# Patient Record
Sex: FEMALE | Race: BLACK OR AFRICAN AMERICAN | NOT HISPANIC OR LATINO | Employment: FULL TIME | URBAN - METROPOLITAN AREA
[De-identification: names, ages, dates, MRNs, and addresses within clinical notes are randomized per-mention and may not be internally consistent; named-entity substitution may affect disease eponyms.]

---
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
## 2020-09-10 ENCOUNTER — HOSPITAL ENCOUNTER (EMERGENCY)
Facility: HOSPITAL | Age: 20
Discharge: OTHER INSTITUTION | End: 2020-09-10
Attending: EMERGENCY MEDICINE | Admitting: EMERGENCY MEDICINE
Payer: COMMERCIAL

## 2020-09-10 ENCOUNTER — HOSPITAL ENCOUNTER (INPATIENT)
Facility: HOSPITAL | Age: 20
LOS: 8 days | Discharge: HOME | DRG: 885 | End: 2020-09-18
Attending: STUDENT IN AN ORGANIZED HEALTH CARE EDUCATION/TRAINING PROGRAM | Admitting: STUDENT IN AN ORGANIZED HEALTH CARE EDUCATION/TRAINING PROGRAM
Payer: COMMERCIAL

## 2020-09-10 VITALS
RESPIRATION RATE: 16 BRPM | HEART RATE: 89 BPM | HEIGHT: 60 IN | OXYGEN SATURATION: 98 % | WEIGHT: 275.57 LBS | SYSTOLIC BLOOD PRESSURE: 110 MMHG | DIASTOLIC BLOOD PRESSURE: 63 MMHG | BODY MASS INDEX: 54.1 KG/M2 | TEMPERATURE: 98.1 F

## 2020-09-10 DIAGNOSIS — D64.9 ANEMIA: ICD-10-CM

## 2020-09-10 DIAGNOSIS — R45.851 SUICIDAL THOUGHTS: Primary | ICD-10-CM

## 2020-09-10 DIAGNOSIS — F32.A DEPRESSION: ICD-10-CM

## 2020-09-10 DIAGNOSIS — K59.00 CONSTIPATION: Primary | ICD-10-CM

## 2020-09-10 DIAGNOSIS — R45.851 SUICIDAL THOUGHTS: ICD-10-CM

## 2020-09-10 LAB
AMPHETAMINES SERPL QL SCN: NEGATIVE
BARBITURATES UR QL: NEGATIVE
BENZODIAZ UR QL: NEGATIVE
COCAINE UR QL: NEGATIVE
ETHANOL EXG-MCNC: 0 MG/DL
EXT PREG TEST URINE: NEGATIVE
EXT. CONTROL ED NAV: NORMAL
METHADONE UR QL: NEGATIVE
OPIATES UR QL SCN: NEGATIVE
OXYCODONE+OXYMORPHONE UR QL SCN: NEGATIVE
PCP UR QL: NEGATIVE
SARS-COV-2 RNA RESP QL NAA+PROBE: NEGATIVE
THC UR QL: NEGATIVE

## 2020-09-10 PROCEDURE — 80307 DRUG TEST PRSMV CHEM ANLYZR: CPT | Performed by: EMERGENCY MEDICINE

## 2020-09-10 PROCEDURE — 99285 EMERGENCY DEPT VISIT HI MDM: CPT

## 2020-09-10 PROCEDURE — 87635 SARS-COV-2 COVID-19 AMP PRB: CPT | Performed by: EMERGENCY MEDICINE

## 2020-09-10 PROCEDURE — 82075 ASSAY OF BREATH ETHANOL: CPT | Performed by: EMERGENCY MEDICINE

## 2020-09-10 PROCEDURE — 81025 URINE PREGNANCY TEST: CPT | Performed by: EMERGENCY MEDICINE

## 2020-09-10 PROCEDURE — G0379 DIRECT REFER HOSPITAL OBSERV: HCPCS

## 2020-09-10 PROCEDURE — 99285 EMERGENCY DEPT VISIT HI MDM: CPT | Performed by: EMERGENCY MEDICINE

## 2020-09-10 RX ORDER — ACETAMINOPHEN 325 MG/1
650 TABLET ORAL EVERY 4 HOURS PRN
Status: CANCELLED | OUTPATIENT
Start: 2020-09-10

## 2020-09-10 RX ORDER — TRAZODONE HYDROCHLORIDE 50 MG/1
50 TABLET ORAL
Status: DISCONTINUED | OUTPATIENT
Start: 2020-09-10 | End: 2020-09-18 | Stop reason: HOSPADM

## 2020-09-10 RX ORDER — MAGNESIUM HYDROXIDE/ALUMINUM HYDROXICE/SIMETHICONE 120; 1200; 1200 MG/30ML; MG/30ML; MG/30ML
30 SUSPENSION ORAL EVERY 4 HOURS PRN
Status: DISCONTINUED | OUTPATIENT
Start: 2020-09-10 | End: 2020-09-18 | Stop reason: HOSPADM

## 2020-09-10 RX ORDER — LORAZEPAM 2 MG/ML
2 INJECTION INTRAMUSCULAR EVERY 6 HOURS PRN
Status: CANCELLED | OUTPATIENT
Start: 2020-09-10

## 2020-09-10 RX ORDER — LORAZEPAM 1 MG/1
1 TABLET ORAL EVERY 6 HOURS PRN
Status: CANCELLED | OUTPATIENT
Start: 2020-09-10

## 2020-09-10 RX ORDER — OLANZAPINE 10 MG/1
10 INJECTION, POWDER, LYOPHILIZED, FOR SOLUTION INTRAMUSCULAR EVERY 8 HOURS PRN
Status: CANCELLED | OUTPATIENT
Start: 2020-09-10

## 2020-09-10 RX ORDER — OLANZAPINE 2.5 MG/1
10 TABLET ORAL EVERY 8 HOURS PRN
Status: CANCELLED | OUTPATIENT
Start: 2020-09-10

## 2020-09-10 RX ORDER — RISPERIDONE 1 MG/1
1 TABLET, ORALLY DISINTEGRATING ORAL
Status: DISCONTINUED | OUTPATIENT
Start: 2020-09-10 | End: 2020-09-18 | Stop reason: HOSPADM

## 2020-09-10 RX ORDER — HALOPERIDOL 5 MG/ML
5 INJECTION INTRAMUSCULAR EVERY 6 HOURS PRN
Status: DISCONTINUED | OUTPATIENT
Start: 2020-09-10 | End: 2020-09-18 | Stop reason: HOSPADM

## 2020-09-10 RX ORDER — RISPERIDONE 1 MG/1
1 TABLET, ORALLY DISINTEGRATING ORAL
Status: CANCELLED | OUTPATIENT
Start: 2020-09-10

## 2020-09-10 RX ORDER — HYDROXYZINE HYDROCHLORIDE 25 MG/1
25 TABLET, FILM COATED ORAL EVERY 6 HOURS PRN
Status: CANCELLED | OUTPATIENT
Start: 2020-09-10

## 2020-09-10 RX ORDER — HALOPERIDOL 5 MG/ML
5 INJECTION INTRAMUSCULAR EVERY 6 HOURS PRN
Status: CANCELLED | OUTPATIENT
Start: 2020-09-10

## 2020-09-10 RX ORDER — ACETAMINOPHEN 325 MG/1
650 TABLET ORAL EVERY 4 HOURS PRN
Status: DISCONTINUED | OUTPATIENT
Start: 2020-09-10 | End: 2020-09-18 | Stop reason: HOSPADM

## 2020-09-10 RX ORDER — HYDROXYZINE HYDROCHLORIDE 25 MG/1
25 TABLET, FILM COATED ORAL EVERY 6 HOURS PRN
Status: DISCONTINUED | OUTPATIENT
Start: 2020-09-10 | End: 2020-09-18 | Stop reason: HOSPADM

## 2020-09-10 RX ORDER — BENZTROPINE MESYLATE 1 MG/1
1 TABLET ORAL EVERY 6 HOURS PRN
Status: DISCONTINUED | OUTPATIENT
Start: 2020-09-10 | End: 2020-09-18 | Stop reason: HOSPADM

## 2020-09-10 RX ORDER — MAGNESIUM HYDROXIDE/ALUMINUM HYDROXICE/SIMETHICONE 120; 1200; 1200 MG/30ML; MG/30ML; MG/30ML
30 SUSPENSION ORAL EVERY 4 HOURS PRN
Status: CANCELLED | OUTPATIENT
Start: 2020-09-10

## 2020-09-10 RX ORDER — TRAZODONE HYDROCHLORIDE 50 MG/1
50 TABLET ORAL
Status: CANCELLED | OUTPATIENT
Start: 2020-09-10

## 2020-09-10 RX ORDER — LORAZEPAM 2 MG/ML
2 INJECTION INTRAMUSCULAR EVERY 6 HOURS PRN
Status: DISCONTINUED | OUTPATIENT
Start: 2020-09-10 | End: 2020-09-18 | Stop reason: HOSPADM

## 2020-09-10 RX ORDER — OLANZAPINE 10 MG/1
10 TABLET ORAL EVERY 8 HOURS PRN
Status: DISCONTINUED | OUTPATIENT
Start: 2020-09-10 | End: 2020-09-18 | Stop reason: HOSPADM

## 2020-09-10 RX ORDER — IBUPROFEN 600 MG/1
600 TABLET ORAL EVERY 6 HOURS PRN
Status: DISCONTINUED | OUTPATIENT
Start: 2020-09-10 | End: 2020-09-18 | Stop reason: HOSPADM

## 2020-09-10 RX ORDER — HALOPERIDOL 5 MG
5 TABLET ORAL EVERY 6 HOURS PRN
Status: DISCONTINUED | OUTPATIENT
Start: 2020-09-10 | End: 2020-09-18 | Stop reason: HOSPADM

## 2020-09-10 RX ORDER — OLANZAPINE 10 MG/1
10 INJECTION, POWDER, LYOPHILIZED, FOR SOLUTION INTRAMUSCULAR EVERY 8 HOURS PRN
Status: DISCONTINUED | OUTPATIENT
Start: 2020-09-10 | End: 2020-09-18 | Stop reason: HOSPADM

## 2020-09-10 RX ORDER — HALOPERIDOL 5 MG
5 TABLET ORAL EVERY 6 HOURS PRN
Status: CANCELLED | OUTPATIENT
Start: 2020-09-10

## 2020-09-10 RX ORDER — IBUPROFEN 600 MG/1
600 TABLET ORAL EVERY 6 HOURS PRN
Status: CANCELLED | OUTPATIENT
Start: 2020-09-10

## 2020-09-10 RX ORDER — ACETAMINOPHEN 325 MG/1
325 TABLET ORAL EVERY 6 HOURS PRN
Status: DISCONTINUED | OUTPATIENT
Start: 2020-09-10 | End: 2020-09-18 | Stop reason: HOSPADM

## 2020-09-10 RX ORDER — BENZTROPINE MESYLATE 1 MG/ML
1 INJECTION INTRAMUSCULAR; INTRAVENOUS EVERY 6 HOURS PRN
Status: CANCELLED | OUTPATIENT
Start: 2020-09-10

## 2020-09-10 RX ORDER — LORAZEPAM 1 MG/1
1 TABLET ORAL EVERY 6 HOURS PRN
Status: DISCONTINUED | OUTPATIENT
Start: 2020-09-10 | End: 2020-09-18 | Stop reason: HOSPADM

## 2020-09-10 RX ORDER — ACETAMINOPHEN 325 MG/1
325 TABLET ORAL EVERY 6 HOURS PRN
Status: CANCELLED | OUTPATIENT
Start: 2020-09-10

## 2020-09-10 RX ORDER — BENZTROPINE MESYLATE 1 MG/1
1 TABLET ORAL EVERY 6 HOURS PRN
Status: CANCELLED | OUTPATIENT
Start: 2020-09-10

## 2020-09-10 RX ORDER — HYDROXYZINE 50 MG/1
50 TABLET, FILM COATED ORAL EVERY 6 HOURS PRN
Status: DISCONTINUED | OUTPATIENT
Start: 2020-09-10 | End: 2020-09-18 | Stop reason: HOSPADM

## 2020-09-10 RX ORDER — HYDROXYZINE HYDROCHLORIDE 25 MG/1
50 TABLET, FILM COATED ORAL EVERY 6 HOURS PRN
Status: CANCELLED | OUTPATIENT
Start: 2020-09-10

## 2020-09-10 RX ORDER — BENZTROPINE MESYLATE 1 MG/ML
1 INJECTION INTRAMUSCULAR; INTRAVENOUS EVERY 6 HOURS PRN
Status: DISCONTINUED | OUTPATIENT
Start: 2020-09-10 | End: 2020-09-18 | Stop reason: HOSPADM

## 2020-09-10 RX ADMIN — HYDROXYZINE HYDROCHLORIDE 50 MG: 50 TABLET, FILM COATED ORAL at 21:59

## 2020-09-10 NOTE — ED NOTES
Insurance Authorization for admission:   Phone call placed to [facility]  Phone number: [phone]  Spoke to [facility] who stated that this is a NEPA plan, and Monie would precert  Transferred to [phone]  Spoke with Gabriele Sparrow who stated that we are to call [phone] to complete precert  Spoke with Piper GERARDO who stated that we are to contact [facility] @ [phone]  Spoke with Chana who referred to [address] [place] [address] @ [phone]  Spoke with Kimberley Rich  who stated that we are to speak with [facility] @ [phone]  Spoke with Mile, who stated that the patients policy termed in 9/26/6145, when using ID# [id number], and there is not another policy in their unit  EVS (Eligibility Verification System) called - [phone]  Automated system indicates: Pt is eligible for MA: mental Health Services are managed by Managed Care  Insurance Authorization for admission:   Phone call placed to [facility]  Phone number: [phone]  Spoke to Hailey Valdez  14 days approved  Level of care: IP  (201)  Review on 9/23  Authorization # [id number]  Insurance Authorization for Transportation:    Not needed for CTS transfer       Ki Penaloza LMSW  09/10/20  1652

## 2020-09-10 NOTE — ED PROVIDER NOTES
Pt Name: Sonia Rivas  MRN: [id number]  [place] 2000  Age/Sex: 23 y o  female  Date of evaluation: 9/10/2020  PCP: Celina Jose MD    [address] [place] [address]    Chief Complaint   Patient presents with    Psychiatric Evaluation     Pt brought in by EMS from home with c/o SI with plan to overdose or shoot herself with father's guns  Says often has SI, this particular episode has been occuring for a week  Was admitted to [facility] last month for similar after shaving her head and cutting her arms  Police on scene this morning called by father juan she lives with  Calm, cooperative, tearful  Seeking inpatient admission         HPI    23 y o  female presenting with anxiety and suicidal ideations  Past medical history of depression and anxiety, has been compliant with her medications  This morning she had racing thoughts, thoughts of harming herself  Texted her therapist, was making suicidal statements  Had a plan to overdose on her medications, states her dad as firearms in the house, she also had a plan to use them on herself  Therapist called crisis, and patient was brought to the emergency department  Denies alcohol, illicit drug use  Denies homicidal ideations  Past Medical and Surgical History    Past Medical History:   Diagnosis Date    Anxiety     Depression     History of psychiatric hospitalization     Panic attack     Psychiatric disorder     Suicidal ideations     Suicide attempt ([facility] [address] ) 2016       History reviewed  No pertinent surgical history  History reviewed  No pertinent family history  Social History     Tobacco Use    Smoking status: Never Smoker    Smokeless tobacco: Never Used   Substance Use Topics    Alcohol use: No    Drug use: No           Allergies    No Known Allergies    Home Medications    Prior to Admission medications    Medication Sig Start Date End Date Taking?  Authorizing Provider   ARIPiprazole (ABILIFY) 10 mg tablet Take 1 tablet by mouth daily At 9AM 12/13/17   Christopher Gokul   sertraline (ZOLOFT) 50 mg tablet Take 1 tablet by mouth daily At [facility] 12/13/17   [address]            Review of Systems    Review of Systems   Constitutional: Negative for chills and fever  HENT: Negative for rhinorrhea and sore throat  Eyes: Negative for pain and visual disturbance  Respiratory: Negative for cough and shortness of breath  Cardiovascular: Negative for chest pain and leg swelling  Gastrointestinal: Negative for abdominal pain, nausea and vomiting  Genitourinary: Negative for dysuria and hematuria  Musculoskeletal: Negative for back pain and myalgias  Skin: Negative for rash and wound  Neurological: Negative for syncope and headaches  Psychiatric/Behavioral: Positive for suicidal ideas  Negative for hallucinations  The patient is nervous/anxious  All other systems reviewed and negative  Physical Exam      ED Triage Vitals [09/10/20 0916]   Temperature Pulse Respirations Blood Pressure SpO2   98 1 °F (36 7 °C) 77 16 106/65 100 %      Temp Source Heart Rate Source Patient Position - Orthostatic VS BP Location FiO2 (%)   Oral Monitor Sitting Right arm --      Pain Score       --               Physical Exam  Constitutional:       General: She is not in acute distress  Appearance: She is not ill-appearing  HENT:      Head: Normocephalic and atraumatic  Nose: Nose normal       Mouth/Throat:      Mouth: Mucous membranes are moist    Eyes:      Extraocular Movements: Extraocular movements intact  Pupils: Pupils are equal, round, and reactive to light  Neck:      Musculoskeletal: Normal range of motion and neck supple  Cardiovascular:      Rate and Rhythm: Normal rate and regular rhythm  Pulmonary:      Effort: No respiratory distress  Breath sounds: Normal breath sounds  No wheezing  Abdominal:      General: Abdomen is flat  There is no distension  Tenderness: There is no abdominal tenderness  Musculoskeletal: Normal range of motion  General: No swelling or deformity  Skin:     General: Skin is warm  Findings: No erythema  Neurological:      Mental Status: She is alert and oriented to person, place, and time  Mental status is at baseline  Psychiatric:      Comments: Anxious, flat affect              Diagnostic Results      Labs:    Results Reviewed     Procedure Component Value Units Date/Time    Rapid drug screen, urine [[id number]] Collected:  09/10/20 0955    Lab Status: In process Specimen:  Urine, Clean Catch Updated:  09/10/20 1040    POCT pregnancy, urine [[id number]]  (Normal) Resulted:  09/10/20 0958    Lab Status:  Final result Updated:  09/10/20 1001     EXT PREG TEST UR (Ref: Negative) negative     Control valid    POCT alcohol breath test [[id number]]  (Normal) Resulted:  09/10/20 0848    Lab Status:  Final result Updated:  09/10/20 0849     EXTBreath Alcohol 0 000    Novel Coronavirus (Covid-19),PCR [facility] [[id number]]     Lab Status:  No result Specimen:  Nares from Nose           All labs reviewed and utilized in the medical decision making process    Radiology:    No orders to display       All radiology studies independently viewed by me and interpreted by the radiologist     Procedure    Procedures        MDM    Vitals reassuring  Suicidal thoughts as discussed above  Had multiple plans to commit suicide this morning  Currently calm and cooperative  Medically cleared for crisis evaluation  ED Course as of Sep 10 1051   Thu Sep 10, 2020   0959 EXTBreath Alcohol: 0 000   1004 PREGNANCY TEST URINE: negative   1045 Patient signed 12 for voluntary inpatient psychiatric treatment             Medications - No data to display        FINAL IMPRESSION    Final diagnoses:   Suicidal thoughts         DISPOSITION    Time reflects when diagnosis was documented in both MDM as applicable and the Disposition within this note     Time User Action Codes Description Comment    9/10/2020 10:46 AM Felix Davila Add [L66 656] Suicidal thoughts       ED Disposition     ED Disposition Condition Date/Time Comment    Transfer to [facility]  Thu Sep 10, 2020 10:50 AM Kevin Wells should be transferred out to crisis and has been medically cleared  MD Documentation      Most Recent Value   Sending MD  Dr Gato Willis:    No follow-up provider specified  DISCHARGE MEDICATIONS:    Patient's Medications   Discharge Prescriptions    No medications on file       No discharge procedures on file  Diana Hunter DO        This note was partially completed using voice recognition technology, and was scanned for gross errors; however some errors may still exist  Please contact the author with any questions or requests for clarification        Diana Hunter DO  09/10/20 9186

## 2020-09-10 NOTE — ED NOTES
Room stripped for behavioral health patient  Environment is safe and appropriate for care at this time        Sonya Joyner  09/10/20 101

## 2020-09-10 NOTE — ED NOTES
Patient is accepted at [facility]  Patient is accepted by Jamie Cardoso PA-C/Dr Sonali Page per [facility] in Intake  Transportation is arranged with CTS per Ed at [facility]  Transportation is scheduled for 630pm        Nurse report is to be called to [phone] prior to patient transfer      Alexis Palacios, DEO  09/10/20  1165

## 2020-09-10 NOTE — ED NOTES
201 sent to Intake for review at [facility]       Aldair Beard, [facility]  09/10/20  1653

## 2020-09-10 NOTE — LETTER
[address]  [address]  [facility] [id number]  Dept: [phone]                                                                EMTALA TRANSFER CONSENT    NAME Viral Baig DOB 2000                              MRN [id number]    I have been informed of my rights regarding examination, treatment, and transfer   by Dr Cheng Hansen DO    Benefits: Other benefits (Include comment)_______________________(Inpatient Psych Tx (201))    Risks: Potential for delay in receiving treatment    Consent for Transfer:  I acknowledge that my medical condition has been evaluated and explained to me by the emergency department physician or other qualified medical person and/or my attending physician, who has recommended that I be transferred to the service of  Accepting Physician: Taz Burns PA-C at [address] [place] [address] Name, [facility]  The above potential benefits of such transfer, the potential risks associated with such transfer, and the probable risks of not being transferred have been explained to me, and I fully understand them  The doctor has explained that, in my case, the benefits of transfer outweigh the risks  I agree to be transferred  I authorize the performance of emergency medical procedures and treatments upon me in both transit and upon arrival at the receiving facility  Additionally, I authorize the release of any and all medical records to the receiving facility and request they be transported with me, if possible  I understand that the safest mode of transportation during a medical emergency is an ambulance and that the Hospital advocates the use of this mode of transport  Risks of traveling to the receiving facility by car, including absence of medical control, life sustaining equipment, such as oxygen, and medical personnel has been explained to me and I fully understand them      ([place] BELOW)  [X]  I consent to the stated transfer and to be transported by ambulance/helicopter  [  ]  I consent to the stated transfer, but refuse transportation by ambulance and accept full responsibility for my transportation by car  I understand the risks of non-ambulance transfers and I exonerate the Hospital and its staff from any deterioration in my condition that results from this refusal     X___________________________________________    DATE  09/10/20  TIME________  Signature of patient or legally responsible individual signing on patient behalf           RELATIONSHIP TO PATIENT_________________________                            Provider Certification    NAME Bijan Schulte                                         2000                              MRN [id number]    A medical screening exam was performed on the above named patient  Based on the examination:    Condition Necessitating Transfer The encounter diagnosis was Suicidal thoughts  Patient Condition: The patient has been stabilized such that within reasonable medical probability, no material deterioration of the patient condition or the condition of the unborn child(neel) is likely to result from the transfer    Reason for Transfer: Level of Care needed not available at this facility    Transfer Requirements: [address] [place]  · Space available and qualified personnel available for treatment as acknowledged by STEPHANIE Merino  · Agreed to accept transfer and to provide appropriate medical treatment as acknowledged by       Jhoan Camarena PA-C  · Appropriate medical records of the examination and treatment of the patient are provided at the time of transfer   [facility], [address] _______  · Transfer will be performed by qualified personnel from [address] [place] [address]  and appropriate transfer equipment as required, including the use of necessary and appropriate life support measures      Provider Certification: I have examined the patient and explained the following risks and benefits of being transferred/refusing transfer to the patient/family:  The patient is stable for psychiatric transfer because they are medically stable, and is protected from harming him/herself or others during transport      Based on these reasonable risks and benefits to the patient and/or the unborn child(neel), and based upon the information available at the time of the patients examination, I certify that the medical benefits reasonably to be expected from the provision of appropriate medical treatments at another medical facility outweigh the increasing risks, if any, to the individuals medical condition, and in the case of labor to the unborn child, from effecting the transfer      X____________________________________________ DATE 09/10/20        TIME_______      ORIGINAL - SEND TO MEDICAL RECORDS   COPY - SEND WITH PATIENT DURING TRANSFER

## 2020-09-10 NOTE — ED NOTES
Patient identifies as transgender and prefers to be called Larry Gordillo, please utilize he/him pronouns        Jose Maria Briones  09/10/20 6976

## 2020-09-10 NOTE — ED NOTES
Patient belongings:     One blue jeep backpack contains:  -side pockets: soap, roll on deodorant, counseling pamphlet   -front pocket: rice krispy treat, deodorant, brush  -middle pocket: two pens one pencil  -main pocket: keys on lanyard, wallet, hat, red vest, stockings, burgundy vest, 3 underwear, leggings, bra, 4 tshirt, sweater vest, sweatpants, shorts, socks, 2 slacks  -wallet contains:3 $1 bills, yellow access card, photo ID, social security card, assorted business cards    Plastic patient belonging bag contains  -flannel top, undershirt, jeans, sneakers, iphone, phone , chest binder    Patient belongings on person include  -glasses, mask     Bernie White  09/10/20 0388

## 2020-09-10 NOTE — ED NOTES
Telephone numbers for patient use     [facility] ([facility]) [phone]  Alyssa Ramirez [facility] [phone]     Khris Reyes  09/10/20 2007

## 2020-09-10 NOTE — ED NOTES
Patient states we can notify her father of change of status (regarding transfers, discharge) but please keep details to a minimum  Patient prefers he not be at bedside at this time        Alexa Simmons  09/10/20 8952
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
## 2020-09-11 PROBLEM — F41.1 GENERALIZED ANXIETY DISORDER WITH PANIC ATTACKS: Status: ACTIVE | Noted: 2020-09-11

## 2020-09-11 PROBLEM — R45.851 SUICIDAL IDEATION: Status: ACTIVE | Noted: 2020-09-11

## 2020-09-11 PROBLEM — D64.9 ANEMIA: Status: ACTIVE | Noted: 2020-09-11

## 2020-09-11 PROBLEM — F41.0 GENERALIZED ANXIETY DISORDER WITH PANIC ATTACKS: Status: ACTIVE | Noted: 2020-09-11

## 2020-09-11 PROBLEM — F32.A DEPRESSION: Status: ACTIVE | Noted: 2017-12-08

## 2020-09-11 PROBLEM — Z00.8 MEDICAL CLEARANCE FOR PSYCHIATRIC ADMISSION: Status: ACTIVE | Noted: 2020-09-11

## 2020-09-11 PROBLEM — F33.9 RECURRENT MAJOR DEPRESSIVE DISORDER (HCC): Status: ACTIVE | Noted: 2020-09-11

## 2020-09-11 PROBLEM — F43.10 PTSD (POST-TRAUMATIC STRESS DISORDER): Status: ACTIVE | Noted: 2020-09-11

## 2020-09-11 LAB
ALBUMIN SERPL BCP-MCNC: 3 G/DL (ref 3.5–5)
ALP SERPL-CCNC: 42 U/L (ref 46–116)
ALT SERPL W P-5'-P-CCNC: 12 U/L (ref 12–78)
ANION GAP SERPL CALCULATED.3IONS-SCNC: 3 MMOL/L (ref 4–13)
AST SERPL W P-5'-P-CCNC: 11 U/L (ref 5–45)
ATRIAL RATE: 87 BPM
BACTERIA UR QL AUTO: ABNORMAL /HPF
BASOPHILS # BLD AUTO: 0.03 THOUSANDS/ΜL (ref 0–0.1)
BASOPHILS # BLD AUTO: 0.03 THOUSANDS/ΜL (ref 0–0.1)
BASOPHILS NFR BLD AUTO: 0 % (ref 0–1)
BASOPHILS NFR BLD AUTO: 1 % (ref 0–1)
BILIRUB SERPL-MCNC: 0.2 MG/DL (ref 0.2–1)
BILIRUB UR QL STRIP: NEGATIVE
BUN SERPL-MCNC: 12 MG/DL (ref 5–25)
CALCIUM SERPL-MCNC: 8.5 MG/DL (ref 8.3–10.1)
CHLORIDE SERPL-SCNC: 106 MMOL/L (ref 100–108)
CHOLEST SERPL-MCNC: 142 MG/DL (ref 50–200)
CLARITY UR: ABNORMAL
CO2 SERPL-SCNC: 29 MMOL/L (ref 21–32)
COLOR UR: YELLOW
CREAT SERPL-MCNC: 0.67 MG/DL (ref 0.6–1.3)
EOSINOPHIL # BLD AUTO: 0.14 THOUSAND/ΜL (ref 0–0.61)
EOSINOPHIL # BLD AUTO: 0.15 THOUSAND/ΜL (ref 0–0.61)
EOSINOPHIL NFR BLD AUTO: 2 % (ref 0–6)
EOSINOPHIL NFR BLD AUTO: 2 % (ref 0–6)
ERYTHROCYTE [DISTWIDTH] IN BLOOD BY AUTOMATED COUNT: 12.3 % (ref 11.6–15.1)
ERYTHROCYTE [DISTWIDTH] IN BLOOD BY AUTOMATED COUNT: 12.5 % (ref 11.6–15.1)
FERRITIN SERPL-MCNC: 57 NG/ML (ref 8–388)
GLUCOSE P FAST SERPL-MCNC: 83 MG/DL (ref 65–99)
GLUCOSE SERPL-MCNC: 83 MG/DL (ref 65–140)
GLUCOSE UR STRIP-MCNC: NEGATIVE MG/DL
HCG SERPL QL: NEGATIVE
HCT VFR BLD AUTO: 32.6 % (ref 36.5–46.1)
HCT VFR BLD AUTO: 33.8 % (ref 36.5–46.1)
HDLC SERPL-MCNC: 51 MG/DL
HGB BLD-MCNC: 10.2 G/DL (ref 12–15.4)
HGB BLD-MCNC: 10.4 G/DL (ref 12–15.4)
HGB UR QL STRIP.AUTO: NEGATIVE
IMM GRANULOCYTES # BLD AUTO: 0.01 THOUSAND/UL (ref 0–0.2)
IMM GRANULOCYTES # BLD AUTO: 0.02 THOUSAND/UL (ref 0–0.2)
IMM GRANULOCYTES NFR BLD AUTO: 0 % (ref 0–2)
IMM GRANULOCYTES NFR BLD AUTO: 0 % (ref 0–2)
IRON SATN MFR SERPL: 19 %
IRON SERPL-MCNC: 49 UG/DL (ref 65–170)
KETONES UR STRIP-MCNC: NEGATIVE MG/DL
LDLC SERPL CALC-MCNC: 84 MG/DL (ref 0–100)
LEUKOCYTE ESTERASE UR QL STRIP: ABNORMAL
LYMPHOCYTES # BLD AUTO: 2.23 THOUSANDS/ΜL (ref 0.6–4.47)
LYMPHOCYTES # BLD AUTO: 2.74 THOUSANDS/ΜL (ref 0.6–4.47)
LYMPHOCYTES NFR BLD AUTO: 35 % (ref 14–44)
LYMPHOCYTES NFR BLD AUTO: 37 % (ref 14–44)
MCH RBC QN AUTO: 27.3 PG (ref 26.8–34.3)
MCH RBC QN AUTO: 27.4 PG (ref 26.8–34.3)
MCHC RBC AUTO-ENTMCNC: 30.8 G/DL (ref 31.4–37.4)
MCHC RBC AUTO-ENTMCNC: 31.3 G/DL (ref 31.4–37.4)
MCV RBC AUTO: 88 FL (ref 82–98)
MCV RBC AUTO: 89 FL (ref 82–98)
MONOCYTES # BLD AUTO: 0.51 THOUSAND/ΜL (ref 0.17–1.22)
MONOCYTES # BLD AUTO: 0.58 THOUSAND/ΜL (ref 0.17–1.22)
MONOCYTES NFR BLD AUTO: 8 % (ref 4–12)
MONOCYTES NFR BLD AUTO: 8 % (ref 4–12)
NEUTROPHILS # BLD AUTO: 3.5 THOUSANDS/ΜL (ref 1.85–7.62)
NEUTROPHILS # BLD AUTO: 3.83 THOUSANDS/ΜL (ref 1.85–7.62)
NEUTS SEG NFR BLD AUTO: 53 % (ref 43–75)
NEUTS SEG NFR BLD AUTO: 54 % (ref 43–75)
NITRITE UR QL STRIP: NEGATIVE
NON-SQ EPI CELLS URNS QL MICRO: ABNORMAL /HPF
NONHDLC SERPL-MCNC: 91 MG/DL
NRBC BLD AUTO-RTO: 0 /100 WBCS
NRBC BLD AUTO-RTO: 0 /100 WBCS
P AXIS: 73 DEGREES
PH UR STRIP.AUTO: 6 [PH]
PLATELET # BLD AUTO: 184 THOUSANDS/UL (ref 149–390)
PLATELET # BLD AUTO: 200 THOUSANDS/UL (ref 149–390)
PMV BLD AUTO: 11.9 FL (ref 8.9–12.7)
PMV BLD AUTO: 12.4 FL (ref 8.9–12.7)
POTASSIUM SERPL-SCNC: 3.8 MMOL/L (ref 3.5–5.3)
PR INTERVAL: 128 MS
PROT SERPL-MCNC: 6.8 G/DL (ref 6.4–8.2)
PROT UR STRIP-MCNC: ABNORMAL MG/DL
QRS AXIS: 60 DEGREES
QRSD INTERVAL: 86 MS
QT INTERVAL: 366 MS
QTC INTERVAL: 440 MS
RBC # BLD AUTO: 3.72 MILLION/UL (ref 3.88–5.12)
RBC # BLD AUTO: 3.81 MILLION/UL (ref 3.88–5.12)
RBC #/AREA URNS AUTO: ABNORMAL /HPF
SODIUM SERPL-SCNC: 138 MMOL/L (ref 136–145)
SP GR UR STRIP.AUTO: >=1.03 (ref 1–1.03)
T WAVE AXIS: 24 DEGREES
TIBC SERPL-MCNC: 253 UG/DL (ref 250–450)
TRIGL SERPL-MCNC: 35 MG/DL
TSH SERPL DL<=0.05 MIU/L-ACNC: 2.3 UIU/ML (ref 0.36–3.74)
UROBILINOGEN UR QL STRIP.AUTO: 0.2 E.U./DL
VENTRICULAR RATE: 87 BPM
WBC # BLD AUTO: 6.43 THOUSAND/UL (ref 4.31–10.16)
WBC # BLD AUTO: 7.34 THOUSAND/UL (ref 4.31–10.16)
WBC #/AREA URNS AUTO: ABNORMAL /HPF

## 2020-09-11 PROCEDURE — 99251 PR INITL INPATIENT CONSULT NEW/ESTAB PT 20 MIN: CPT | Performed by: NURSE PRACTITIONER

## 2020-09-11 PROCEDURE — 86592 SYPHILIS TEST NON-TREP QUAL: CPT | Performed by: PHYSICIAN ASSISTANT

## 2020-09-11 PROCEDURE — 80053 COMPREHEN METABOLIC PANEL: CPT | Performed by: NURSE PRACTITIONER

## 2020-09-11 PROCEDURE — 93010 ELECTROCARDIOGRAM REPORT: CPT | Performed by: INTERNAL MEDICINE

## 2020-09-11 PROCEDURE — 83550 IRON BINDING TEST: CPT | Performed by: NURSE PRACTITIONER

## 2020-09-11 PROCEDURE — 93005 ELECTROCARDIOGRAM TRACING: CPT

## 2020-09-11 PROCEDURE — 80061 LIPID PANEL: CPT | Performed by: NURSE PRACTITIONER

## 2020-09-11 PROCEDURE — 99222 1ST HOSP IP/OBS MODERATE 55: CPT | Performed by: STUDENT IN AN ORGANIZED HEALTH CARE EDUCATION/TRAINING PROGRAM

## 2020-09-11 PROCEDURE — 83540 ASSAY OF IRON: CPT | Performed by: NURSE PRACTITIONER

## 2020-09-11 PROCEDURE — 82728 ASSAY OF FERRITIN: CPT | Performed by: NURSE PRACTITIONER

## 2020-09-11 PROCEDURE — 85025 COMPLETE CBC W/AUTO DIFF WBC: CPT | Performed by: PHYSICIAN ASSISTANT

## 2020-09-11 PROCEDURE — 84443 ASSAY THYROID STIM HORMONE: CPT | Performed by: PHYSICIAN ASSISTANT

## 2020-09-11 PROCEDURE — 81001 URINALYSIS AUTO W/SCOPE: CPT | Performed by: PHYSICIAN ASSISTANT

## 2020-09-11 PROCEDURE — 85025 COMPLETE CBC W/AUTO DIFF WBC: CPT | Performed by: NURSE PRACTITIONER

## 2020-09-11 PROCEDURE — 84703 CHORIONIC GONADOTROPIN ASSAY: CPT | Performed by: NURSE PRACTITIONER

## 2020-09-11 PROCEDURE — G0379 DIRECT REFER HOSPITAL OBSERV: HCPCS

## 2020-09-11 RX ORDER — SERTRALINE HYDROCHLORIDE 100 MG/1
100 TABLET, FILM COATED ORAL DAILY
Status: DISCONTINUED | OUTPATIENT
Start: 2020-09-11 | End: 2020-09-18 | Stop reason: HOSPADM

## 2020-09-11 RX ORDER — ARIPIPRAZOLE 5 MG/1
5 TABLET ORAL DAILY
Status: DISCONTINUED | OUTPATIENT
Start: 2020-09-11 | End: 2020-09-18 | Stop reason: HOSPADM

## 2020-09-11 RX ADMIN — ARIPIPRAZOLE 5 MG: 5 TABLET ORAL at 10:56

## 2020-09-11 RX ADMIN — HYDROXYZINE HYDROCHLORIDE 50 MG: 50 TABLET, FILM COATED ORAL at 23:16

## 2020-09-11 RX ADMIN — SERTRALINE HYDROCHLORIDE 100 MG: 100 TABLET ORAL at 10:56

## 2020-09-11 NOTE — ASSESSMENT & PLAN NOTE
· Patient threatened to shoot herself with her father's gun or overdose on multiple pills  · history of suicidal attempt in past by hanging
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
## 2020-09-11 NOTE — CASE MANAGEMENT
CM worked to complete [facility] referral packet & provided a printout to Pt regarding the program     CM contacted [facility] @ [phone] opt [phone] & left a message to notify of the admission & request a return call to schedule follow-up appointments  CM contacted Pt's PCP office, Dr Purnima Molina, @ [phone] & spoke with Ramiro Rebolledo to notify her of Pt's admission  CM asked if Pt was able to get at PAP done in office & she reported that  Pt was scheduled for a PAP at their [facility] on 8/11, but missed the appointment  CM asked about rescheduling the appointment but was told to call [phone]  CM called & was transferred to the [facility] department & spoke with [facility]  First available appointment is 11/5 at 9:30 AM with TEJ Cano  They asked Pt bring photo ID, insurance card, & any co-pay she may have  CM contacted Pt's therapist, Uriel Morfin, @ [phone] & left a message to notify her of Pt's admission & request a return call

## 2020-09-11 NOTE — CMS CERTIFICATION NOTE
Recertification: Based upon physical, mental and social evaluations, I certify that inpatient psychiatric services continue to be medically necessary for this patient for a duration of 7 midnights for the treatment of  Recurrent major depressive disorder ([facility] [address] )   Available alternative community resources still do not meet the patient's mental health care needs  I further attest that an established written individualized plan of care has been updated and is outlined in the patient's medical records

## 2020-09-11 NOTE — PROGRESS NOTES
Treatment Plan Meeting:  Diagnosis of recurrent major depressive disorder, anemia, generalized anxiety disorder, & post-traumatic stress disorder reviewed  Discussed short term goals for decrease in depression, anxiety, & decrease in suicidal thoughts  Pt was seen by medical today & they ordered additional labs/testing due to concern with anemia  Pt had questions about getting tested for ADHD, reporting that words are "hard" & is unsure if this is due to Asperger's or Mood Disorder  Reviewed that Pt's medications will be titrated & she will be observed for effect  Pt goes to [facility] for medication management & has a therapist through [employer]  She agreed with a referral for [facility](housing/PHP) & for [facility] services; SARI obtained  At this time no discharge date is set  All parties in agreement & treatment plan was signed       09/11/20 1778   Team Meeting   Meeting Type Tx Team Meeting   Initial Conference Date 09/11/20   Next Conference Date 10/08/20   Team Members Present   Team Members Present Physician;Nurse;   Physician Team Member Dr Christi Crespo Team Member ANETA [facility] Management Team Member Gilberto   Patient/Family Present   Patient Present Yes   Patient's Family Present No

## 2020-09-11 NOTE — PROGRESS NOTES
Polite and cooperative during interaction  Attending groups  Comfortable on unit  Denies SI or thoughts of self harm  Elevated depression and anxiety  Reports racing thoughts which at times cause difficulties falling asleep  Compliant with medications, EKG and labwork

## 2020-09-11 NOTE — PROGRESS NOTES
Patient belongings upon arrival;  -(5) Pants  -(7) Shirts  -(3) Underwear  -(2) Lenward Harjit  -(2) Vests  -Hat  -Shoes  -Shorts  -Panty hose  -Sweater  -Cellphone  -  -(2) Deodorants  -Brush  -Ink pens  -Pencil  -Red wooden hearten  -Black Wallet  -Keys  -$3 33  -Pa ID (1918)  -[facility] #([id number])  -High School Id (012)  -Pa Access (0259)  -[facility] Caritas ([id number])  -[facility] Visa (9853)  -$21 [employer] Gift Card  -Prayer card    Pt belongings in washer and at bedside;  -(2) Pants  -(3) Shirts  -(3) Underwear  -(2) Bras  -Deodorant  -Brush

## 2020-09-11 NOTE — TREATMENT PLAN
RN will meet with pt to assess for reason of admission and to educate on medications and diagnosis  Pt will be encouraged to utilize healthy coping skills and attend groups
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
## 2020-09-11 NOTE — TREATMENT PLAN
TREATMENT PLAN REVIEW - [address] 20 y o  2000 adult MRN: [id number]    [address] [place] [address] Room / Bed: Fay Osler 260/[facility] 216-68 Encounter: [id number]          Admit Date/Time:  9/10/2020  7:41 PM    Treatment Team: Attending Provider: Malena Hunter MD; Charge Nurse: Conchita Lovell, RN; Registered Nurse: Alex Calderón, RN; Patient Care Technician: Emelia Simpson; Patient Care Assistant: Maria Elena Chapa; Patient Care Technician: Michael López; Patient Care Technician: Gladys Rodríguez;  Registered Nurse: Jennifer Winn RN; : Florecita Kelly; Registered Nurse: Skylar Hood RN; Nurse Practitioner: CELIA Issa; Care Manager: Dank Hernadez RN; Occupational Therapy Assistant: BAILEE Stevens    Diagnosis: Principal Problem:    Recurrent major depressive disorder [facility])  Active Problems:    Suicidal ideation    Medical clearance for psychiatric admission    Anemia    Generalized anxiety disorder with panic attacks    PTSD (post-traumatic stress disorder)      Patient Strengths/Assets: average or above intelligence, capable of independent living, cooperative, communication skills, compliant with medication, good insight, good past treatment response, good physical health, patient is on a voluntary commitment    Patient Barriers/Limitations: lack of social/family support, limited education, limited support system    Short Term Goals: decrease in depressive symptoms, decrease in anxiety symptoms, decrease in suicidal thoughts    Long Term Goals: improvement in depression, improvement in anxiety, free of suicidal thoughts    Progress Towards Goals: starting psychiatric medications as prescribed    Recommended Treatment: medication management, patient medication education, group therapy, milieu therapy, continued Behavioral Health psychiatric evaluation/assessment process    Treatment Frequency: daily medication monitoring, group and milieu therapy daily, monitoring through interdisciplinary rounds, monitoring through weekly patient care conferences    Expected Discharge Date:  5-7days    Discharge Plan: referral for outpatient medication management with a psychiatrist, referral for outpatient psychotherapy    Treatment Plan Created/Updated By: Samina Rivera MD

## 2020-09-11 NOTE — PLAN OF CARE
New Admission  Problem: Anxiety  Goal: Anxiety is at manageable level  Description: Interventions:  - Assess and monitor patient's anxiety level  - Monitor for signs and symptoms (heart palpitations, chest pain, shortness of breath, headaches, nausea, feeling jumpy, restlessness, irritable, apprehensive)  - Collaborate with interdisciplinary team and initiate plan and interventions as ordered    - [place] patient to unit/surroundings  - Explain treatment plan  - Encourage participation in care  - Encourage verbalization of concerns/fears  - Identify coping mechanisms  - Assist in developing anxiety-reducing skills  - Administer/offer alternative therapies  - Limit or eliminate stimulants  Outcome: Progressing     Problem: SELF HARM/SUICIDALITY  Goal: Will have no self-injury during hospital stay  Description: INTERVENTIONS:  - Q 15 MINUTES: Routine safety checks  - Q WAKING SHIFT & PRN: Assess risk to determine if routine checks are adequate to maintain patient safety  - Encourage patient to participate actively in care by formulating a plan to combat response to suicidal ideation, identify supports and resources  Outcome: Progressing     Problem: DEPRESSION  Goal: Will be euthymic at discharge  Description: INTERVENTIONS:  - Administer medication as ordered  - Provide emotional support via 1:1 interaction with staff  - Encourage involvement in milieu/groups/activities  - Monitor for social isolation  Outcome: Progressing

## 2020-09-11 NOTE — PROGRESS NOTES
Status: Pt is a 11 from [place] who reported SI after an argument with her father  She reported increased depression & had reportedly been at [facility] about a month ago & shaved her head there during an outburst   Pt was at [facility] in 2017  She received PRN medication & slept    Readmit score 18(yellow)  Medication: to be reviewed / PRN - Atarax  D/C: TBD / new admission     09/11/20 8704   Team Meeting   Meeting Type Daily Rounds   Team Members Present   Team Members Present Physician;Nurse;;Occupational Therapist   Physician Team Member Dr Priyanka Herrera / Carlitos Barry Team Member ANETA [facility] Management Team Member [id number] / Deuce Osorio   OT Team Member Phani   Patient/Family Present   Patient Present No   Patient's Family Present No

## 2020-09-11 NOTE — CONSULTS
[facility]   Consult- Sonia Rivas 2000, 23 y o  adult MRN: [id number]    Unit/Bed#: [facility] 260-02 Encounter: [id number]    Primary Care Provider: Celina Jose MD   Date and time admitted to hospital: 9/10/2020  7:41 PM      Consults    * Depression  Assessment & Plan  · Patient is a transgender female to male who presents to the ED female who presented to the ED due to increased depression, anxiety, and suicidal ideation with a plan to either shoot herself with her father's gun or overdose on pills  Patient reports that these thoughts have increased since day prior, after her dad got in an argument  · Patient prefers to be called Rogerio Sheth  · Patient has a history of multiple psychiatric admissions at [facility] and [facility]  Follows up outpatient with crisis residential services and has a counselor in [place] and psychiatrist at [facility]  · Psychiatry following     Suicidal ideation  Assessment & Plan  · Patient threatened to shoot herself with her father's gun or overdose on multiple pills  · history of suicidal attempt in past by hanging  Anemia  Assessment & Plan  · Review reveals hgb 11 2 back in 2017  Currently 10 2   · Added Iron Panel on to blood work today     Medical clearance for psychiatric admission  Assessment & Plan  · EKG NSR with qtc 440   · Labs ordered  · Cleared for inpatient behavioral health admission         VTE Prophylaxis: Reason for no pharmacologic prophylaxis ambulatory   / reason for no mechanical VTE prophylaxis ambulatory low risk      Recommendations for Discharge:  · Pending clearance by primary team     Counseling / Coordination of Care Time: 45 minutes  Greater than 50% of total time spent on patient counseling and coordination of care  Collaboration of Care:  Were Recommendations Directly Discussed with Primary Treatment Team? - No     History of Present Illness:    Sonia Rivas is a 23 y o  adult who is originally admitted to the psychiatric service due to depression and suicidal ideations  We are consulted for medical management  Review of Systems:    Review of Systems   Constitutional: Negative  HENT: Negative  Eyes: Negative  Respiratory: Negative  Cardiovascular: Negative  Gastrointestinal: Negative  Endocrine: Negative  Genitourinary: Negative  Musculoskeletal: Negative  Skin: Negative  Allergic/Immunologic: Negative  Neurological: Negative  Hematological: Negative  Psychiatric/Behavioral: Positive for dysphoric mood and suicidal ideas  The patient is nervous/anxious  Past Medical and Surgical History:     Past Medical History:   Diagnosis Date    Anxiety     Depression     History of psychiatric hospitalization     Panic attack     Psychiatric disorder     Suicidal ideations     Suicide attempt ([facility] [address] ) 2016       No past surgical history on file  Meds/Allergies:    PTA meds:   Prior to Admission Medications   Prescriptions Last Dose Informant Patient Reported? Taking? ARIPiprazole (ABILIFY) 10 mg tablet  Self No No   Sig: Take 1 tablet by mouth daily At 9AM   Patient taking differently: Take 10 mg by mouth daily Rx bottle states 5mg po Daily   sertraline (ZOLOFT) 50 mg tablet   No No   Sig: Take 1 tablet by mouth daily At 9AM      Facility-Administered Medications: None       Allergies: No Known Allergies    Social History:     Marital Status: Single    Substance Use History:   Social History     Substance and Sexual Activity   Alcohol Use No    Frequency: Never    Binge frequency: Never     Social History     Tobacco Use   Smoking Status Never Smoker   Smokeless Tobacco Never Used     Social History     Substance and Sexual Activity   Drug Use No       Family History:    History reviewed  No pertinent family history      Physical Exam:     Vitals:   Blood Pressure: 125/64 (09/11/20 1044)  Pulse: 99 (09/11/20 1044)  Temperature: 97 9 °F (36 6 °C) (09/11/20 1044)  Temp Source: Tympanic (09/11/20 1044)  Respirations: 16 (09/11/20 1044)  Height: 5' 5" (165 1 cm) (09/11/20 1012)  Weight - Scale: 94 4 kg (208 lb 3 2 oz) (09/11/20 1012)  SpO2: 98 % (09/10/20 2014)    Physical Exam  Constitutional:       Appearance: He is obese  HENT:      Head: Normocephalic and atraumatic  Eyes:      Extraocular Movements: Extraocular movements intact  Pupils: Pupils are equal, round, and reactive to light  Neck:      Musculoskeletal: Normal range of motion  Cardiovascular:      Rate and Rhythm: Normal rate and regular rhythm  Heart sounds: No murmur  Pulmonary:      Effort: Pulmonary effort is normal  No respiratory distress  Breath sounds: Normal breath sounds  Abdominal:      General: Bowel sounds are normal       Palpations: Abdomen is soft  Musculoskeletal: Normal range of motion  Right lower leg: No edema  Left lower leg: No edema  Skin:     General: Skin is warm and dry  Neurological:      General: No focal deficit present  Mental Status: He is oriented to person, place, and time  Psychiatric:         Mood and Affect: Mood normal          Behavior: Behavior normal          Thought Content: Thought content normal          Judgment: Judgment normal              Additional Data:     Lab Results: I have personally reviewed pertinent reports        Results from last 7 days   Lab Units 09/11/20  0811   WBC Thousand/uL 6 43   HEMOGLOBIN g/dL 10 2*   HEMATOCRIT % 32 6*   PLATELETS Thousands/uL 184   NEUTROS PCT % 54   LYMPHS PCT % 35   MONOS PCT % 8   EOS PCT % 2     Results from last 7 days   Lab Units 09/11/20  0811   SODIUM mmol/L 138   POTASSIUM mmol/L 3 8   CHLORIDE mmol/L 106   CO2 mmol/L 29   BUN mg/dL 12   CREATININE mg/dL 0 67   ANION GAP mmol/L 3*   CALCIUM mg/dL 8 5   ALBUMIN g/dL 3 0*   TOTAL BILIRUBIN mg/dL 0 20   ALK PHOS U/L 42*   ALT U/L 12   AST U/L 11   GLUCOSE RANDOM mg/dL 83             No results found for: HGBA1C            Imaging: I have personally reviewed pertinent reports  No orders to display       EKG, Pathology, and Other Studies Reviewed on Admission:   · EKG: pending    ** Please Note: This note has been constructed using a voice recognition system   **

## 2020-09-11 NOTE — DISCHARGE INSTR - APPOINTMENTS
Tramaine Melendez RN, our [employer], will be calling you after your discharge, on the phone number that you provided  She will be available as an additional support, if needed  If you wish to speak with her, you may contact Curt Baird at [phone]

## 2020-09-11 NOTE — DISCHARGE INSTR - OTHER ORDERS
Crisis Intervention   [facility] [phone] provides 24 hour, 7 day a week crisis phone service for any individual in mental health crisis in [facility]  The telephone service is a hotline, manned by a trained professional  Individuals can receive support, information and referral services 24 hours a day 7 days a week  Text CONNECT to 860358 from anywhere in the [place], anytime, about any type of crisis  A live, trained Crisis Counselor receives the text and lets you know that they are here to listen  The volunteer Crisis Counselor will help you move from a hot moment to a cool moment  The [address] [employer] is a toll free telephone line that can be accessed by consumers who are looking for someone to talk to for support or guidance  It is a support service that is designed to promote recovery while focusing on strengths and providing guidance  This line is run by a provider, but is staffed with consumers and/or certified peer specialists who receive a great deal of training and supervision  MH/DS funds this program  The phone number is [phone]  The hours of operation are from 6 PM to 10 PM daily  NAME SPECIALITY INSURANCES/PAYMENTS ADDRESS/PHONE   Depression/Bipolar Support Group 1st & 3rd Wednesdays of each Month  7:00 pm  9:00 pm [facility] [address], [place]  [address]     What you need to know aboutcoronavirus disease 2019 (COVID-19)     What is coronavirus disease 2019 (COVID-19)? Coronavirus disease 2019 (COVID-19) is a respiratory illness that can spread from person to person  The virus that causes COVID-19 is a novel coronavirus that was first identified during an investigation into an outbreak in [place], [place]  Can people in the [place]  get COVID-19? Yes  COVID-19 is spreading from person to person in parts of the [place]   Risk of infection with COVID-19 is higher for people who are close contacts of someone known to have COVID-19, for example healthcare workers, or household members  Other people at higher risk for infection are those who live in or have recently been in an area with ongoing spread of COVID-19  Learn more about places with ongoing spread at   [facility]  html#geographic  Have there been cases of COVID-19 in the [place] ?   Yes  The first case of COVID-19 in the [place] was reported on January 21, 2020  The current count of cases of COVID-19 in the [place] is available on [employer] at [facility]  How does COVID-19 spread? The virus that causes COVID-19 probably emerged from an animal source, but is now spreading from person to person  The virus is thought to spread mainly between people who are in close contact with one another (within about 6 feet) through respiratory droplets produced when an infected person coughs or sneezes  It also may be possible that a person can get COVID-19 by touching a surface or object that has the virus on it and then touching their own mouth, nose, or possibly their eyes, but this is not thought to be the main way the virus spreads  Learn what is known about the spread of newly emerged coronaviruses at [facility]  What are the symptoms of COVID-19? Patients with COVID-19 have had mild to severe respiratory illness with symptoms of   fever   cough   shortness of breath  What are severe complications from this virus? Some patients have pneumonia in both lungs, multi-organ failure and in some cases death  How can I help protect myself? People can help protect themselves from respiratory illness with everyday preventive actions  Avoid close contact with people who are sick  Avoid touching your eyes, nose, and mouth withunwashed hands      Wash your hands often with soap and water for at least 20 seconds  Use an alcohol-based hand  that contains at least 60% alcohol if soap and water are not available  If you are sick, to keep from spreading respiratory illness to others, you should   Stay home when you are sick  Cover your cough or sneeze with a tissue, then throw the tissue in the trash  Clean and disinfect frequently touched objectsand surfaces  What should I do if I recently traveled from an area with ongoing spread of COVID-19? If you have traveled from an affected area, there may be restrictions on your movements for up to 2 weeks  If you develop symptoms during that period (fever, cough, trouble breathing), seek medical advice  Call the office of your health care provider before you go, and tell them about your travel and your symptoms  They will give you instructions on how to get care without exposing other people to your illness  While sick, avoid contact with people, don't go out and delay any travel to reduce the possibility of spreading illness to others  Is there a vaccine? There is currently no vaccine to protect against COVID-19  The best way to prevent infection is to take everyday preventive actions, like avoiding close contact with people who are sick and washing your hands often  Is there a treatment? There is no specific antiviral treatment for COVID-19  People with COVID-19 can seek medical care to helprelieve symptoms  For more information: www cdc gov/GBUIN63ZT 262197-Z 03/03/2020       What to do if you are sick withcoronavirus disease 2019 (COVID-19)     If you are sick with COVID-19 or suspect you are infected with the virus that causes COVID-19, follow the steps below to help prevent the disease from spreading to people in your home and community  Stay home except to get medical care   You should restrict activities outside your home, except for getting medical care  Do not go to work, school, or public areas   Avoid using public transportation, ride-sharing, or taxis  Separate yourself from other people and animals inyour home  People: As much as possible, you should stay in a specific room and away from other people in your home  Also, you should use a separate bathroom, if available  Animals: Do not handle pets or other animals while sick  See COVID-19 and Animals for more information  Call ahead before visiting your doctor   If you have a medical appointment, call the healthcare provider and tell them that you have or may have COVID-19  This will help the healthcare provider's office take steps to keep other people from getting infected or exposed  Wear a facemask  You should wear a facemask when you are around other people (e g , sharing a room or vehicle) or pets and before you enter a healthcare provider's office  If you are not able to wear a facemask (for example, because it causes trouble breathing), then people who live with you should not stay in the same room with you, or they should wear a facemask if they enteryour room  Cover your coughs and sneezes   Cover your mouth and nose with a tissue when you cough or sneeze  Throw used tissues in a lined trash can; immediately wash your hands with soap and water for at least 20 seconds or clean your hands with an alcohol-based hand  that contains at least 60 to 95% alcohol, covering all surfaces of your hands and rubbing them together until they feel dry  Soap and water should be used preferentially if hands are visibly dirty  Avoid sharing personal household items   You should not share dishes, drinking glasses, cups, eating utensils, towels, or bedding with other people or pets in your home  After using these items, they should be washed thoroughly with soap and water  Clean your hands often  Wash your hands often with soap and water for at least 20 seconds   If soap and water are not available, clean your hands with an alcohol-based hand  that contains at least 60% alcohol, covering all surfaces of your hands and rubbing them together until they feel dry  Soap and water should be used preferentially if hands are visibly dirty  Avoid touching your eyes, nose, and mouth with unwashed hands  Clean all "high-touch" surfaces every day  High touch surfaces include counters, tabletops, doorknobs, bathroom fixtures, toilets, phones, keyboards, tablets, and bedside tables  Also, clean any surfaces that may have blood, stool, or body fluids on them  Use a household cleaning spray or wipe, according to the label instructions  Labels contain instructions for safe and effective use of the cleaning product including precautions you should take when applying the product, such as wearing gloves and making sure you have good ventilation during use of the product  Monitor your symptoms  Seek prompt medical attention if your illness is worsening (e g , difficulty breathing)  Before seeking care, call your healthcare provider and tell them that you have, or are being evaluated for, COVID-19  Put on a facemask before you enter the facility  These steps will help the healthcare provider's office to keep other people in the office or waiting room from getting infectedor exposed  Ask your healthcare provider to call the local or [facility] health department  Persons who are placed under active monitoring or facilitated self-monitoring should follow instructions provided by their local health department or occupational health professionals, as appropriate  If you have a medical emergency and need to call 911, notify the dispatch personnel that you have, or are being evaluated for COVID-19  If possible, put on a facemask before emergency medical services arrive  Discontinuing home isolation  Patients with confirmed COVID-19 should remain under home isolation precautions until the risk of secondary transmission to others is thought to be low   The decision to discontinue home isolation precautions should be made on a case-by-case basis, in consultation with healthcare providers and state and local health departments  For more information: www cdc gov/VHALM35KZ 800530-A 02/24/2020       Stay home when you are sick,except to get medical care  Wash your hands often with soap and water for at least 20 seconds  Cover your cough or sneeze with a tissue, then throw the tissue in the trash  Clean and disinfect frequently touched objects and surfaces  Avoid touching your eyes, nose, and mouth  STOP THE SPREAD OF GERMS  For more information: www cdc gov/COVID19 Avoid close contact with people who are sick  Help prevent the spread of respiratory diseases like COVID-19

## 2020-09-11 NOTE — PROGRESS NOTES
23 yr old transgender female to male adm to [facility]  Increased depression with fleeting SI after an argument with her father, last adm to this unit 12/17 , is pleasant and cooperative, contracts for safety , denies voices

## 2020-09-11 NOTE — ASSESSMENT & PLAN NOTE
· Patient is a transgender female to male who presents to the ED female who presented to the ED due to increased depression, anxiety, and suicidal ideation with a plan to either shoot herself with her father's gun or overdose on pills  Patient reports that these thoughts have increased since day prior, after her dad got in an argument  · Patient prefers to be called Santy Herrera  · Patient has a history of multiple psychiatric admissions at [facility] and [place] [id number]  Follows up outpatient with crisis residential services and has a counselor in [place] and psychiatrist at [facility]     · Psychiatry following

## 2020-09-12 PROCEDURE — G0379 DIRECT REFER HOSPITAL OBSERV: HCPCS

## 2020-09-12 PROCEDURE — 99232 SBSQ HOSP IP/OBS MODERATE 35: CPT | Performed by: PSYCHIATRY & NEUROLOGY

## 2020-09-12 RX ADMIN — ARIPIPRAZOLE 5 MG: 5 TABLET ORAL at 09:33

## 2020-09-12 RX ADMIN — HYDROXYZINE HYDROCHLORIDE 50 MG: 50 TABLET, FILM COATED ORAL at 22:07

## 2020-09-12 RX ADMIN — SERTRALINE HYDROCHLORIDE 100 MG: 100 TABLET ORAL at 09:33

## 2020-09-12 NOTE — TREATMENT TEAM
AM rounds- denies SI, social and attending groups  Elevated anxiety in the evening  Practicing deep breathing  Woke overnight anxious  Able to return to sleep with prn medications

## 2020-09-12 NOTE — PLAN OF CARE
Problem: Anxiety  Goal: Anxiety is at manageable level  Description: Interventions:  - Assess and monitor patient's anxiety level  - Monitor for signs and symptoms (heart palpitations, chest pain, shortness of breath, headaches, nausea, feeling jumpy, restlessness, irritable, apprehensive)  - Collaborate with interdisciplinary team and initiate plan and interventions as ordered    - [place] patient to unit/surroundings  - Explain treatment plan  - Encourage participation in care  - Encourage verbalization of concerns/fears  - Identify coping mechanisms  - Assist in developing anxiety-reducing skills  - Administer/offer alternative therapies  - Limit or eliminate stimulants  Outcome: Progressing     Problem: SELF HARM/SUICIDALITY  Goal: Will have no self-injury during hospital stay  Description: INTERVENTIONS:  - Q 15 MINUTES: Routine safety checks  - Q WAKING SHIFT & PRN: Assess risk to determine if routine checks are adequate to maintain patient safety  - Encourage patient to participate actively in care by formulating a plan to combat response to suicidal ideation, identify supports and resources  Outcome: Progressing     Problem: DEPRESSION  Goal: Will be euthymic at discharge  Description: INTERVENTIONS:  - Administer medication as ordered  - Provide emotional support via 1:1 interaction with staff  - Encourage involvement in milieu/groups/activities  - Monitor for social isolation  Outcome: Progressing

## 2020-09-12 NOTE — PROGRESS NOTES
Progress Note - Behavioral Health     Catina Peres 23 y o  adult MRN: @MRN   Unit/Bed#: [facility] 260-02 Encounter: [id number]    Per nursing report and sign out, patient goes by Oscar Simons (Transgender to male)  Came in after argument with father  Denies SI, social with peers  Was upset last night  Anxious overnight, broken sleep  Catina Peres reports today that so far med is ok presently  Felt panic feelings, but meds help with that  Depression 5/10, anxiety 4/109  No SI or HI  No AVH  'mostly ists just anxiety for the future"       Energy: a bit tired, 'but I am awake'  Sleep: broken sleep  Appetite: normal  Medication side effects: No   ROS: all other systems are negative    Mental Status Evaluation:    Appearance:  age appropriate   Behavior:  pleasant, cooperative, with good eye contact   Speech:  Normal volume, regular rate and rhythm   Mood:  dysphoric, anxious   Affect:  mood congruent, constricted       Thought Process:  goal directed, circumstantial, overregulated but a bit disorganized   Associations: intact associations   Thought Content:  negative thinking and cognitive distortions, no overt delusions   Perceptual Disturbances: no auditory or visual hallcunations   Risk Potential: Suicidal ideation - None  Homicidal ideation - None  Potential for aggression - No   Sensorium:  A&Ox3   Cognition:  grossly intact   Consciousness:  Alert & Awake   Memory:  recent and remote memory grossly intact   Attention: attention span and concentration are age appropriate           Insight:  limited   Judgment: limited   Muscle Strength  Muscle Tone normal  normal   Gait/Station: normal gait/station with good balance   Motor Activity: no abnormal movements       Vital signs in last 24 hours:    Temp:  [96 9 °F (36 1 °C)-98 2 °F (36 8 °C)] 96 9 °F (36 1 °C)  HR:  [73-99] 73  Resp:  [8-20] 8  BP: (111-138)/(57-74) 111/57    Laboratory results:  I have personally reviewed all pertinent laboratory/tests results  Assessment/Plan   Principal Problem:    Depression  Active Problems:    Suicidal ideation    Medical clearance for psychiatric admission    Anemia    Recurrent major depressive disorder (HCC)    Generalized anxiety disorder with panic attacks    PTSD (post-traumatic stress disorder)      Kavitha Lawson is making some progress, rough night last night    Recommended Treatment:     Planned medication and treatment changes: All current active medications have been reviewed    Encourage group therapy, milieu therapy and occupational therapy  [facility] checks as unit standard unless ordered or noted otherwise    Current Facility-Administered Medications   Medication Dose Route Frequency Provider Last Rate    acetaminophen  325 mg Oral Q6H PRN Jayme Pall, PA-C      acetaminophen  650 mg Oral Q4H PRN Jayme Pall, PA-C      aluminum-magnesium hydroxide-simethicone  30 mL Oral Q4H PRN Jayme Pall, PA-C      ARIPiprazole  5 mg Oral Daily Robbin Cox MD      benztropine  1 mg Intramuscular Q6H PRN Jayme Pall, PA-C      benztropine  1 mg Oral Q6H PRN Jayme Pall, PA-C      haloperidol  5 mg Oral Q6H PRN Jayme Pall, PA-C      haloperidol lactate  5 mg Intramuscular Q6H PRN Jayme Pall, PA-C      hydrOXYzine HCL  25 mg Oral Q6H PRN Jayme Pall, PA-C      hydrOXYzine HCL  50 mg Oral Q6H PRN Jayme Pall, PA-C      ibuprofen  600 mg Oral Q6H PRN Jayme Pall, PA-C      LORazepam  2 mg Intramuscular Q6H PRN Jayme Pall, PA-C      LORazepam  1 mg Oral Q6H PRN Jayme Pall, PA-C      magnesium hydroxide  30 mL Oral Daily PRN Jayme Pall, PA-C      OLANZapine  10 mg Intramuscular Q8H PRN Jayme Pall, PA-C      OLANZapine  10 mg Oral Q8H PRN Jayme Pall, PA-C      risperiDONE  1 mg Oral Q3H PRN Jayme Pall, PA-C      sertraline  100 mg Oral Daily Robbin Cox MD  traZODone  50 mg Oral HS PRN Zainab Magana PA-C         1) continue current treatment  2) Continue to support patient and engage them in the programs available as feasible and appropriate  Continue case management support and therapy  Continue discharge planning  Risks / Benefits of Treatment:    Risks, benefits, and possible side effects of medications explained to patient and patient verbalizes understanding and agreement for treatment  Counseling / Coordination of Care:    Medication changes reviewed with nursing staff  Medications, treatment progress and treatment plan reviewed with patient        Phyllis Dubois III, DO  9/12/2020  7:57 AM

## 2020-09-12 NOTE — PROGRESS NOTES
Pt is noted to be social with select peers in the dayroom  Pt is pleasant in conversation with good eye contact  Pt denies SI/HI/AVH and reports she is doing well since "I was raised in isolation"  Pt continued to explained that she grew up much without his family and without having any friends  Pt denied having any questions or concerns at the present time
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
## 2020-09-12 NOTE — PROGRESS NOTES
Pt received atarax 50 mg for a panic attack  Pt was able to reduce stress level and remain in control  Pt returned to his room and was observed sleeping within the hour and remains asleep at this time  Will continue to monitor throughout the night and will note any changes
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
## 2020-09-12 NOTE — PROGRESS NOTES
Pt went to bed late attempting to hang near the nurse's desk  Redirected pt to bed  Pt pleasant and cooperative  Pt slept the rest of the night

## 2020-09-12 NOTE — PROGRESS NOTES
Pt has been visible on unit throughout day, attending groups  Reports improving anxiety, "no panic attacks today " Denies SI, HI, AVH  Calm, cooperative  Upon ending conversation, pt states, "One more thing  What is the national origin or horses?" Pt has been asking multiple staff to print items, such as research articles

## 2020-09-13 PROCEDURE — 99232 SBSQ HOSP IP/OBS MODERATE 35: CPT | Performed by: PSYCHIATRY & NEUROLOGY

## 2020-09-13 PROCEDURE — G0379 DIRECT REFER HOSPITAL OBSERV: HCPCS

## 2020-09-13 RX ADMIN — SERTRALINE HYDROCHLORIDE 100 MG: 100 TABLET ORAL at 09:27

## 2020-09-13 RX ADMIN — ARIPIPRAZOLE 5 MG: 5 TABLET ORAL at 09:27

## 2020-09-13 NOTE — CONSULTS
Consult- Rohan Osborne 2000, 23 y o  adult MRN: [id number]    Unit/Bed#: [facility] 171-00 Encounter: [id number]    Primary Care Provider: Maicol Honeycutt MD   Date and time admitted to hospital: 9/10/2020  7:41 PM      Inpatient consult for Medical Clearance for [facility] patient  Consult performed by: Teresita Milan PA-C  Consult ordered by: Izzy Hayes PA-C        Please refer to full consult note dated 9/11/20 for further details

## 2020-09-13 NOTE — TREATMENT TEAM
AM rounds- pleasant and social  Fleeting SI -verbally contracts for safety  Evenings increased anxiety and required prn medications  Slept well

## 2020-09-13 NOTE — PROGRESS NOTES
Remains calm and cooperative  Visible and social with peers, attends groups  Denies SI/HI/AVH  Denies questions/concerns

## 2020-09-13 NOTE — PROGRESS NOTES
Progress Note - Behavioral Health     Nirumelissa Nuñez 23 y o  adult MRN: @MRN   Unit/Bed#: [facility] 260-02 Encounter: [id number]    Per nursing report and sign out, patient had fleeting SI, pleasant, social  Struggles more in evening  Ativan PRN effective, no panic attack and did sleep  Niru Nuñez reports today that notse "I have been less harried" but feels environent is better here  Depression 2/10, anxiety 3/10  No SI, no AVH, no HI  Energy: good  Sleep: normal  Appetite: normal  Medication side effects: No   ROS: all other systems are negative    Mental Status Evaluation:    Appearance:  age appropriate   Behavior:  pleasant, cooperative, with good eye contact   Speech:  Normal volume, regular rate and rhythm   Mood:  depressed and anxious   Affect:  brighter with some improvement       Thought Process:  Linear and goal directed, circumferential   Associations: intact associations   Thought Content:  negative thinking and cognitive distortions   Perceptual Disturbances: no auditory or visual hallcunations   Risk Potential: Suicidal ideation - None  Homicidal ideation - None  Potential for aggression - No   Sensorium:  A&Ox3   Cognition:  grossly intact   Consciousness:  Alert & Awake   Memory:  recent and remote memory grossly intact   Attention: attention span and concentration are age appropriate           Insight:  improving   Judgment: improving   Muscle Strength  Muscle Tone normal  normal   Gait/Station: normal gait/station with good balance   Motor Activity: no abnormal movements       Vital signs in last 24 hours:    Temp:  [96 9 °F (36 1 °C)-97 9 °F (36 6 °C)] 97 9 °F (36 6 °C)  HR:  [67-85] 67  Resp:  [16] 16  BP: ()/(50-76) 83/50    Laboratory results:  I have personally reviewed all pertinent laboratory/tests results      Assessment/Plan   Principal Problem:    Depression  Active Problems:    Suicidal ideation    Medical clearance for psychiatric admission    Anemia    Recurrent major depressive disorder ([address] )    Generalized anxiety disorder with panic attacks    PTSD (post-traumatic stress disorder)      Yvonne Hannaine is making some progress    Recommended Treatment:     Planned medication and treatment changes: All current active medications have been reviewed  Encourage group therapy, milieu therapy and occupational therapy  [facility] checks as unit standard unless ordered or noted otherwise    Current Facility-Administered Medications   Medication Dose Route Frequency Provider Last Rate    acetaminophen  325 mg Oral Q6H PRN [facility], PA-C      acetaminophen  650 mg Oral Q4H PRN [facility], PA-C      aluminum-magnesium hydroxide-simethicone  30 mL Oral Q4H PRN [facility], PA-C      ARIPiprazole  5 mg Oral Daily Ángela Jordan MD      benztropine  1 mg Intramuscular Q6H PRN [facility], PA-C      benztropine  1 mg Oral Q6H PRN [facility], PA-C      haloperidol  5 mg Oral Q6H PRN [facility], PA-C      haloperidol lactate  5 mg Intramuscular Q6H PRN [facility], PA-C      hydrOXYzine HCL  25 mg Oral Q6H PRN [facility], PA-C      hydrOXYzine HCL  50 mg Oral Q6H PRN [facility], PA-C      ibuprofen  600 mg Oral Q6H PRN [facility], PA-C      LORazepam  2 mg Intramuscular Q6H PRN [facility], PA-C      LORazepam  1 mg Oral Q6H PRN [facility], PA-C      magnesium hydroxide  30 mL Oral Daily PRN [facility], PA-C      OLANZapine  10 mg Intramuscular Q8H PRN [facility], PA-C      OLANZapine  10 mg Oral Q8H PRN [facility], PA-C      risperiDONE  1 mg Oral Q3H PRN [facility], PA-C      sertraline  100 mg Oral Daily Ángela Jordan MD      traZODone  50 mg Oral HS PRN [facility], PA-C         1) continue current treatment  2) Continue to support patient and engage them in the programs available as feasible and appropriate  Continue case management support and therapy  Continue discharge planning  Risks / Benefits of Treatment:    Risks, benefits, and possible side effects of medications explained to patient and patient verbalizes understanding and agreement for treatment  Counseling / Coordination of Care:    Medication changes reviewed with nursing staff  Medications, treatment progress and treatment plan reviewed with patient        Argentina Herr III, DO  9/13/2020  8:21 AM

## 2020-09-13 NOTE — PROGRESS NOTES
Pt requesting medication for anxiety  Reports having panic attacks in the late evenings and now having fear of having one  Received prn atarax 50mg at 2207  Reports helpful  Pt able to fall asleep and currently remains asleep  Will continue to monitor throughout the night and will note any change in status

## 2020-09-13 NOTE — PLAN OF CARE
Problem: Anxiety  Goal: Anxiety is at manageable level  Description: Interventions:  - Assess and monitor patient's anxiety level  - Monitor for signs and symptoms (heart palpitations, chest pain, shortness of breath, headaches, nausea, feeling jumpy, restlessness, irritable, apprehensive)  - Collaborate with interdisciplinary team and initiate plan and interventions as ordered    - [place] patient to unit/surroundings  - Explain treatment plan  - Encourage participation in care  - Encourage verbalization of concerns/fears  - Identify coping mechanisms  - Assist in developing anxiety-reducing skills  - Administer/offer alternative therapies  - Limit or eliminate stimulants  Outcome: Progressing     Problem: SELF HARM/SUICIDALITY  Goal: Will have no self-injury during hospital stay  Description: INTERVENTIONS:  - Q 15 MINUTES: Routine safety checks  - Q WAKING SHIFT & PRN: Assess risk to determine if routine checks are adequate to maintain patient safety  - Encourage patient to participate actively in care by formulating a plan to combat response to suicidal ideation, identify supports and resources  Outcome: Progressing     Problem: DEPRESSION  Goal: Will be euthymic at discharge  Description: INTERVENTIONS:  - Administer medication as ordered  - Provide emotional support via 1:1 interaction with staff  - Encourage involvement in milieu/groups/activities  - Monitor for social isolation  Outcome: Progressing

## 2020-09-14 LAB — RPR SER QL: NORMAL

## 2020-09-14 PROCEDURE — 99232 SBSQ HOSP IP/OBS MODERATE 35: CPT | Performed by: STUDENT IN AN ORGANIZED HEALTH CARE EDUCATION/TRAINING PROGRAM

## 2020-09-14 PROCEDURE — G0379 DIRECT REFER HOSPITAL OBSERV: HCPCS

## 2020-09-14 RX ADMIN — SERTRALINE HYDROCHLORIDE 100 MG: 100 TABLET ORAL at 09:24

## 2020-09-14 RX ADMIN — ARIPIPRAZOLE 5 MG: 5 TABLET ORAL at 09:24

## 2020-09-14 NOTE — PLAN OF CARE
Pt spontaneously engaged in group discussion and reported self awareness that she does get irritable about [place] things  Pt  was progressively more expressive as the day went on    Problem: Ineffective Coping  Goal: Participates in unit activities  Description: Interventions:  - Provide therapeutic environment   - Provide required programming   - Redirect inappropriate behaviors   Outcome: Progressing

## 2020-09-14 NOTE — PROGRESS NOTES
09/14/20 1000 09/14/20 7830   Activity/Group Checklist   Group [employer] meeting Life Skills  ("crab task" on self awareness of triggers and relaxation )   Attendance Attended Attended   Attendance Duration (min) 31-45 46-60   Interactions Interacted appropriately  (when called upon only ) Interacted appropriately   Affect/Mood Constricted Appropriate;Bright;Normal range  (spontaneous in expressing her triggers )   Goals Achieved Able to listen to others; Identified feelings; Discussed coping strategies  (pt  quiet in session ) Able to engage in interactions; Discussed coping strategies; Identified feelings; Identified triggers

## 2020-09-14 NOTE — PROGRESS NOTES
Pt in day room watching a movie with peers  Calm, polite, cooperative during interaction  Bright affect  Denies SI/HI/AVH  Reports feeling overwhelmed in a good way  Received news from  that there is a couple that will be a support for a place to live upon discharge  States feeling good on medications having no concerns or questions at this time  Attends groups, social with peers, sleeping well

## 2020-09-14 NOTE — PROGRESS NOTES
Status: Pt has been calm & cooperative  She expresses anxiety in the evening & says she worries about having a panic attack  Medication: no changes / no PRNs  D/C: end of week(?) / Pt has a therapist & psychiatrist   CM is working on [employer] referral, however, Pt is stating she won't go back to father's home         09/14/20 0800   Team Meeting   Meeting Type Daily Rounds   Team Members Present   Team Members Present Physician;Nurse;;Occupational Therapist   Physician Team Member Dr Mateo Negrete / Mahesh Betancourt Team Member Carlos Pink / Jayson Meadows Management Team Member Lobo Aviles / Sirena Croft   OT Team Member Alhaji Stone   Patient/Family Present   Patient Present No   Patient's Family Present No

## 2020-09-14 NOTE — PROGRESS NOTES
Progress Note - Behavioral Health   Rohan Osborne 23 y o  adult MRN: [id number]  Unit/Bed#: [facility] 260-02 Encounter: [id number]    Assessment/Plan   Principal Problem:    Depression  Active Problems:    Suicidal ideation    Medical clearance for psychiatric admission    Anemia    Recurrent major depressive disorder (HCC)    Generalized anxiety disorder with panic attacks    PTSD (post-traumatic stress disorder)    Patient was seen, chart reviewed and case discussed with that team  As per report patient did not have any behavioral issues over the weekend  Patient says that her mood was mainly due to situational with her being at father's house  She feels much better in the hospital and does not want to return to his father's house as it was aggravating mood  Sleep and appetite are improved  Anxiety under control  Denies any thoughts to hurt self  Behavior over the last 24 hours:  improved  Sleep: normal  Appetite: normal  Medication side effects: No  ROS: no complaints    Mental Status Evaluation:  Appearance:  age appropriate and overweight   Behavior:  normal and Cooperative   Speech:  normal pitch and normal volume   Mood:  euthymic   Affect:  constricted   Thought Process:  goal directed and logical   Thought Content:  normal   Perceptual Disturbances: None   Risk Potential: Suicidal Ideations none  Homicidal Ideations none  Potential for Aggression No   Sensorium:  person, place, time/date, situation, day of week, month of year and year   Memory:  recent and remote memory grossly intact   Consciousness:  alert and awake    Attention: attention span and concentration were age appropriate   Insight:  fair   Judgment: fair   Gait/Station: normal gait/station   Motor Activity: no abnormal movements     Progress Toward Goals:  Progressing    Recommended Treatment: Continue with pharmacotherapy, group therapy, milieu therapy and occupational therapy        Risks, benefits and possible side effects of Medications: Risks, benefits, and possible side effects of medications explained to patient and patient verbalizes understanding  Risks of medications in pregnancy explained if female patient  Patient verbalizes understanding and agrees to notify her doctor if she becomes pregnant      Medications:   all current active meds have been reviewed, continue current psychiatric medications and current meds:   Current Facility-Administered Medications   Medication Dose Route Frequency    acetaminophen (TYLENOL) tablet 325 mg  325 mg Oral Q6H PRN    acetaminophen (TYLENOL) tablet 650 mg  650 mg Oral Q4H PRN    aluminum-magnesium hydroxide-simethicone (MYLANTA) 200-200-20 mg/5 mL oral suspension 30 mL  30 mL Oral Q4H PRN    ARIPiprazole (ABILIFY) tablet 5 mg  5 mg Oral Daily    benztropine (COGENTIN) injection 1 mg  1 mg Intramuscular Q6H PRN    benztropine (COGENTIN) tablet 1 mg  1 mg Oral Q6H PRN    haloperidol (HALDOL) tablet 5 mg  5 mg Oral Q6H PRN    haloperidol lactate (HALDOL) injection 5 mg  5 mg Intramuscular Q6H PRN    hydrOXYzine HCL (ATARAX) tablet 25 mg  25 mg Oral Q6H PRN    hydrOXYzine HCL (ATARAX) tablet 50 mg  50 mg Oral Q6H PRN    ibuprofen (MOTRIN) tablet 600 mg  600 mg Oral Q6H PRN    LORazepam (ATIVAN) injection 2 mg  2 mg Intramuscular Q6H PRN    LORazepam (ATIVAN) tablet 1 mg  1 mg Oral Q6H PRN    magnesium hydroxide (MILK OF MAGNESIA) 400 mg/5 mL oral suspension 30 mL  30 mL Oral Daily PRN    OLANZapine (ZyPREXA) IM injection 10 mg  10 mg Intramuscular Q8H PRN    OLANZapine (ZyPREXA) tablet 10 mg  10 mg Oral Q8H PRN    risperiDONE (RisperDAL M-TABS) dispersible tablet 1 mg  1 mg Oral Q3H PRN    sertraline (ZOLOFT) tablet 100 mg  100 mg Oral Daily    traZODone (DESYREL) tablet 50 mg  50 mg Oral HS PRN

## 2020-09-14 NOTE — PROGRESS NOTES
Polite during interaction  Attending groups and social with peers  Denies SI/HI or hallucinations at this time  Patient spoke with his rabbi and does not plan on returning to his fathers, planning to stay with a couple from [place] where he was referred by   Patient rely's on rabbi in times of crisis  Compliant with medications, denies side effects, concerned about how they may interact with testosterone which is the plans once the cost and whether insurance is decided

## 2020-09-14 NOTE — CASE MANAGEMENT
AKIN received a return call from Pt's therapist, Janki Tao, who reported that she initially had seen Pt & his father for some session, but then when [place] happened & they went to only [facility], she only saw Pt & didn't continue with his father  Thao said that Pt had texted her making suicidal ideation/threats & she called crisis, who then call 911  Thao said that she had been begging Pt & her father to call & self referral for [facility] or Alaina Griffith or [facility] for an ICM  Thao said that she had given them the information at least 3 times, & they never called  CM reviewed that in 2017, during Pt's previous admission, she had referred Pt for ICM services, but she never followed-up  Thao said that Pt frequently does express some type of ideation, so she had increased her contact to two sessions/week for the past month or so  Pt is currently scheduled for telehealth sessions on Mon at 4 PM & Weds & 2 PM   AKIN confirmed fax number [phone] to send discharge information

## 2020-09-15 PROCEDURE — G0379 DIRECT REFER HOSPITAL OBSERV: HCPCS

## 2020-09-15 PROCEDURE — 99233 SBSQ HOSP IP/OBS HIGH 50: CPT | Performed by: PHYSICIAN ASSISTANT

## 2020-09-15 RX ORDER — FERROUS SULFATE 325(65) MG
325 TABLET ORAL 2 TIMES DAILY WITH MEALS
Status: DISCONTINUED | OUTPATIENT
Start: 2020-09-15 | End: 2020-09-18 | Stop reason: HOSPADM

## 2020-09-15 RX ORDER — DOCUSATE SODIUM 100 MG/1
100 CAPSULE, LIQUID FILLED ORAL 2 TIMES DAILY
Status: DISCONTINUED | OUTPATIENT
Start: 2020-09-15 | End: 2020-09-18 | Stop reason: HOSPADM

## 2020-09-15 RX ADMIN — DOCUSATE SODIUM 100 MG: 100 CAPSULE, LIQUID FILLED ORAL at 12:02

## 2020-09-15 RX ADMIN — ARIPIPRAZOLE 5 MG: 5 TABLET ORAL at 09:11

## 2020-09-15 RX ADMIN — SERTRALINE HYDROCHLORIDE 100 MG: 100 TABLET ORAL at 09:11

## 2020-09-15 RX ADMIN — DOCUSATE SODIUM 100 MG: 100 CAPSULE, LIQUID FILLED ORAL at 17:12

## 2020-09-15 RX ADMIN — FERROUS SULFATE TAB 325 MG (65 MG ELEMENTAL FE) 325 MG: 325 (65 FE) TAB at 16:10

## 2020-09-15 RX ADMIN — FERROUS SULFATE TAB 325 MG (65 MG ELEMENTAL FE) 325 MG: 325 (65 FE) TAB at 12:02

## 2020-09-15 NOTE — PROGRESS NOTES
Status: Pt was pleasant & cooperative, social   Pt reported that she spoke with her [employer], who identified a couple she can stay with  Pt was reportedly going to call her father last night to tell him this      Medication: no changes / no PRN   D/C: end of week(?) / CM submitted [facility] referral yesterday & will follow-up today regarding timeline     09/15/20 0800   Team Meeting   Meeting Type Daily Rounds   Team Members Present   Team Members Present Physician;Nurse;;Occupational Therapist   Physician Team Member Dr Judith Copeland / Xi Whitehead Team Member Nolan Ruano / Sandra Shen Management Team Member Eunice Alarcon / Elizabeth Moreno   OT Team Member Phani   Patient/Family Present   Patient Present No   Patient's Family Present No

## 2020-09-15 NOTE — PROGRESS NOTES
Polite during interaction  Compliant with medications and meals  Denies SI/HI, AH/VH  Denies depression  Denies anxiety initially then stated uncertain if he had a panic attack overnight while sleeping in the depths of his sleep  Attempted to contact dad, no answer and did not leave a message  Continues to plan to stay with a couple the  suggested to stay with  Content on the unit do to the socialization, at home he is lonely and has no one to speak with

## 2020-09-15 NOTE — PLAN OF CARE
Problem: Ineffective Coping  Goal: Identifies ineffective coping skills  Outcome: Progressing  Goal: Demonstrates healthy coping skills  Outcome: Progressing  Goal: Participates in unit activities  Description: Interventions:  - Provide therapeutic environment   - Provide required programming   - Redirect inappropriate behaviors   Outcome: Progressing     Problem: Anxiety  Goal: Anxiety is at manageable level  Description: Interventions:  - Assess and monitor patient's anxiety level  - Monitor for signs and symptoms (heart palpitations, chest pain, shortness of breath, headaches, nausea, feeling jumpy, restlessness, irritable, apprehensive)  - Collaborate with interdisciplinary team and initiate plan and interventions as ordered    - [place] patient to unit/surroundings  - Explain treatment plan  - Encourage participation in care  - Encourage verbalization of concerns/fears  - Identify coping mechanisms  - Assist in developing anxiety-reducing skills  - Administer/offer alternative therapies  - Limit or eliminate stimulants  Outcome: Progressing     Problem: SELF HARM/SUICIDALITY  Goal: Will have no self-injury during hospital stay  Description: INTERVENTIONS:  - Q 15 MINUTES: Routine safety checks  - Q WAKING SHIFT & PRN: Assess risk to determine if routine checks are adequate to maintain patient safety  - Encourage patient to participate actively in care by formulating a plan to combat response to suicidal ideation, identify supports and resources  Outcome: Progressing     Problem: DEPRESSION  Goal: Will be euthymic at discharge  Description: INTERVENTIONS:  - Administer medication as ordered  - Provide emotional support via 1:1 interaction with staff  - Encourage involvement in milieu/groups/activities  - Monitor for social isolation  Outcome: Progressing     Problem: DISCHARGE PLANNING  Goal: Discharge to home or other facility with appropriate resources  Description: INTERVENTIONS:  - Identify barriers to discharge w/patient and caregiver  - Arrange for needed discharge resources and transportation as appropriate  - Identify discharge learning needs (meds, wound care, etc )  - Arrange for interpretive services to assist at discharge as needed  - Refer to Case Management Department for coordinating discharge planning if the patient needs post-hospital services based on physician/advanced practitioner order or complex needs related to functional status, cognitive ability, or social support system  Outcome: Progressing

## 2020-09-15 NOTE — CASE MANAGEMENT
CM attempted to contact [facility] program @ [phone], but continued to reach a busy signal   CM contacted the [facility] office @ [phone] & left a message seeking a returned call      CM received a voicemail from [facility], stating that Pt is scheduled with Dr Rosangela Cespedes on 9/29 at 2:30 PM

## 2020-09-15 NOTE — PROGRESS NOTES
Patient has been visible in the milieu and social with peers  Pleasant on approach  Denies suicidal and homicidal ideations, denies pain, denies depression  Mild anxiety noted  Cooperative with medications  Denies questions or concerns at this time

## 2020-09-15 NOTE — PROGRESS NOTES
Progress Note - Behavioral Health   Rodrick Bey 23 y o  adult MRN: [id number]  Unit/Bed#: [facility] 260-02 Encounter: [id number]    Assessment/Plan   Principal Problem:    Depression  Active Problems:    Suicidal ideation    Medical clearance for psychiatric admission    Anemia    Recurrent major depressive disorder (HCC)    Generalized anxiety disorder with panic attacks    PTSD (post-traumatic stress disorder)      Subjective:  Patient seen out attending groups and social with her peers  States depression and anxiety are slowly decreasing  If unable to go to newly found housing states that she feels like living with her father is like a death wish  Has diminished passive death wishes and contracts for safety  Appears to have brighter affect  Reports energy is low  Sleeping and eating well  Appears anxiety is decreased  Denied psychosis  Denied delusional material   No signs of josafat or agitation  Denied PTSD related symptoms  Medication compliant  Appears to be tolerating medications well without serious side effects  Somatic complaint of constipation        Current Medications:  Current Facility-Administered Medications   Medication Dose Route Frequency    acetaminophen (TYLENOL) tablet 325 mg  325 mg Oral Q6H PRN    acetaminophen (TYLENOL) tablet 650 mg  650 mg Oral Q4H PRN    aluminum-magnesium hydroxide-simethicone (MYLANTA) 200-200-20 mg/5 mL oral suspension 30 mL  30 mL Oral Q4H PRN    ARIPiprazole (ABILIFY) tablet 5 mg  5 mg Oral Daily    benztropine (COGENTIN) injection 1 mg  1 mg Intramuscular Q6H PRN    benztropine (COGENTIN) tablet 1 mg  1 mg Oral Q6H PRN    docusate sodium (COLACE) capsule 100 mg  100 mg Oral BID    ferrous sulfate tablet 325 mg  325 mg Oral BID With Meals    haloperidol (HALDOL) tablet 5 mg  5 mg Oral Q6H PRN    haloperidol lactate (HALDOL) injection 5 mg  5 mg Intramuscular Q6H PRN    hydrOXYzine HCL (ATARAX) tablet 25 mg  25 mg Oral Q6H PRN    hydrOXYzine HCL (ATARAX) tablet 50 mg  50 mg Oral Q6H PRN    ibuprofen (MOTRIN) tablet 600 mg  600 mg Oral Q6H PRN    LORazepam (ATIVAN) injection 2 mg  2 mg Intramuscular Q6H PRN    LORazepam (ATIVAN) tablet 1 mg  1 mg Oral Q6H PRN    magnesium hydroxide (MILK OF MAGNESIA) 400 mg/5 mL oral suspension 30 mL  30 mL Oral Daily PRN    OLANZapine (ZyPREXA) IM injection 10 mg  10 mg Intramuscular Q8H PRN    OLANZapine (ZyPREXA) tablet 10 mg  10 mg Oral Q8H PRN    risperiDONE (RisperDAL M-TABS) dispersible tablet 1 mg  1 mg Oral Q3H PRN    sertraline (ZOLOFT) tablet 100 mg  100 mg Oral Daily    traZODone (DESYREL) tablet 50 mg  50 mg Oral HS PRN       Behavioral Health Medications: all current active meds have been reviewed and continue current psychiatric medications  Vitals:  Vitals:    09/15/20 0725   BP: 97/54   Pulse: 73   Resp: 18   Temp: (!) 97 °F (36 1 °C)   SpO2:        Laboratory results:    I have personally reviewed all pertinent laboratory/tests results    Most Recent Labs:   Lab Results   Component Value Date    WBC 6 43 09/11/2020    RBC 3 72 (L) 09/11/2020    HGB 10 2 (L) 09/11/2020    HCT 32 6 (L) 09/11/2020     09/11/2020    RDW 12 3 09/11/2020    NEUTROABS 3 50 09/11/2020    SODIUM 138 09/11/2020    K 3 8 09/11/2020     09/11/2020    CO2 29 09/11/2020    BUN 12 09/11/2020    CREATININE 0 67 09/11/2020    GLUC 83 09/11/2020    GLUF 83 09/11/2020    CALCIUM 8 5 09/11/2020    AST 11 09/11/2020    ALT 12 09/11/2020    ALKPHOS 42 (L) 09/11/2020    TP 6 8 09/11/2020    ALB 3 0 (L) 09/11/2020    TBILI 0 20 09/11/2020    CHOLESTEROL 142 09/11/2020    HDL 51 09/11/2020    TRIG 35 09/11/2020    LDLCALC 84 09/11/2020    [place] 91 09/11/2020    KNZ1CCAANYFX 2 298 09/11/2020    PREGSERUM Negative 09/11/2020    RPR Non-Reactive 09/11/2020       Psychiatric Review of Systems:  Behavior over the last 24 hours:  improved  Sleep: normal  Appetite: normal  Medication side effects: No  ROS: no complaints, all others negative    Mental Status Evaluation:  Appearance:  casually dressed   Behavior:  less guarded   Speech:  normal pitch and normal volume   Mood:  less depressed and anxious   Affect:  constricted   Language appropriate   Thought Process:  Goal directed and linear   Thought Content:  normal   Perceptual Disturbances: None   Risk Potential: Diminished passive death wishes  Denied HI  Potential for aggression: no   Sensorium:  person, place and time/date   Cognition:  recent and remote memory grossly intact   Consciousness:  alert and awake    Attention: attention span and concentration were age appropriate   Insight:  partial   Judgment: partial   Gait/Station: normal gait/station and normal balance   Motor Activity: no abnormal movements     Progress Toward Goals: progressing    Recommended Treatment: Continue with group therapy, milieu therapy and occupational therapy  1   Add Ferrous Sulfate 325mg BID for iron deficiency anemia  2  Add Colace 100mg BID for constipation  3  Continue other medications  4  Disposition planning     Risks, benefits and possible side effects of Medications:   Risks, benefits, and possible side effects of medications explained to patient and patient verbalizes understanding        Perlita Murphy PA-C

## 2020-09-15 NOTE — PLAN OF CARE
Problem: Ineffective Coping  Goal: Identifies ineffective coping skills  Outcome: Progressing  Goal: Demonstrates healthy coping skills  Outcome: Progressing  Goal: Participates in unit activities  Description: Interventions:  - Provide therapeutic environment   - Provide required programming   - Redirect inappropriate behaviors   Outcome: Progressing     Problem: Anxiety  Goal: Anxiety is at manageable level  Description: Interventions:  - Assess and monitor patient's anxiety level  - Monitor for signs and symptoms (heart palpitations, chest pain, shortness of breath, headaches, nausea, feeling jumpy, restlessness, irritable, apprehensive)  - Collaborate with interdisciplinary team and initiate plan and interventions as ordered    - [place] patient to unit/surroundings  - Explain treatment plan  - Encourage participation in care  - Encourage verbalization of concerns/fears  - Identify coping mechanisms  - Assist in developing anxiety-reducing skills  - Administer/offer alternative therapies  - Limit or eliminate stimulants  Outcome: Progressing     Problem: SELF HARM/SUICIDALITY  Goal: Will have no self-injury during hospital stay  Description: INTERVENTIONS:  - Q 15 MINUTES: Routine safety checks  - Q WAKING SHIFT & PRN: Assess risk to determine if routine checks are adequate to maintain patient safety  - Encourage patient to participate actively in care by formulating a plan to combat response to suicidal ideation, identify supports and resources  Outcome: Progressing     Problem: DEPRESSION  Goal: Will be euthymic at discharge  Description: INTERVENTIONS:  - Administer medication as ordered  - Provide emotional support via 1:1 interaction with staff  - Encourage involvement in milieu/groups/activities  - Monitor for social isolation  Outcome: Progressing     Problem: DISCHARGE PLANNING  Goal: Discharge to home or other facility with appropriate resources  Description: INTERVENTIONS:  - Identify barriers to discharge w/patient and caregiver  - Arrange for needed discharge resources and transportation as appropriate  - Identify discharge learning needs (meds, wound care, etc )  - Arrange for interpretive services to assist at discharge as needed  - Refer to Case Management Department for coordinating discharge planning if the patient needs post-hospital services based on physician/advanced practitioner order or complex needs related to functional status, cognitive ability, or social support system  Outcome: Progressing

## 2020-09-16 PROCEDURE — 99232 SBSQ HOSP IP/OBS MODERATE 35: CPT | Performed by: STUDENT IN AN ORGANIZED HEALTH CARE EDUCATION/TRAINING PROGRAM

## 2020-09-16 PROCEDURE — G0379 DIRECT REFER HOSPITAL OBSERV: HCPCS

## 2020-09-16 RX ADMIN — FERROUS SULFATE TAB 325 MG (65 MG ELEMENTAL FE) 325 MG: 325 (65 FE) TAB at 17:03

## 2020-09-16 RX ADMIN — ARIPIPRAZOLE 5 MG: 5 TABLET ORAL at 09:20

## 2020-09-16 RX ADMIN — DOCUSATE SODIUM 100 MG: 100 CAPSULE, LIQUID FILLED ORAL at 17:03

## 2020-09-16 RX ADMIN — DOCUSATE SODIUM 100 MG: 100 CAPSULE, LIQUID FILLED ORAL at 09:21

## 2020-09-16 RX ADMIN — SERTRALINE HYDROCHLORIDE 100 MG: 100 TABLET ORAL at 09:20

## 2020-09-16 RX ADMIN — FERROUS SULFATE TAB 325 MG (65 MG ELEMENTAL FE) 325 MG: 325 (65 FE) TAB at 09:20

## 2020-09-16 NOTE — CASE MANAGEMENT
CM met with Pt who reported she had received an edible arrangement from her [employer] yesterday, which was very nice  CM asked if Pt had made contact with her father yet, & she said no  She said that she didn't try to call him yesterday, but would do so today  Pt said that she does plan to go to her home to get belongings first, & then her rabbi will assist with arranging transportation to the home in [place], where she will be staying  Pt said that she feels such relieve that she won't be returning to her father's house & said that she feels she can more forward & do more now  Pt had no questions regarding tentative d/c plans for Friday  CM contacted [facility] @ [phone] opt 4 & left a 2nd message for Radha Ray of [address] [facility] psych rehab & independent living program, regarding the referral CM faxed on 9/14 & requesting a return call  CM contacted [facility] [phone] & spoke with Ross Gaspar, who reported she didn't have a direct number for [facility], however, could provide the general number for [facility]: [phone]

## 2020-09-16 NOTE — PROGRESS NOTES
Status: Pt denied any SI/HI  She is pleasant & cooperative  She slept overnight  No issues/concerns to report  Medication: Colace & Ferrous Sulfate started / no PRN   D/C: Friday / referral made to [facility] called & left a message & is just waiting for a returned call  Pt has made plans to stay with a couple through her rabbi    She has follow-up appointments with her psychiatrist & therapist      09/16/20 0800   Team Meeting   Meeting Type Daily Rounds   Team Members Present   Team Members Present Physician;Nurse;;Occupational Therapist   Physician Team Member Dr Beatriz Clark / Kamla [place] Team Member Katya Cardoso / Mabel Page Management Team Member Julian Whittington / Marine Long   OT Team Member Phani   Patient/Family Present   Patient Present No   Patient's Family Present No

## 2020-09-16 NOTE — PLAN OF CARE
Problem: Ineffective Coping  Goal: Identifies ineffective coping skills  Outcome: Progressing  Goal: Demonstrates healthy coping skills  Outcome: Progressing     Problem: Anxiety  Goal: Anxiety is at manageable level  Description: Interventions:  - Assess and monitor patient's anxiety level  - Monitor for signs and symptoms (heart palpitations, chest pain, shortness of breath, headaches, nausea, feeling jumpy, restlessness, irritable, apprehensive)  - Collaborate with interdisciplinary team and initiate plan and interventions as ordered    - [place] patient to unit/surroundings  - Explain treatment plan  - Encourage participation in care  - Encourage verbalization of concerns/fears  - Identify coping mechanisms  - Assist in developing anxiety-reducing skills  - Administer/offer alternative therapies  - Limit or eliminate stimulants  Outcome: Progressing     Problem: SELF HARM/SUICIDALITY  Goal: Will have no self-injury during hospital stay  Description: INTERVENTIONS:  - Q 15 MINUTES: Routine safety checks  - Q WAKING SHIFT & PRN: Assess risk to determine if routine checks are adequate to maintain patient safety  - Encourage patient to participate actively in care by formulating a plan to combat response to suicidal ideation, identify supports and resources  Outcome: Progressing     Problem: DEPRESSION  Goal: Will be euthymic at discharge  Description: INTERVENTIONS:  - Administer medication as ordered  - Provide emotional support via 1:1 interaction with staff  - Encourage involvement in milieu/groups/activities  - Monitor for social isolation  Outcome: Progressing

## 2020-09-16 NOTE — PROGRESS NOTES
Pt reports having a restful night sleep  Pt reported feeling "better" from admission  Pt reported that she is not returning home to her father's on discharge  Pt reported she is going to a home that helps people in difficult situations  Pt reported medications are helping her feel better and has no questions or concerns about treatment

## 2020-09-16 NOTE — PROGRESS NOTES
Pt visible on unit attending groups and sitting in the day room  Pt reports talking with the doctor today and being told possible discharge Friday and is excited about discharge and feels ready  Pt reports not be able to get in touch with her father to inform him that she isn't returning home but will continue try to arrange a time to get her belongings

## 2020-09-16 NOTE — PROGRESS NOTES
Progress Note - Behavioral Health   Nakia Bur 23 y o  adult MRN: [id number]  Unit/Bed#: [facility] 260-02 Encounter: [id number]    Assessment/Plan   Principal Problem:    Depression  Active Problems:    Suicidal ideation    Medical clearance for psychiatric admission    Anemia    Recurrent major depressive disorder ([address] )    Generalized anxiety disorder with panic attacks    PTSD (post-traumatic stress disorder)    Patient was seen, chart reviewed and case discussed with team  Patient says that her mood is getting better and happy that she is not going to her father's house  She is visible on the unit and participating in groups  Denies any thoughts to hurt self or others  Behavior over the last 24 hours:  improved  Sleep: normal  Appetite: normal  Medication side effects: No  ROS: no complaints    Continue current medications    Mental Status Evaluation:  Appearance:  age appropriate and casually dressed   Behavior:  normal and cooperative, pleasant   Speech:  normal pitch and normal volume   Mood:  euthymic   Affect:  mood-congruent   Thought Process:  goal directed and logical   Thought Content:  normal   Perceptual Disturbances: None   Risk Potential: Suicidal Ideations none  Homicidal Ideations none  Potential for Aggression No   Sensorium:  person, place, time/date, situation, day of week, month of year and year   Memory:  recent and remote memory grossly intact   Consciousness:  alert and awake    Attention: attention span and concentration were age appropriate   Insight:  fair   Judgment: fair   Gait/Station: normal gait/station   Motor Activity: no abnormal movements     Progress Toward Goals: Progressing    Recommended Treatment: Continue with group therapy, milieu therapy and occupational therapy  Risks, benefits and possible side effects of Medications:   Risks, benefits, and possible side effects of medications explained to patient and patient verbalizes understanding     Risks of medications in pregnancy explained if female patient  Patient verbalizes understanding and agrees to notify her doctor if she becomes pregnant      Medications:   all current active meds have been reviewed, continue current psychiatric medications and current meds:   Current Facility-Administered Medications   Medication Dose Route Frequency    acetaminophen (TYLENOL) tablet 325 mg  325 mg Oral Q6H PRN    acetaminophen (TYLENOL) tablet 650 mg  650 mg Oral Q4H PRN    aluminum-magnesium hydroxide-simethicone (MYLANTA) 200-200-20 mg/5 mL oral suspension 30 mL  30 mL Oral Q4H PRN    ARIPiprazole (ABILIFY) tablet 5 mg  5 mg Oral Daily    benztropine (COGENTIN) injection 1 mg  1 mg Intramuscular Q6H PRN    benztropine (COGENTIN) tablet 1 mg  1 mg Oral Q6H PRN    docusate sodium (COLACE) capsule 100 mg  100 mg Oral BID    ferrous sulfate tablet 325 mg  325 mg Oral BID With Meals    haloperidol (HALDOL) tablet 5 mg  5 mg Oral Q6H PRN    haloperidol lactate (HALDOL) injection 5 mg  5 mg Intramuscular Q6H PRN    hydrOXYzine HCL (ATARAX) tablet 25 mg  25 mg Oral Q6H PRN    hydrOXYzine HCL (ATARAX) tablet 50 mg  50 mg Oral Q6H PRN    ibuprofen (MOTRIN) tablet 600 mg  600 mg Oral Q6H PRN    LORazepam (ATIVAN) injection 2 mg  2 mg Intramuscular Q6H PRN    LORazepam (ATIVAN) tablet 1 mg  1 mg Oral Q6H PRN    magnesium hydroxide (MILK OF MAGNESIA) 400 mg/5 mL oral suspension 30 mL  30 mL Oral Daily PRN    OLANZapine (ZyPREXA) IM injection 10 mg  10 mg Intramuscular Q8H PRN    OLANZapine (ZyPREXA) tablet 10 mg  10 mg Oral Q8H PRN    risperiDONE (RisperDAL M-TABS) dispersible tablet 1 mg  1 mg Oral Q3H PRN    sertraline (ZOLOFT) tablet 100 mg  100 mg Oral Daily    traZODone (DESYREL) tablet 50 mg  50 mg Oral HS PRN

## 2020-09-16 NOTE — CASE MANAGEMENT
CM met with Pt who reported that she had spoken with her Khadra Lee, & they identified another couple from [facility], that were willing to let Pt stay with them, until more long term placement/arrangements can be made  Pt said that she knows this couple & they are very kind & they have taken in other transgender youth, so they are knowledgeable & understanding  CM asked if Pt would be able to keep her appointments while staying with them(if they had internet access & if they would drive her to appointments) & Pt said yes  Pt said that she will need transportation at discharge to her father's house to get her belongings & then they will pick her up from there  CM asked if Pt had talked to her father regarding these plans, but she said no  Pt said that she is planning to call him today  Pt said that she is looking forward to her future  Pt talked about going back to school in January & her interest with  services  Pt said that she would like to get a job & CM encouraged her to outreach to her  through [facility]

## 2020-09-16 NOTE — PROGRESS NOTES
09/16/20 1000 09/16/20 1100 09/16/20 1315   Activity/Group Checklist   Group Community meeting  (motivation - bigger picture) Nursing Education  (led by nursing students - warning signs) Self Esteem  (positive affirmations activity)   Attendance Attended Attended Attended   Attendance Duration (min) 31-45 16-30 46-60   Interactions Interacted appropriately Interacted appropriately Interacted appropriately   Affect/Mood Appropriate Appropriate Appropriate   Goals Achieved Able to listen to others; Able to engage in interactions; Able to reflect/comment on own behavior; Identified feelings; Discussed coping strategies Able to engage in interactions; Able to listen to others; Able to reflect/comment on own behavior;Discussed coping strategies; Identified feelings; Identified relapse prevention strategies Identified feelings; Discussed coping strategies; Discussed self-esteem issues; Able to engage in interactions; Able to listen to others; Able to reflect/comment on own behavior   pt remains pleasant and social with peers  He continues to attend therapeutic groups and participate appropriately  Pt engaged frequently and spontaneously in group interactions

## 2020-09-17 PROCEDURE — 99232 SBSQ HOSP IP/OBS MODERATE 35: CPT | Performed by: PHYSICIAN ASSISTANT

## 2020-09-17 PROCEDURE — G0379 DIRECT REFER HOSPITAL OBSERV: HCPCS

## 2020-09-17 RX ADMIN — ARIPIPRAZOLE 5 MG: 5 TABLET ORAL at 08:54

## 2020-09-17 RX ADMIN — SERTRALINE HYDROCHLORIDE 100 MG: 100 TABLET ORAL at 08:53

## 2020-09-17 RX ADMIN — DOCUSATE SODIUM 100 MG: 100 CAPSULE, LIQUID FILLED ORAL at 17:12

## 2020-09-17 RX ADMIN — DOCUSATE SODIUM 100 MG: 100 CAPSULE, LIQUID FILLED ORAL at 08:53

## 2020-09-17 RX ADMIN — FERROUS SULFATE TAB 325 MG (65 MG ELEMENTAL FE) 325 MG: 325 (65 FE) TAB at 08:54

## 2020-09-17 RX ADMIN — FERROUS SULFATE TAB 325 MG (65 MG ELEMENTAL FE) 325 MG: 325 (65 FE) TAB at 17:12

## 2020-09-17 NOTE — PROGRESS NOTES
Pt reported feeling mad yesterday, but didn't tell anyone  Pt reported evening group helped him see what he wants to work after discharge  Denies feeling mad today and reported he will reach out to staff if that changes  Pt reported not being able to get in touch with his father yesterday and will continue to try today  Denies any questions or concerns about treatment or medications

## 2020-09-17 NOTE — CASE MANAGEMENT
AKIN contacted [facility] @ [phone] & left a message on the general appointment voicemail seeking a return call to try to get a good phone number for the [facility] program      CM contacted [facility] @ [phone] & spoke with Lexy Martinez who reported she was away from her desk & asked AKIN to call back in an hour & she would provide a phone number for [facility]

## 2020-09-17 NOTE — CASE MANAGEMENT
CM contacted Radha back & she provided direct off number of [phone] & cell phone number [phone] for Estefania Mendez, the   CM contacted the office number, but reached voicemail  CM called the cell phone number & reached Lupe, who reported that Estefania Mendez is out but would be back on Monday  AKIN reviewed that she was trying to confirm if the referral was received & if there was a waiting list   Lupe said that she would be in the office in the morning & check to see if it was received & call CM back  She said that they have a few intakes, & she wasn't sure how long it may take for Pt to get into the program, if they felt she was appropriate  Lupe agreed to call CM in the morning & CM provided her direct number

## 2020-09-17 NOTE — PROGRESS NOTES
Status: Pt is pleasant & cooperative on the unit  She reported that her anxiety & depression have improved  She was tearful after wrap-up groups, however, was able to process her emotions & after a half hour was fine  Medication: no changes / no PRNs  D/C: Friday / Pt will go to a couple identified by her rabbi    CM has not been able to make contact with anyone at the [facility] program   Pt has follow-up appointment with her psychiatrist & therapist       09/17/20 0830   Team Meeting   Meeting Type Daily Rounds   Team Members Present   Team Members Present Physician;Nurse;;Occupational Therapist   Physician Team Member Dr Bri Ramirez / Judi Cortez Team Member Tracy Guerrero / Ardie Runner Management Team Member Kiara Alas / Percy Harada / Rafita Denton   OT Team Member Phani   Patient/Family Present   Patient Present No   Patient's Family Present No

## 2020-09-17 NOTE — PROGRESS NOTES
09/17/20 0900 09/17/20 1000 09/17/20 1147   Activity/Group Checklist   Group Admission/Discharge  (Relapse prevention plan) Community meeting  (goal setting - problem solving) Nursing Education  (relapse prevention)   Attendance Attended Attended Attended   Attendance Duration (min) 16-30 31-45 16-30   Interactions Interacted appropriately Interacted appropriately Interacted appropriately   Affect/Mood Appropriate Appropriate Appropriate   Goals Achieved Identified triggers; Identified relapse prevention strategies; Discussed safety plan;Discussed coping strategies; Discussed discharge plans; Identified resources and support systems Able to listen to others; Able to engage in interactions; Able to reflect/comment on own behavior;Discussed coping strategies; Identified feelings  --       09/17/20 1315   Activity/Group Checklist   Group Anger management   Attendance Attended   Attendance Duration (min) 46-60   Interactions Interacted appropriately   Affect/Mood Appropriate   Goals Achieved Able to listen to others; Able to engage in interactions; Able to reflect/comment on own behavior;Able to manage/cope with feelings; Discussed coping strategies; Identified feelings; Identified triggers   Pt attended therapeutic groups throughout the day and participated appropriately  He completed the relapse prevention plan and verbalized feeling more hopeful about discharge since he will not be residing with his father  During afternoon group, pt frequently interrupted group to offer various comments then laughed to self  He was responsive to redirection

## 2020-09-17 NOTE — PLAN OF CARE
Problem: Ineffective Coping  Goal: Identifies ineffective coping skills  Outcome: Progressing     Problem: Ineffective Coping  Goal: Demonstrates healthy coping skills  Outcome: Progressing     Problem: Anxiety  Goal: Anxiety is at manageable level  Description: Interventions:  - Assess and monitor patient's anxiety level  - Monitor for signs and symptoms (heart palpitations, chest pain, shortness of breath, headaches, nausea, feeling jumpy, restlessness, irritable, apprehensive)  - Collaborate with interdisciplinary team and initiate plan and interventions as ordered    - [place] patient to unit/surroundings  - Explain treatment plan  - Encourage participation in care  - Encourage verbalization of concerns/fears  - Identify coping mechanisms  - Assist in developing anxiety-reducing skills  - Administer/offer alternative therapies  - Limit or eliminate stimulants  Outcome: Progressing

## 2020-09-17 NOTE — PROGRESS NOTES
Pt appears calm and was cooperative during conversation  Pt reported needing a new mask due to a spill at dinner  Denies SI/HI/AVH  Pt reported having a little anxiety about discharge tomorrow, but finally got in contact with his father and his father was okay with him not returning home  Pt reported that is one less stressor to worry about and is "happy' to be leaving tomorrow  Pt reported having a great time out at groups today and had fun  Pt visible on unit and is social with peers  Pt denies any questions or concerns at this time

## 2020-09-17 NOTE — PROGRESS NOTES
Progress Note - Behavioral Health   Kavitha Lawson 23 y o  adult MRN: [id number]  Unit/Bed#: [facility] 260-02 Encounter: [id number]    Assessment/Plan   Principal Problem:    Depression  Active Problems:    Suicidal ideation    Medical clearance for psychiatric admission    Anemia    Recurrent major depressive disorder (HCC)    Generalized anxiety disorder with panic attacks    PTSD (post-traumatic stress disorder)      Subjective:  Patient reports feeling well at this time  Does report elevated anxiety at times due to thinking about getting her possessions and moving out of her house  Reports depression is more under control and is denying suicidal thoughts  Seen social with her peers and attending groups  Appears to have adequate energy, improved sleep, good appetite, and is more forward thinking  No signs of josafat or agitation  Denied psychosis and delusional material   Medication compliant  Appears to be tolerating medications well without serious side effects        Current Medications:  Current Facility-Administered Medications   Medication Dose Route Frequency    acetaminophen (TYLENOL) tablet 325 mg  325 mg Oral Q6H PRN    acetaminophen (TYLENOL) tablet 650 mg  650 mg Oral Q4H PRN    aluminum-magnesium hydroxide-simethicone (MYLANTA) 200-200-20 mg/5 mL oral suspension 30 mL  30 mL Oral Q4H PRN    ARIPiprazole (ABILIFY) tablet 5 mg  5 mg Oral Daily    benztropine (COGENTIN) injection 1 mg  1 mg Intramuscular Q6H PRN    benztropine (COGENTIN) tablet 1 mg  1 mg Oral Q6H PRN    docusate sodium (COLACE) capsule 100 mg  100 mg Oral BID    ferrous sulfate tablet 325 mg  325 mg Oral BID With Meals    haloperidol (HALDOL) tablet 5 mg  5 mg Oral Q6H PRN    haloperidol lactate (HALDOL) injection 5 mg  5 mg Intramuscular Q6H PRN    hydrOXYzine HCL (ATARAX) tablet 25 mg  25 mg Oral Q6H PRN    hydrOXYzine HCL (ATARAX) tablet 50 mg  50 mg Oral Q6H PRN    ibuprofen (MOTRIN) tablet 600 mg  600 mg Oral Q6H PRN  LORazepam (ATIVAN) injection 2 mg  2 mg Intramuscular Q6H PRN    LORazepam (ATIVAN) tablet 1 mg  1 mg Oral Q6H PRN    magnesium hydroxide (MILK OF MAGNESIA) 400 mg/5 mL oral suspension 30 mL  30 mL Oral Daily PRN    OLANZapine (ZyPREXA) IM injection 10 mg  10 mg Intramuscular Q8H PRN    OLANZapine (ZyPREXA) tablet 10 mg  10 mg Oral Q8H PRN    risperiDONE (RisperDAL M-TABS) dispersible tablet 1 mg  1 mg Oral Q3H PRN    sertraline (ZOLOFT) tablet 100 mg  100 mg Oral Daily    traZODone (DESYREL) tablet 50 mg  50 mg Oral HS PRN       Behavioral Health Medications: all current active meds have been reviewed and continue current psychiatric medications  Vitals:  Vitals:    09/17/20 0800   BP: 104/55   Pulse: 71   Resp: 18   Temp: 97 5 °F (36 4 °C)   SpO2: 98%       Laboratory results:    I have personally reviewed all pertinent laboratory/tests results    Most Recent Labs:   Lab Results   Component Value Date    WBC 6 43 09/11/2020    RBC 3 72 (L) 09/11/2020    HGB 10 2 (L) 09/11/2020    HCT 32 6 (L) 09/11/2020     09/11/2020    RDW 12 3 09/11/2020    NEUTROABS 3 50 09/11/2020    SODIUM 138 09/11/2020    K 3 8 09/11/2020     09/11/2020    CO2 29 09/11/2020    BUN 12 09/11/2020    CREATININE 0 67 09/11/2020    GLUC 83 09/11/2020    GLUF 83 09/11/2020    CALCIUM 8 5 09/11/2020    AST 11 09/11/2020    ALT 12 09/11/2020    ALKPHOS 42 (L) 09/11/2020    TP 6 8 09/11/2020    ALB 3 0 (L) 09/11/2020    TBILI 0 20 09/11/2020    CHOLESTEROL 142 09/11/2020    HDL 51 09/11/2020    TRIG 35 09/11/2020    LDLCALC 84 09/11/2020    [place] 91 09/11/2020    QHY2FOXWIXFN 2 298 09/11/2020    PREGSERUM Negative 09/11/2020    RPR Non-Reactive 09/11/2020       Psychiatric Review of Systems:  Behavior over the last 24 hours:  improved  Sleep: normal  Appetite: normal  Medication side effects: No  ROS: no complaints, all others negative    Mental Status Evaluation:  Appearance:  casually dressed   Behavior:  cooperative Speech:  normal pitch and normal volume   Mood:  less anxious   Affect:  mood-congruent   Language appropriate   Thought Process:  goal directed   Thought Content:  normal   Perceptual Disturbances: None   Risk Potential: Denied SI/HI  Potential for aggression: No   Sensorium:  person, place and time/date   Cognition:  recent and remote memory grossly intact   Consciousness:  alert and awake    Attention: attention span appeared shorter than expected for age   Insight:  partial   Judgment: fair   Gait/Station: normal gait/station and normal balance   Motor Activity: no abnormal movements     Progress Toward Goals: progressing    Recommended Treatment: Continue with group therapy, milieu therapy and occupational therapy  1   Continue current medications  2  Disposition planning with tentative discharge tomorrow    Risks, benefits and possible side effects of Medications:   Risks, benefits, and possible side effects of medications explained to patient and patient verbalizes understanding        Vivienne Hobson PA-C

## 2020-09-18 VITALS
HEART RATE: 74 BPM | WEIGHT: 204.2 LBS | RESPIRATION RATE: 16 BRPM | TEMPERATURE: 97 F | SYSTOLIC BLOOD PRESSURE: 107 MMHG | OXYGEN SATURATION: 98 % | BODY MASS INDEX: 34.02 KG/M2 | HEIGHT: 65 IN | DIASTOLIC BLOOD PRESSURE: 55 MMHG

## 2020-09-18 PROCEDURE — 99239 HOSP IP/OBS DSCHRG MGMT >30: CPT | Performed by: STUDENT IN AN ORGANIZED HEALTH CARE EDUCATION/TRAINING PROGRAM

## 2020-09-18 PROCEDURE — G0379 DIRECT REFER HOSPITAL OBSERV: HCPCS

## 2020-09-18 RX ORDER — DOCUSATE SODIUM 100 MG/1
100 CAPSULE, LIQUID FILLED ORAL 2 TIMES DAILY
Qty: 60 CAPSULE | Refills: 0 | Status: SHIPPED | OUTPATIENT
Start: 2020-09-18 | End: 2021-01-15 | Stop reason: HOSPADM

## 2020-09-18 RX ORDER — SERTRALINE HYDROCHLORIDE 100 MG/1
100 TABLET, FILM COATED ORAL DAILY
Qty: 14 TABLET | Refills: 0 | Status: ON HOLD | OUTPATIENT
Start: 2020-09-19 | End: 2021-01-15 | Stop reason: SDUPTHER

## 2020-09-18 RX ORDER — ARIPIPRAZOLE 5 MG/1
5 TABLET ORAL DAILY
Qty: 14 TABLET | Refills: 0 | Status: ON HOLD | OUTPATIENT
Start: 2020-09-19 | End: 2021-01-15 | Stop reason: SDUPTHER

## 2020-09-18 RX ORDER — FERROUS SULFATE 325(65) MG
325 TABLET ORAL 2 TIMES DAILY WITH MEALS
Qty: 60 TABLET | Refills: 0 | Status: SHIPPED | OUTPATIENT
Start: 2020-09-18 | End: 2021-01-15 | Stop reason: HOSPADM

## 2020-09-18 RX ADMIN — FERROUS SULFATE TAB 325 MG (65 MG ELEMENTAL FE) 325 MG: 325 (65 FE) TAB at 08:49

## 2020-09-18 RX ADMIN — DOCUSATE SODIUM 100 MG: 100 CAPSULE, LIQUID FILLED ORAL at 08:49

## 2020-09-18 RX ADMIN — ARIPIPRAZOLE 5 MG: 5 TABLET ORAL at 08:49

## 2020-09-18 RX ADMIN — SERTRALINE HYDROCHLORIDE 100 MG: 100 TABLET ORAL at 08:49

## 2020-09-18 NOTE — DISCHARGE INSTRUCTIONS
Anxiety   WHAT YOU SHOULD KNOW:   Anxiety is a condition that causes you to feel excessive worry, uneasiness, or fear  Family or work stress, smoking, caffeine, and alcohol can increase your risk for anxiety  Certain medicines or health conditions can also increase your risk  Anxiety may begin gradually, and can become a long-term condition if it is not managed or treated  AFTER YOU LEAVE:   Medicines:   · Medicines  can help you feel more calm and relaxed, and decrease your symptoms  · Take your medicine as directed  Contact your healthcare provider if you think your medicine is not helping or if you have side effects  Tell him if you are allergic to any medicine  Keep a list of the medicines, vitamins, and herbs you take  Include the amounts, and when and why you take them  Bring the list or the pill bottles to follow-up visits  Carry your medicine list with you in case of an emergency  Follow up with your healthcare provider within 2 weeks or as directed:  Write down your questions so you remember to ask them during your visits  Manage anxiety:   · Go to counseling as directed  Cognitive behavioral therapy can help you understand and change how you react to events that trigger your symptoms  · Find ways to manage your symptoms  Activities such as exercise, meditation, or listening to music can help you relax  · Practice deep breathing  Breathing can change how your body reacts to stress  Focus on taking slow, deep breaths several times a day, or during an anxiety attack  Breathe in through your nose, and out through your mouth  · Avoid caffeine  Caffeine can make your symptoms worse  Avoid foods or drinks that are meant to increase your energy level  · Limit or avoid alcohol  Ask your healthcare provider if alcohol is safe for you  You may not be able to drink alcohol if you take certain anxiety or depression medicines  Limit alcohol to 1 drink per day if you are a woman   Limit alcohol to 2 drinks per day if you are a man  A drink of alcohol is 12 ounces of beer, 5 ounces of wine, or 1½ ounces of liquor  Contact your healthcare provider if:   · Your symptoms get worse or do not get better with treatment  · You think your medicine may be causing side effects  · Your anxiety keeps you from doing your regular daily activities  · You have new symptoms since your last visit  · You have questions or concerns about your condition or care  Seek care immediately or call 911 if:   · You have chest pain, tightness, or heaviness that may spread to your shoulders, arms, jaw, neck, or back  · You feel like hurting yourself or someone else  · You feel dizzy, lightheaded, or faint  © 2014 [address] is for End User's use only and may not be sold, redistributed or otherwise used for commercial purposes  All illustrations and images included in CareNotes® are the copyrighted property of A D [employer] M , Inc  or [employer]  The above information is an  only  It is not intended as medical advice for individual conditions or treatments  Talk to your doctor, nurse or pharmacist before following any medical regimen to see if it is safe and effective for you  Depression   WHAT YOU NEED TO KNOW:   Depression is a medical condition that causes feelings of sadness or hopelessness that do not go away  Depression may cause you to lose interest in things you used to enjoy  These feelings may interfere with your daily life  DISCHARGE INSTRUCTIONS:   Call 911 for any of the following:   · You think about harming yourself or someone else  Contact your healthcare provider if:   · Your symptoms do not improve  · You cannot make it to your next appointment  · You have new symptoms  · You have questions or concerns about your condition or care  Medicines:   · Antidepressants  may be given to improve or balance your mood   You may need to take this medicine for several weeks before you begin to feel better  · Take your medicine as directed  Contact your healthcare provider if you think your medicine is not helping or if you have side effects  Tell him of her if you are allergic to any medicine  Keep a list of the medicines, vitamins, and herbs you take  Include the amounts, and when and why you take them  Bring the list or the pill bottles to follow-up visits  Carry your medicine list with you in case of an emergency  Therapy  may be used to treat your depression  A therapist will help you learn to cope with your thoughts and feelings  This can be done alone or in a group  It may also be done with family members or a significant other  Self-care:   · Get regular physical activity  Try to exercise for 30 minutes, 3 to 5 days a week  Work with your healthcare provider to develop an exercise plan that you enjoy  Physical activity may improve your symptoms  · Get enough sleep  Create a routine to help you relax before bed  You can listen to music, read, or do yoga  Try to go to bed and wake up at the same time every day  Sleep is important for emotional health  · Eat a variety of healthy foods from all of the food groups  A healthy meal plan is low in fat, salt, and added sugar  Ask your healthcare provider for more information about a meal plan that is right for you  · Do not drink alcohol or use drugs  Alcohol and drugs can make your symptoms worse  Follow up with your healthcare provider as directed: Your healthcare provider will monitor your progress at follow-up visits  He or she will also monitor your medicine if you take antidepressants  Your healthcare provider will ask if the medicine is helping  Tell him or her about any side effects or problems you may have with your medicine  The type or amount of medicine may need to be changed  Write down your questions so you remember to ask them during your visits    © 2017 [employer] [address] is for End User's use only and may not be sold, redistributed or otherwise used for commercial purposes  All illustrations and images included in CareNotes® are the copyrighted property of A [employer] M , Inc  or [employer]ovanny Mae  The above information is an  only  It is not intended as medical advice for individual conditions or treatments  Talk to your doctor, nurse or pharmacist before following any medical regimen to see if it is safe and effective for you
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
## 2020-09-18 NOTE — CASE MANAGEMENT
CM met with Pt who reported that she was ready for discharge  She said that she wasn't able to find transportation & would need the hospital to assist with this  Pt & CM reviewed her follow-up appointments & the referral made to the [facility] program   Pt did not meet the criteria for an ICM referral at this time, as she has not been in the hospital since 2017, she doesn't miss her appointments, & she takes her medications  Pt had no questions about her discharge plans  Pt reviewed & signed [employer] waiver  CM contacted [facility] to arrange transportation

## 2020-09-18 NOTE — NURSING NOTE
[facility] discharge instructions reviewed with patient  Patient denies any concerns or questions  Expresses readiness for discharge  Patient discharged from the unit pleasant and calm

## 2020-09-18 NOTE — PROGRESS NOTES
Status: Pt denied SI/HI, attends groups & social with peers  Pt stating he is ready for d/c  Medication: no changes / no PRNs  D/C: today / Pt was unsure if she could get her own transportation or not & will let CM know this morning  She has follow-up appointments with her therapist on Mon & Weds & sees her psychiatrist on 9/29  A referral was made to [facility] program, but they are unsure when they may have an opening  Readmit score remains 18(yellow)       09/18/20 2612   Team Meeting   Meeting Type Daily Rounds   Team Members Present   Team Members Present Physician;Nurse;;Occupational Therapist   Physician Team Member Dr Aquiles Monae / Gisele Melo Team Member Michael Aguilar / Adriel Sanz Management Team Member Maria Ines Navas / Ricky Osler / Rachna Mayberry   OT Team Member Phani   Patient/Family Present   Patient Present No   Patient's Family Present No

## 2020-09-18 NOTE — CASE MANAGEMENT
CM met with Pt who said that she felt confident that she was ready for d/c tomorrow & had a plan in place  Pt has not spoken with her father yet, but said that she did leave him a message  Pt said that she is unsure if someone can pick her up & she made need CM to help her get a ride  Pt said that she would be going to [address] [place] [address]  CM reviewed that she was finally able to make contact with the [facility] program, however, they were not in the office & not able to provide much information  CM said that she would include their contact information in Pt's discharge folder, so that she can follow-up, & she agreed

## 2020-09-18 NOTE — PROGRESS NOTES
Pt calm and cooperative, pleasant during interaction  Reports readiness for discharge today  Pt states she feels safe and is happy to be going where she will receive additional support  Denies any questions or concerns regarding meds

## 2020-09-22 NOTE — CASE MANAGEMENT
9/22/2020 @ 10:18 AM: voicemail received for Tiffany at the [facility], who apologized that she was on vacation last week, & received CM's messages upon her return  She confirmed she had received the referral & had been able to schedule an intake with Pt for tomorrow

## 2021-01-05 ENCOUNTER — HOSPITAL ENCOUNTER (EMERGENCY)
Facility: HOSPITAL | Age: 21
End: 2021-01-07
Attending: EMERGENCY MEDICINE
Payer: COMMERCIAL

## 2021-01-05 DIAGNOSIS — R45.851 SUICIDAL IDEATION: Primary | ICD-10-CM

## 2021-01-05 LAB
AMPHETAMINES SERPL QL SCN: NEGATIVE
BARBITURATES UR QL: NEGATIVE
BENZODIAZ UR QL: NEGATIVE
BILIRUB UR QL STRIP: NEGATIVE
CLARITY UR: CLEAR
COCAINE UR QL: NEGATIVE
COLOR UR: YELLOW
ETHANOL EXG-MCNC: 0 MG/DL
EXT PREG TEST URINE: NEGATIVE
EXT. CONTROL ED NAV: NORMAL
FLUAV RNA RESP QL NAA+PROBE: NEGATIVE
FLUBV RNA RESP QL NAA+PROBE: NEGATIVE
GLUCOSE UR STRIP-MCNC: NEGATIVE MG/DL
HGB UR QL STRIP.AUTO: NEGATIVE
KETONES UR STRIP-MCNC: NEGATIVE MG/DL
LEUKOCYTE ESTERASE UR QL STRIP: NEGATIVE
METHADONE UR QL: NEGATIVE
NITRITE UR QL STRIP: NEGATIVE
OPIATES UR QL SCN: NEGATIVE
OXYCODONE+OXYMORPHONE UR QL SCN: NEGATIVE
PCP UR QL: NEGATIVE
PH UR STRIP.AUTO: 7.5 [PH]
PROT UR STRIP-MCNC: NEGATIVE MG/DL
RSV RNA RESP QL NAA+PROBE: NEGATIVE
SARS-COV-2 RNA RESP QL NAA+PROBE: NEGATIVE
SP GR UR STRIP.AUTO: 1.02 (ref 1–1.03)
THC UR QL: NEGATIVE
UROBILINOGEN UR QL STRIP.AUTO: 1 E.U./DL

## 2021-01-05 PROCEDURE — 0241U HB NFCT DS VIR RESP RNA 4 TRGT: CPT | Performed by: EMERGENCY MEDICINE

## 2021-01-05 PROCEDURE — 81025 URINE PREGNANCY TEST: CPT | Performed by: EMERGENCY MEDICINE

## 2021-01-05 PROCEDURE — 82075 ASSAY OF BREATH ETHANOL: CPT | Performed by: EMERGENCY MEDICINE

## 2021-01-05 PROCEDURE — 99285 EMERGENCY DEPT VISIT HI MDM: CPT | Performed by: EMERGENCY MEDICINE

## 2021-01-05 PROCEDURE — 80307 DRUG TEST PRSMV CHEM ANLYZR: CPT | Performed by: EMERGENCY MEDICINE

## 2021-01-05 PROCEDURE — 81003 URINALYSIS AUTO W/O SCOPE: CPT | Performed by: EMERGENCY MEDICINE

## 2021-01-05 PROCEDURE — 99285 EMERGENCY DEPT VISIT HI MDM: CPT

## 2021-01-05 NOTE — ED NOTES
Pt changing into paper scrubs, belonging inventoried    Bra  Underwear   L arm brace  [address]      Pt requesting something to cover head due to [employer] reasons  Pt offered a paper scrub hat, pt accepted        Alondra Larose RN  82/17/49 6778

## 2021-01-05 NOTE — LETTER
[address] [facility] [address]  [address]  [facility] [address]  Dept: [phone]      EMTALA TRANSFER CONSENT    NAME Domingo Velasquez                                         2000                              MRN [id number]    I have been informed of my rights regarding examination, treatment, and transfer   by Dr Jose Carolina, *    Benefits: Patient preference    Risks: Potential for delay in receiving treatment      Consent for Transfer:  I acknowledge that my medical condition has been evaluated and explained to me by the emergency department physician or other qualified medical person and/or my attending physician, who has recommended that I be transferred to the service of  Accepting Physician: Trevor Castrejon at [address] Name, [address] : [place]  The above potential benefits of such transfer, the potential risks associated with such transfer, and the probable risks of not being transferred have been explained to me, and I fully understand them  The doctor has explained that, in my case, the benefits of transfer outweigh the risks  I agree to be transferred  I authorize the performance of emergency medical procedures and treatments upon me in both transit and upon arrival at the receiving facility  Additionally, I authorize the release of any and all medical records to the receiving facility and request they be transported with me, if possible  I understand that the safest mode of transportation during a medical emergency is an ambulance and that the Hospital advocates the use of this mode of transport  Risks of traveling to the receiving facility by car, including absence of medical control, life sustaining equipment, such as oxygen, and medical personnel has been explained to me and I fully understand them  (PERLITA CORRECT BOX BELOW)  [  ]  I consent to the stated transfer and to be transported by ambulance/helicopter    [ ]  I consent to the stated transfer, but refuse transportation by ambulance and accept full responsibility for my transportation by car  I understand the risks of non-ambulance transfers and I exonerate the Hospital and its staff from any deterioration in my condition that results from this refusal     X___________________________________________    DATE  21  TIME________  Signature of patient or legally responsible individual signing on patient behalf           RELATIONSHIP TO PATIENT_________________________          Provider Certification    NAME Alexey Hallman                                         2000                              MRN [id number]    A medical screening exam was performed on the above named patient  Based on the examination:    Condition Necessitating Transfer The encounter diagnosis was Suicidal ideation  Patient Condition: The patient has been stabilized such that within reasonable medical probability, no material deterioration of the patient condition or the condition of the unborn child(neel) is likely to result from the transfer    Reason for Transfer: Level of Care needed not available at this facility    Transfer Requirements: [address] [facility], [address] [place] [address]   · Space available and qualified personnel available for treatment as acknowledged by Isabell Graves [phone]  · Agreed to accept transfer and to provide appropriate medical treatment as acknowledged by       Shakir Miranda  · Appropriate medical records of the examination and treatment of the patient are provided at the time of transfer   [facility], [address] ____JK___  · Transfer will be performed by qualified personnel from [address] [place] [address]  and appropriate transfer equipment as required, including the use of necessary and appropriate life support measures      Provider Certification: I have examined the patient and explained the following risks and benefits of being transferred/refusing transfer to the patient/family:  General risk, such as traffic hazards, adverse weather conditions, rough terrain or turbulence, possible failure of equipment (including vehicle or aircraft), or consequences of actions of persons outside the control of the transport personnel, The patient is stable for psychiatric transfer because they are medically stable, and is protected from harming him/herself or others during transport      Based on these reasonable risks and benefits to the patient and/or the unborn child(neel), and based upon the information available at the time of the patients examination, I certify that the medical benefits reasonably to be expected from the provision of appropriate medical treatments at another medical facility outweigh the increasing risks, if any, to the individuals medical condition, and in the case of labor to the unborn child, from effecting the transfer      X____________________________________________ DATE 01/07/21        TIME_______      ORIGINAL - SEND TO MEDICAL RECORDS   COPY - SEND WITH PATIENT DURING TRANSFER

## 2021-01-05 NOTE — ED PROVIDER NOTES
History  Chief Complaint   Patient presents with    Psychiatric Evaluation     pt reports she is "looking to sign myelf in, I am not in a good state " Reports she was + SI yesterday, but denies today  denies HI/AH/VH  Pt reports having a plan to hang herself from the banister  41-year-old female to male presents to the emergency room for psychiatric evaluation  Patient states that he has had suicidal ideation with plan to hang himself  He reports that over the last few weeks symptoms have gotten worse  Denies any recent suicide attempts but does states that he has tried to commit suicide a few years ago  Reports that the last time he was admitted to a psychiatric facility was in September  He reports that he is compliant with his medications  Denies any alcohol or drug use  No other complaints  History provided by:  Patient  Psychiatric Evaluation  Presenting symptoms: depression and suicidal thoughts    Presenting symptoms: no agitation    Degree of incapacity (severity): Moderate  Onset quality:  Sudden  Timing:  Constant  Progression:  Worsening  Chronicity:  New  Context: not alcohol use, not drug abuse and not noncompliant    Treatment compliance: All of the time  Relieved by:  Nothing  Worsened by:  Nothing  Ineffective treatments:  None tried  Associated symptoms: poor judgment    Associated symptoms: no abdominal pain, no chest pain, no headaches and no irritability    Risk factors: hx of mental illness, hx of suicide attempts and recent psychiatric admission        Prior to Admission Medications   Prescriptions Last Dose Informant Patient Reported? Taking?    ARIPiprazole (ABILIFY) 5 mg tablet   No No   Sig: Take 1 tablet (5 mg total) by mouth daily At 9am   docusate sodium (COLACE) 100 mg capsule   No No   Sig: Take 1 capsule (100 mg total) by mouth 2 (two) times a day At 9am and 6pm   ferrous sulfate 325 (65 Fe) mg tablet   No No   Sig: Take 1 tablet (325 mg total) by mouth 2 (two) times a day with meals   sertraline (ZOLOFT) 100 mg tablet   No No   Sig: Take 1 tablet (100 mg total) by mouth daily At 9am      Facility-Administered Medications: None       Past Medical History:   Diagnosis Date    Anxiety     Depression     History of psychiatric hospitalization     Panic attack     Psychiatric disorder     Suicidal ideations     Suicide attempt ([facility] [address] ) 2016       History reviewed  No pertinent surgical history  History reviewed  No pertinent family history  I have reviewed and agree with the history as documented  E-Cigarette/Vaping    E-Cigarette Use Never User      E-Cigarette/Vaping Substances    Nicotine No     THC No     CBD No     Flavoring No     Other No     Unknown No      Social History     Tobacco Use    Smoking status: Never Smoker    Smokeless tobacco: Never Used   Substance Use Topics    Alcohol use: Yes     Frequency: Never     Binge frequency: Never     Comment: occasional    Drug use: No       Review of Systems   Constitutional: Negative for chills, fever and irritability  HENT: Negative for congestion, ear pain and sore throat  Eyes: Negative for pain and visual disturbance  Respiratory: Negative for cough, shortness of breath and wheezing  Cardiovascular: Negative for chest pain and leg swelling  Gastrointestinal: Negative for abdominal pain, diarrhea, nausea and vomiting  Genitourinary: Negative for dysuria, frequency, hematuria and urgency  Musculoskeletal: Negative for neck pain and neck stiffness  Skin: Negative for rash and wound  Neurological: Negative for weakness, numbness and headaches  Psychiatric/Behavioral: Positive for suicidal ideas  Negative for agitation and confusion  All other systems reviewed and are negative  Physical Exam  Physical Exam  Vitals signs and nursing note reviewed  Constitutional:       Appearance: He is well-developed  HENT:      Head: Normocephalic and atraumatic     Eyes:      Pupils: Pupils are equal, round, and reactive to light  Neck:      Musculoskeletal: Normal range of motion and neck supple  Cardiovascular:      Rate and Rhythm: Normal rate and regular rhythm  Pulmonary:      Effort: Pulmonary effort is normal       Breath sounds: Normal breath sounds  Abdominal:      General: Bowel sounds are normal       Palpations: Abdomen is soft  Musculoskeletal: Normal range of motion  Skin:     General: Skin is warm and dry  Neurological:      General: No focal deficit present  Mental Status: He is alert and oriented to person, place, and time  Comments: No focal deficits   Psychiatric:         Attention and Perception: Attention and perception normal          Mood and Affect: Mood is depressed  Speech: He is noncommunicative  Behavior: Behavior normal  Behavior is cooperative  Thought Content: Thought content includes suicidal ideation  Thought content includes suicidal plan  Cognition and Memory: Cognition and memory normal          Judgment: Judgment is impulsive and inappropriate           Vital Signs  ED Triage Vitals [01/05/21 1547]   Temperature Pulse Respirations Blood Pressure SpO2   98 3 °F (36 8 °C) 71 18 111/56 97 %      Temp Source Heart Rate Source Patient Position - Orthostatic VS BP Location FiO2 (%)   Oral Monitor Sitting Left arm --      Pain Score       --           Vitals:    01/05/21 1547 01/05/21 2027 01/06/21 0754   BP: 111/56 113/65 93/50   Pulse: 71 74 71   Patient Position - Orthostatic VS: Sitting Lying Sitting         Visual Acuity      ED Medications  Medications - No data to display    Diagnostic Studies  Results Reviewed     Procedure Component Value Units Date/Time    Rapid drug screen, urine [[id number]]  (Normal) Collected: 01/05/21 1812    Lab Status: Final result Specimen: Urine Updated: 01/05/21 1835     Amph/Meth UR Negative     Barbiturate Ur Negative     Benzodiazepine Urine Negative     Cocaine Urine Negative Methadone Urine Negative     Opiate Urine Negative     PCP Ur Negative     THC Urine Negative     Oxycodone Urine Negative    Narrative:      FOR MEDICAL PURPOSES ONLY  IF CONFIRMATION NEEDED PLEASE CONTACT THE LAB WITHIN 5 DAYS  Drug Screen Cutoff Levels:  AMPHETAMINE/METHAMPHETAMINES  1000 ng/mL  BARBITURATES     200 ng/mL  BENZODIAZEPINES     200 ng/mL  COCAINE      300 ng/mL  METHADONE      300 ng/mL  OPIATES      300 ng/mL  PHENCYCLIDINE     25 ng/mL  THC       50 ng/mL  OXYCODONE      100 ng/mL    COVID19, Influenza A/B, RSV PCR, [facility] [[id number]]  (Normal) Collected: 01/05/21 1652    Lab Status: Final result Specimen: Nares from Nasopharyngeal Swab Updated: 01/05/21 1747     SARS-CoV-2 Negative     INFLUENZA A PCR Negative     INFLUENZA B PCR Negative     RSV PCR Negative    Narrative: This test has been authorized by FDA under an EUA (Emergency Use Assay) for use by authorized laboratories  Clinical caution and judgement should be used with the interpretation of these results with consideration of the clinical impression and other laboratory testing  Testing reported as "Positive" or "Negative" has been proven to be accurate according to standard laboratory validation requirements  All testing is performed with control materials showing appropriate reactivity at standard intervals      UA w Reflex to Microscopic w Reflex to Culture [[id number]] Collected: 01/05/21 1653    Lab Status: Final result Specimen: Urine, Clean Catch Updated: 01/05/21 1705     Color, UA Yellow     Clarity, UA Clear     Specific [place], UA 1 020     pH, UA 7 5     Leukocytes, UA Negative     Nitrite, UA Negative     Protein, UA Negative mg/dl      Glucose, UA Negative mg/dl      Ketones, UA Negative mg/dl      Urobilinogen, UA 1 0 E U /dl      Bilirubin, UA Negative     Blood, UA Negative    POCT alcohol breath test [[id number]]  (Normal) Resulted: 01/05/21 1655    Lab Status: Final result Updated: 01/05/21 1655 EXTBreath Alcohol 0 000    POCT pregnancy, urine [[id number]]  (Normal) Resulted: 01/05/21 1653    Lab Status: Final result Updated: 01/05/21 1654     EXT PREG TEST UR (Ref: Negative) negative     Control valid                 No orders to display              Procedures  Procedures         ED Course                             SBIRT 20yo+      Most Recent Value   SBIRT (24 yo +)   In order to provide better care to our patients, we are screening all of our patients for alcohol and drug use  Would it be okay to ask you these screening questions? No Filed at: 01/05/2021 1709   Initial Alcohol Screen: US AUDIT-C    1  How often do you have a drink containing alcohol?  0 Filed at: 01/05/2021 1709   2  How many drinks containing alcohol do you have on a typical day you are drinking? 0 Filed at: 01/05/2021 1709   3a  Male UNDER 65: How often do you have five or more drinks on one occasion? 0 Filed at: 01/05/2021 1709   3b  FEMALE Any Age, or MALE 65+: How often do you have 4 or more drinks on one occassion? 0 Filed at: 01/05/2021 1709   Audit-C Score  0 Filed at: 01/05/2021 1709   BRENDA: How many times in the past year have you    Used an illegal drug or used a prescription medication for non-medical reasons? Never Filed at: 01/05/2021 1709                    MDM  Number of Diagnoses or Management Options  Suicidal ideation: new and requires workup  Diagnosis management comments: Patient here for suicidal ideation- will get alcohol,  drug screen, urine preg, COVID, and crisis eval    Patient is medically cleared for psychiatric admission  Case signed out to Dr Amanda Ashby pending crisis eval and placement            Amount and/or Complexity of Data Reviewed  Clinical lab tests: ordered and reviewed  Tests in the radiology section of CPT®: ordered and reviewed  Tests in the medicine section of CPT®: ordered and reviewed  Discussion of test results with the performing providers: yes  Decide to obtain previous medical records or to obtain history from someone other than the patient: yes  Obtain history from someone other than the patient: yes  Review and summarize past medical records: yes  Discuss the patient with other providers: yes  Independent visualization of images, tracings, or specimens: yes    Patient Progress  Patient progress: improved      Disposition  Final diagnoses:   Suicidal ideation     Time reflects when diagnosis was documented in both MDM as applicable and the Disposition within this note     Time User Action Codes Description Comment    1/6/2021 12:21 PM Tivis Castleman A Add [H10 014] Suicidal ideation       ED Disposition     None      Follow-up Information    None         Patient's Medications   Discharge Prescriptions    No medications on file     No discharge procedures on file      PDMP Review     None          ED Provider  Electronically Signed by           Nayla Perez DO  01/06/21 122

## 2021-01-05 NOTE — ED NOTES
Pt father requesting to come back at this time, per pt father not permitted        Page Andre, RN  23/16/13 5645

## 2021-01-05 NOTE — LETTER
Section I - General Information    Name of Patient: Patsy Castro                 : 2000    Medicare #:____________________  Transport Date: 21 (PCS is valid for round trips on this date and for all repetitive trips in the 60-day range as noted below )  Origin: [address]                                                         Destination:___________SLQ 2W_____________________________________  Is the pt's stay covered under Medicare Part A (PPS/DRG)     (_) YES  (X) NO  Closest appropriate facility? (X_) YES  (_) NO  If no, why is transport to more distant facility required?________________________  If hosp-hosp transfer, describe services needed at 2nd facility not available at 1st facility? __IP mental health _________________  If hospice pt, is this transport related to pt's terminal illness? (_) YES (_) NO Describe____________________________________    Section II - Medical Necessity Questionnaire  Ambulance transportation is medically necessary only if other means of transport are contraindicated or would be potentially harmful to the patient  To meet this requirement, the patient must either be "bed confined" or suffer from a condition such that transport by means other than ambulance is contraindicated by the patient's condition   The following questions must be answered by the medical professional signing below for this form to be valid:    1)  Describe the MEDICAL CONDITION (physical and/or mental) of this patient AT [facility] that requires the patient to be transported in an ambulance and why transport by other means is contraindicated by the patient's condition:_______________IP mental health treatment _______________________________________________________  2) Is the patient "bed confined" as defined below?     (_) YES  (X) NO  To be "be confined" the patient must satisfy all three of the following conditions: (1) unable to get up from bed without Assistance; AND (2) unable to ambulate; AND (3) unable to sit in a chair or wheelchair  3) Can this patient safely be transported by car or wheelchair [place] [facility] (i e , seated during transport without a medical attendant or monitoring)?   (_) YES  (X) NO    4) In addition to completing questions 1-3 above, please check any of the following conditions that apply*:  *Note: supporting documentation for any boxes checked must be maintained in the patient's medical records  (_)Contractures   (_)Non-Healed Fractures  (_)Patient is confused (_)Patient is comatose (_)Moderate/severe pain on movement (X)Danger to self/others  (_)IV meds/fluids required (_)Patient is combative(_)Need or possible need for restraints (_)DVT requires elevation of lower extremity  (X)Medical attendant required (_)Requires oxygen-unable to self administer (_)Special handling/isolation/infection control precautions required (_)Unable to tolerate seated position for time needed to transport (_)Hemodynamic monitoring required en route (_)Unable to sit in a chair or wheelchair due to decubitus ulcers or other wounds (_)Cardiac monitoring required en route (_)Morbid obesity requires additional personnel/equipment to safely handle patient (_)Orthopedic device (backboard, halo, pins, traction, brace, wedge, etc,) requiring special handling during transport (_)Other(specify)_______________________________________________    Section III - Signature of Physician or Healthcare Professional    I certify that the above information is accurate based on my evaluation of this patient, and that the medical necessity provisions of 42  40(e)(1) are met, requiring that this patient be transported by ambulance  I understand this information will be used by the Centers for Medicare and Medicaid Services (CMS) to support the determination of medical necessity for ambulance services   I represent that I am the beneficiarys attending physician; or an employee of the beneficiarys attending physician, or the hospital or facility where the beneficiary is being treated and from which the beneficiary is being transported; that I have personal knowledge of the beneficiarys condition at the time of transport; and that I meet all Medicare regulations and applicable State licensure laws for the credential indicated  (_) If this box is checked, I also certify that the patient is physically or mentally incapable of signing the ambulance service's claim and that the institution with which I am affiliated has furnished care, services, or assistance to the patient  My signature below is made on behalf of the patient pursuant to 42 CFR §424 36(b)(4)  In accordance with 42 CFR §424 37, the specific reason(s) that the patient is physically or mentally incapable of signing the claim form is as follows: _________________________________________________________________________________________________________      Signature of Physician* or Healthcare Professional______________________________________________________________  Signature Date 01/07/21 (For scheduled repetitive transports, this form is not valid for transports performed more than 60 days after this date)    Printed Name & Credentials of Physician or Healthcare Professional (MD, DO, RN, etc )_______Cassandra Herrera ________  *Form must be signed by patient's attending physician for scheduled, repetitive transports   For non-repetitive, unscheduled ambulance transports, if unable to obtain the signature of the attending physician, any of the following may sign (choose appropriate option below)  (_) Physician Assistant   (_)  Clinical Nurse Specialist    (_)  Licensed Practical Nurse    (_)    (_)  Nurse Practitioner     (_)  Registered Nurse                (_)                         (X) Discharge Planner

## 2021-01-06 PROCEDURE — 99285 EMERGENCY DEPT VISIT HI MDM: CPT | Performed by: EMERGENCY MEDICINE

## 2021-01-06 NOTE — ED NOTES
CW met with pt who is a 23 yr old female to male who prefers to be called [address]  Pt stated that for the past few weeks he has been having severe depression and anxiety  Pt disclosed suicidal ideations with a plan of hanging or overdose on medication  Pt stated that he has martine having these thoughts for weeks but did not act on them due to the holidays and wanting to care for other people  Pt disclosed that there are many guns in the home that her father owns, which he has access to  Pt denied HI and SI  Pt also denied auditory and visual hallucination and delusions  Pt disclosed that he has been diagnosed with depression and anxiety and has therapy session virtually with therapist Shelby Vinson from [facility]  Pt discloses a diagnosed of Autism  Pt  denies substance use, smoking and legal issues  Pt lives in the home with father who he consents to father having information about her status  Pt is cooperative and positive about the current situation  Pt is requesting treatment and agreed to and  signed a 201  Pt is very open about services  Pt was read the his rights       Graham Mckeon MA, MSW  CW

## 2021-01-06 NOTE — ED NOTES
CW received return call from Maira Mckinney, facility is at capacity and unable to accept pt today      TDS, CW

## 2021-01-06 NOTE — ED NOTES
No in-network beds available; bed search to following facilities:     [facility]: No single rooms available  [place]: Spoke with [facility], no single rooms available  [place]: Spoke with Fidel Lindsay, no single rooms available  [facility]: [facility]oke with [facility], no single rooms available  [facility]: No single rooms available  [facility]: Possible bed available; chart faxed for review  [facility]: Spoke with Brittny Nicholson, no current beds available and won't know discharge beds until after 9a  [place]: Spoke with Fatoumata Melchor, no current or discharge beds available         STEPHANIE Sharma  01/06/21   2157

## 2021-01-06 NOTE — ED NOTES
CW placed call to [address] [place] [address], [phone], spoke sommer/Dutch  At this time there are no available beds for supported care (single room)  If anything changes Lacey Crane will call  [address], [phone]  CW may send clinicals for review   SENT    TDSUNDAR SMITH

## 2021-01-06 NOTE — ED NOTES
CW placed call [facility] to inquire on chart review  CW spoke w/Delmy, as per Jack Riley, unsure about chart referral requested to be sent again  CW advised by Intake no bed available today      TDS, CW

## 2021-01-06 NOTE — ED NOTES
Per EVS, patient has primary with [facility] (verified active per [facility]), and secondary with [facility]  01/06/21   4966

## 2021-01-06 NOTE — ED PROVIDER NOTES
This is a 24-year-old female to male who presents here today with worsening depression and suicidal ideation  He has a plan to hang himself  Placement is pending  The patient has been common cooperative throughout the day  Placement is pending       Demarcus Rene MD  01/06/21 9615

## 2021-01-07 ENCOUNTER — HOSPITAL ENCOUNTER (INPATIENT)
Facility: HOSPITAL | Age: 21
LOS: 8 days | Discharge: HOME | DRG: 885 | End: 2021-01-15
Attending: STUDENT IN AN ORGANIZED HEALTH CARE EDUCATION/TRAINING PROGRAM | Admitting: STUDENT IN AN ORGANIZED HEALTH CARE EDUCATION/TRAINING PROGRAM
Payer: COMMERCIAL

## 2021-01-07 DIAGNOSIS — D50.9 IRON DEFICIENCY ANEMIA, UNSPECIFIED IRON DEFICIENCY ANEMIA TYPE: ICD-10-CM

## 2021-01-07 DIAGNOSIS — D64.9 ANEMIA: ICD-10-CM

## 2021-01-07 DIAGNOSIS — F32.A DEPRESSION: ICD-10-CM

## 2021-01-07 DIAGNOSIS — F43.10 PTSD (POST-TRAUMATIC STRESS DISORDER): Primary | ICD-10-CM

## 2021-01-07 DIAGNOSIS — R45.851 SUICIDAL IDEATION: ICD-10-CM

## 2021-01-07 RX ORDER — TRAZODONE HYDROCHLORIDE 50 MG/1
50 TABLET ORAL
Status: CANCELLED | OUTPATIENT
Start: 2021-01-07

## 2021-01-07 RX ORDER — MAGNESIUM HYDROXIDE/ALUMINUM HYDROXICE/SIMETHICONE 120; 1200; 1200 MG/30ML; MG/30ML; MG/30ML
30 SUSPENSION ORAL EVERY 4 HOURS PRN
Status: CANCELLED | OUTPATIENT
Start: 2021-01-07

## 2021-01-07 RX ORDER — HYDROXYZINE HYDROCHLORIDE 25 MG/1
25 TABLET, FILM COATED ORAL EVERY 6 HOURS PRN
Status: DISCONTINUED | OUTPATIENT
Start: 2021-01-07 | End: 2021-01-15 | Stop reason: HOSPADM

## 2021-01-07 RX ORDER — RISPERIDONE 1 MG/1
1 TABLET, ORALLY DISINTEGRATING ORAL
Status: DISCONTINUED | OUTPATIENT
Start: 2021-01-07 | End: 2021-01-15 | Stop reason: HOSPADM

## 2021-01-07 RX ORDER — RISPERIDONE 1 MG/1
1 TABLET, ORALLY DISINTEGRATING ORAL
Status: CANCELLED | OUTPATIENT
Start: 2021-01-07

## 2021-01-07 RX ORDER — HYDROXYZINE HYDROCHLORIDE 25 MG/1
25 TABLET, FILM COATED ORAL EVERY 6 HOURS PRN
Status: CANCELLED | OUTPATIENT
Start: 2021-01-07

## 2021-01-07 RX ORDER — IBUPROFEN 600 MG/1
600 TABLET ORAL EVERY 6 HOURS PRN
Status: CANCELLED | OUTPATIENT
Start: 2021-01-07

## 2021-01-07 RX ORDER — BENZTROPINE MESYLATE 1 MG/1
1 TABLET ORAL EVERY 6 HOURS PRN
Status: CANCELLED | OUTPATIENT
Start: 2021-01-07

## 2021-01-07 RX ORDER — OLANZAPINE 2.5 MG/1
10 TABLET ORAL EVERY 8 HOURS PRN
Status: CANCELLED | OUTPATIENT
Start: 2021-01-07

## 2021-01-07 RX ORDER — BENZTROPINE MESYLATE 1 MG/ML
1 INJECTION INTRAMUSCULAR; INTRAVENOUS EVERY 6 HOURS PRN
Status: CANCELLED | OUTPATIENT
Start: 2021-01-07

## 2021-01-07 RX ORDER — LORAZEPAM 2 MG/ML
2 INJECTION INTRAMUSCULAR EVERY 6 HOURS PRN
Status: CANCELLED | OUTPATIENT
Start: 2021-01-07

## 2021-01-07 RX ORDER — HALOPERIDOL 5 MG
5 TABLET ORAL EVERY 6 HOURS PRN
Status: CANCELLED | OUTPATIENT
Start: 2021-01-07

## 2021-01-07 RX ORDER — IBUPROFEN 600 MG/1
600 TABLET ORAL EVERY 6 HOURS PRN
Status: DISCONTINUED | OUTPATIENT
Start: 2021-01-07 | End: 2021-01-15 | Stop reason: HOSPADM

## 2021-01-07 RX ORDER — BENZTROPINE MESYLATE 1 MG/1
1 TABLET ORAL EVERY 6 HOURS PRN
Status: DISCONTINUED | OUTPATIENT
Start: 2021-01-07 | End: 2021-01-15 | Stop reason: HOSPADM

## 2021-01-07 RX ORDER — LORAZEPAM 2 MG/ML
2 INJECTION INTRAMUSCULAR EVERY 6 HOURS PRN
Status: DISCONTINUED | OUTPATIENT
Start: 2021-01-07 | End: 2021-01-15 | Stop reason: HOSPADM

## 2021-01-07 RX ORDER — OLANZAPINE 10 MG/1
10 INJECTION, POWDER, LYOPHILIZED, FOR SOLUTION INTRAMUSCULAR EVERY 8 HOURS PRN
Status: DISCONTINUED | OUTPATIENT
Start: 2021-01-07 | End: 2021-01-15 | Stop reason: HOSPADM

## 2021-01-07 RX ORDER — OLANZAPINE 10 MG/1
10 TABLET ORAL EVERY 8 HOURS PRN
Status: DISCONTINUED | OUTPATIENT
Start: 2021-01-07 | End: 2021-01-15 | Stop reason: HOSPADM

## 2021-01-07 RX ORDER — HALOPERIDOL 5 MG
5 TABLET ORAL EVERY 6 HOURS PRN
Status: DISCONTINUED | OUTPATIENT
Start: 2021-01-07 | End: 2021-01-15 | Stop reason: HOSPADM

## 2021-01-07 RX ORDER — HALOPERIDOL 5 MG/ML
5 INJECTION INTRAMUSCULAR EVERY 6 HOURS PRN
Status: DISCONTINUED | OUTPATIENT
Start: 2021-01-07 | End: 2021-01-15 | Stop reason: HOSPADM

## 2021-01-07 RX ORDER — TRAZODONE HYDROCHLORIDE 50 MG/1
50 TABLET ORAL
Status: DISCONTINUED | OUTPATIENT
Start: 2021-01-07 | End: 2021-01-08

## 2021-01-07 RX ORDER — ACETAMINOPHEN 325 MG/1
325 TABLET ORAL EVERY 6 HOURS PRN
Status: DISCONTINUED | OUTPATIENT
Start: 2021-01-07 | End: 2021-01-15 | Stop reason: HOSPADM

## 2021-01-07 RX ORDER — HYDROXYZINE HYDROCHLORIDE 25 MG/1
50 TABLET, FILM COATED ORAL EVERY 6 HOURS PRN
Status: CANCELLED | OUTPATIENT
Start: 2021-01-07

## 2021-01-07 RX ORDER — OLANZAPINE 10 MG/1
10 INJECTION, POWDER, LYOPHILIZED, FOR SOLUTION INTRAMUSCULAR EVERY 8 HOURS PRN
Status: CANCELLED | OUTPATIENT
Start: 2021-01-07

## 2021-01-07 RX ORDER — HALOPERIDOL 5 MG/ML
5 INJECTION INTRAMUSCULAR EVERY 6 HOURS PRN
Status: CANCELLED | OUTPATIENT
Start: 2021-01-07

## 2021-01-07 RX ORDER — HYDROXYZINE 50 MG/1
50 TABLET, FILM COATED ORAL EVERY 6 HOURS PRN
Status: DISCONTINUED | OUTPATIENT
Start: 2021-01-07 | End: 2021-01-15 | Stop reason: HOSPADM

## 2021-01-07 RX ORDER — MAGNESIUM HYDROXIDE/ALUMINUM HYDROXICE/SIMETHICONE 120; 1200; 1200 MG/30ML; MG/30ML; MG/30ML
30 SUSPENSION ORAL EVERY 4 HOURS PRN
Status: DISCONTINUED | OUTPATIENT
Start: 2021-01-07 | End: 2021-01-15 | Stop reason: HOSPADM

## 2021-01-07 RX ORDER — ACETAMINOPHEN 325 MG/1
325 TABLET ORAL EVERY 6 HOURS PRN
Status: CANCELLED | OUTPATIENT
Start: 2021-01-07

## 2021-01-07 RX ORDER — LORAZEPAM 1 MG/1
1 TABLET ORAL EVERY 6 HOURS PRN
Status: DISCONTINUED | OUTPATIENT
Start: 2021-01-07 | End: 2021-01-15 | Stop reason: HOSPADM

## 2021-01-07 RX ORDER — BENZTROPINE MESYLATE 1 MG/ML
1 INJECTION INTRAMUSCULAR; INTRAVENOUS EVERY 6 HOURS PRN
Status: DISCONTINUED | OUTPATIENT
Start: 2021-01-07 | End: 2021-01-15 | Stop reason: HOSPADM

## 2021-01-07 RX ORDER — ACETAMINOPHEN 325 MG/1
650 TABLET ORAL EVERY 4 HOURS PRN
Status: CANCELLED | OUTPATIENT
Start: 2021-01-07

## 2021-01-07 RX ORDER — ACETAMINOPHEN 325 MG/1
650 TABLET ORAL EVERY 4 HOURS PRN
Status: DISCONTINUED | OUTPATIENT
Start: 2021-01-07 | End: 2021-01-15 | Stop reason: HOSPADM

## 2021-01-07 RX ORDER — LORAZEPAM 1 MG/1
1 TABLET ORAL EVERY 6 HOURS PRN
Status: CANCELLED | OUTPATIENT
Start: 2021-01-07

## 2021-01-07 NOTE — PLAN OF CARE
Problem: Ineffective Coping  Goal: Cooperates with admission process  Description: Interventions:   - Complete admission process  Outcome: Completed  Goal: Identifies ineffective coping skills  Outcome: Completed  Goal: Identifies healthy coping skills  Outcome: Progressing  Goal: Demonstrates healthy coping skills  Outcome: Progressing  Goal: Participates in unit activities  Description: Interventions:  - Provide therapeutic environment   - Provide required programming   - Redirect inappropriate behaviors   Outcome: Progressing  Goal: Patient/Family participate in treatment and DC plans  Description: Interventions:  - Provide therapeutic environment  Outcome: Progressing  Goal: Patient/Family verbalizes awareness of resources  Outcome: Progressing  Goal: Understands least restrictive measures  Description: Interventions:  - Utilize least restrictive behavior  Outcome: Completed  Goal: Free from restraint events  Description: - Utilize least restrictive measures   - Provide behavioral interventions   - Redirect inappropriate behaviors   Outcome: Completed

## 2021-01-07 NOTE — ED NOTES
Per policy vitals will be obtained when pt wakes [facility]  01/07/21 0806

## 2021-01-07 NOTE — PLAN OF CARE
Problem: Ineffective Coping  Goal: Cooperates with admission process  Description: Interventions:   - Complete admission process  Outcome: Progressing     Problem: Ineffective Coping  Goal: Understands least restrictive measures  Description: Interventions:  - Utilize least restrictive behavior  Outcome: Progressing     Problem: Ineffective Coping  Goal: Participates in unit activities  Description: Interventions:  - Provide therapeutic environment   - Provide required programming   - Redirect inappropriate behaviors   Outcome: Progressing

## 2021-01-07 NOTE — PLAN OF CARE
Problem: Ineffective Coping  Goal: Cooperates with admission process  Description: Interventions:   - Complete admission process  Outcome: Completed  Goal: Identifies ineffective coping skills  Outcome: Completed  Goal: Understands least restrictive measures  Description: Interventions:  - Utilize least restrictive behavior  Outcome: Completed  Goal: Free from restraint events  Description: - Utilize least restrictive measures   - Provide behavioral interventions   - Redirect inappropriate behaviors   Outcome: Completed     Problem: Depression  Goal: Refrain from isolation  Description: Interventions:  - Develop a trusting relationship   - Encourage socialization   Outcome: Completed

## 2021-01-07 NOTE — PROGRESS NOTES
Pt is a 11 from [facility] ED arriving with SI-plan to overdose or hang self  Pt verbally contracts for safety  Reports he was compliant with medications however feels anxiety was becoming overwhelming and began to have SI thoughts  Pt denies HI, AVH  Hx of anemia  Reports recently injuring right wrist and was told it was sprained  Pt wearing brace to right wrist  Denies hx of seizures or recent falls  Denies hx of drug use  Pt reports drinking about 1-2 alcoholic drinks once every two weeks  Non-smoker  Pt reports living by self  Supportive parents  Patient denies any direct access to guns however reports her Father does have guns and pt could possibly gain access of these if needed  At this time, pt has not signed SARI to contact Father

## 2021-01-07 NOTE — ED NOTES
Patient is accepted at [address]  Patient is accepted by Julius Th[facility]odeaux   Transportation is arranged with [address] is scheduled for 1300  Patient may go to the floor after 1300        Nurse report is to be called to [phone] prior to patient transfer

## 2021-01-07 NOTE — ED NOTES
Insurance Authorization for admission:   Phone call placed to [facility]  Phone number:    [phone]  Spoke to Teri Peres  14 days approved  Level of care: 201  Review on  1/20/2021  Authorization # [id number]      EVS (Eligibility Verification System) called - [phone]    Automated system indicates: eligible for [address] for Transportation:    Not needed for CTS transfer    JK-CIS

## 2021-01-07 NOTE — ED CARE HANDOFF
Emergency Department Sign Out Note        Sign out and transfer of care from [place]  See Separate Emergency Department note  The patient, Gilbert Dominique, was evaluated by the previous provider for SI, wants to hang himself  Workup Completed:  Psychiatric evaluation    ED Course / Workup Pending (followup): Mady Edouard is a 22 y/o female to male with SI, with plans to hang himself  He signed himself in on a 201 and is pending placement  Procedures  MDM  Number of Diagnoses or Management Options  Suicidal ideation:   Diagnosis management comments: 22 y/o female to male, signed 12 for SI, pending placement         Disposition  Final diagnoses:   Suicidal ideation     Time reflects when diagnosis was documented in both MDM as applicable and the Disposition within this note     Time User Action Codes Description Comment    1/6/2021 12:21 PM Paulino Ross Add [X90 [id number]] Suicidal ideation       ED Disposition     ED Disposition Condition Date/Time Comment    Transfer to Another Facility  Th Jan 7, 2021  1:31 PM Gilbert Dominique should be transferred out to [facility]      MD Documentation      Most Recent Value   Patient Condition  The patient has been stabilized such that within reasonable medical probability, no material deterioration of the patient condition or the condition of the unborn child(neel) is likely to result from the transfer   Reason for Transfer  Level of Care needed not available at this facility   Benefits of Transfer  Patient preference   Risks of Transfer  Potential for delay in receiving treatment   Accepting Physician  [address] Name, Vahe Bess [place]    (Name & Tel number)  Cathie Li [phone]   Transported by Phil Claros and Unit #)  CTS   Sending MD Lester Sharp   Provider Certification  General risk, such as traffic hazards, adverse weather conditions, rough terrain or turbulence, possible failure of equipment (including vehicle or aircraft), or consequences of actions of persons outside the control of the transport personnel, The patient is stable for psychiatric transfer because they are medically stable, and is protected from harming him/herself or others during transport      RN Documentation      [address] Name, Vahe Bess [place]    (Name & Tel number)  Yon Parker [phone]   Transported by [employer] and Unit #)  CTS   Transfer Date  01/07/21   Transfer Time  1300      Follow-up Information    None       Discharge Medication List as of 1/7/2021  1:31 PM      CONTINUE these medications which have NOT CHANGED    Details   ARIPiprazole (ABILIFY) 5 mg tablet Take 1 tablet (5 mg total) by mouth daily At 9am, Starting Sat 9/19/2020, Print      docusate sodium (COLACE) 100 mg capsule Take 1 capsule (100 mg total) by mouth 2 (two) times a day At 9am and 6pm, Starting Fri 9/18/2020, Print      ferrous sulfate 325 (65 Fe) mg tablet Take 1 tablet (325 mg total) by mouth 2 (two) times a day with meals, Starting Fri 9/18/2020, Print      sertraline (ZOLOFT) 100 mg tablet Take 1 tablet (100 mg total) by mouth daily At 9am, Starting Sat 9/19/2020, Print           No discharge procedures on file         ED Provider  Electronically Signed by     Evangelina Ge DO  01/11/21 3820

## 2021-01-08 PROBLEM — D50.9 IRON DEFICIENCY ANEMIA: Status: ACTIVE | Noted: 2021-01-08

## 2021-01-08 LAB
ALBUMIN SERPL BCP-MCNC: 3.1 G/DL (ref 3.5–5)
ALP SERPL-CCNC: 45 U/L (ref 46–116)
ALT SERPL W P-5'-P-CCNC: 15 U/L (ref 12–78)
ANION GAP SERPL CALCULATED.3IONS-SCNC: 3 MMOL/L (ref 4–13)
AST SERPL W P-5'-P-CCNC: 10 U/L (ref 5–45)
BASOPHILS # BLD AUTO: 0.04 THOUSANDS/ΜL (ref 0–0.1)
BASOPHILS NFR BLD AUTO: 1 % (ref 0–1)
BILIRUB SERPL-MCNC: 0.36 MG/DL (ref 0.2–1)
BUN SERPL-MCNC: 13 MG/DL (ref 5–25)
CALCIUM ALBUM COR SERPL-MCNC: 9.7 MG/DL (ref 8.3–10.1)
CALCIUM SERPL-MCNC: 9 MG/DL (ref 8.3–10.1)
CHLORIDE SERPL-SCNC: 109 MMOL/L (ref 100–108)
CO2 SERPL-SCNC: 28 MMOL/L (ref 21–32)
CREAT SERPL-MCNC: 0.63 MG/DL (ref 0.6–1.3)
EOSINOPHIL # BLD AUTO: 0.13 THOUSAND/ΜL (ref 0–0.61)
EOSINOPHIL NFR BLD AUTO: 2 % (ref 0–6)
ERYTHROCYTE [DISTWIDTH] IN BLOOD BY AUTOMATED COUNT: 12.6 % (ref 11.6–15.1)
EST. AVERAGE GLUCOSE BLD GHB EST-MCNC: 94 MG/DL
GLUCOSE SERPL-MCNC: 74 MG/DL (ref 65–140)
HBA1C MFR BLD: 4.9 %
HCG SERPL QL: NEGATIVE
HCT VFR BLD AUTO: 33.9 % (ref 36.5–46.1)
HGB BLD-MCNC: 10.4 G/DL (ref 12–15.4)
IMM GRANULOCYTES # BLD AUTO: 0.01 THOUSAND/UL (ref 0–0.2)
IMM GRANULOCYTES NFR BLD AUTO: 0 % (ref 0–2)
LYMPHOCYTES # BLD AUTO: 2.82 THOUSANDS/ΜL (ref 0.6–4.47)
LYMPHOCYTES NFR BLD AUTO: 35 % (ref 14–44)
MCH RBC QN AUTO: 27.2 PG (ref 26.8–34.3)
MCHC RBC AUTO-ENTMCNC: 30.7 G/DL (ref 31.4–37.4)
MCV RBC AUTO: 89 FL (ref 82–98)
MONOCYTES # BLD AUTO: 0.69 THOUSAND/ΜL (ref 0.17–1.22)
MONOCYTES NFR BLD AUTO: 9 % (ref 4–12)
NEUTROPHILS # BLD AUTO: 4.37 THOUSANDS/ΜL (ref 1.85–7.62)
NEUTS SEG NFR BLD AUTO: 53 % (ref 43–75)
NRBC BLD AUTO-RTO: 0 /100 WBCS
PLATELET # BLD AUTO: 234 THOUSANDS/UL (ref 149–390)
PMV BLD AUTO: 12.1 FL (ref 8.9–12.7)
POTASSIUM SERPL-SCNC: 3.7 MMOL/L (ref 3.5–5.3)
PROT SERPL-MCNC: 6.7 G/DL (ref 6.4–8.2)
RBC # BLD AUTO: 3.83 MILLION/UL (ref 3.88–5.12)
RPR SER QL: NORMAL
SODIUM SERPL-SCNC: 140 MMOL/L (ref 136–145)
TSH SERPL DL<=0.05 MIU/L-ACNC: 1.49 UIU/ML (ref 0.36–3.74)
WBC # BLD AUTO: 8.06 THOUSAND/UL (ref 4.31–10.16)

## 2021-01-08 PROCEDURE — 80053 COMPREHEN METABOLIC PANEL: CPT | Performed by: PHYSICIAN ASSISTANT

## 2021-01-08 PROCEDURE — 84443 ASSAY THYROID STIM HORMONE: CPT | Performed by: PHYSICIAN ASSISTANT

## 2021-01-08 PROCEDURE — 86592 SYPHILIS TEST NON-TREP QUAL: CPT | Performed by: PHYSICIAN ASSISTANT

## 2021-01-08 PROCEDURE — 84703 CHORIONIC GONADOTROPIN ASSAY: CPT | Performed by: PHYSICIAN ASSISTANT

## 2021-01-08 PROCEDURE — 99223 1ST HOSP IP/OBS HIGH 75: CPT | Performed by: STUDENT IN AN ORGANIZED HEALTH CARE EDUCATION/TRAINING PROGRAM

## 2021-01-08 PROCEDURE — 85025 COMPLETE CBC W/AUTO DIFF WBC: CPT | Performed by: PHYSICIAN ASSISTANT

## 2021-01-08 PROCEDURE — 99232 SBSQ HOSP IP/OBS MODERATE 35: CPT | Performed by: PHYSICIAN ASSISTANT

## 2021-01-08 PROCEDURE — 83036 HEMOGLOBIN GLYCOSYLATED A1C: CPT | Performed by: PHYSICIAN ASSISTANT

## 2021-01-08 RX ORDER — SERTRALINE HYDROCHLORIDE 100 MG/1
100 TABLET, FILM COATED ORAL DAILY
Status: DISCONTINUED | OUTPATIENT
Start: 2021-01-08 | End: 2021-01-15 | Stop reason: HOSPADM

## 2021-01-08 RX ORDER — ARIPIPRAZOLE 5 MG/1
5 TABLET ORAL DAILY
Status: DISCONTINUED | OUTPATIENT
Start: 2021-01-08 | End: 2021-01-15 | Stop reason: HOSPADM

## 2021-01-08 RX ORDER — PRAZOSIN HYDROCHLORIDE 1 MG/1
1 CAPSULE ORAL DAILY
Status: DISCONTINUED | OUTPATIENT
Start: 2021-01-08 | End: 2021-01-10

## 2021-01-08 RX ORDER — FERROUS SULFATE 325(65) MG
325 TABLET ORAL 2 TIMES DAILY WITH MEALS
Status: DISCONTINUED | OUTPATIENT
Start: 2021-01-08 | End: 2021-01-12

## 2021-01-08 RX ORDER — TRAZODONE HYDROCHLORIDE 50 MG/1
50 TABLET ORAL
Status: DISCONTINUED | OUTPATIENT
Start: 2021-01-08 | End: 2021-01-08

## 2021-01-08 RX ORDER — LANOLIN ALCOHOL/MO/W.PET/CERES
6 CREAM (GRAM) TOPICAL
Status: DISCONTINUED | OUTPATIENT
Start: 2021-01-08 | End: 2021-01-11

## 2021-01-08 RX ADMIN — ARIPIPRAZOLE 5 MG: 5 TABLET ORAL at 12:19

## 2021-01-08 RX ADMIN — ALUMINUM HYDROXIDE, MAGNESIUM HYDROXIDE, AND SIMETHICONE 30 ML: 200; 200; 20 SUSPENSION ORAL at 21:16

## 2021-01-08 RX ADMIN — FERROUS SULFATE TAB 325 MG (65 MG ELEMENTAL FE) 325 MG: 325 (65 FE) TAB at 17:26

## 2021-01-08 RX ADMIN — PRAZOSIN HYDROCHLORIDE 1 MG: 1 CAPSULE ORAL at 12:19

## 2021-01-08 RX ADMIN — FERROUS SULFATE TAB 325 MG (65 MG ELEMENTAL FE) 325 MG: 325 (65 FE) TAB at 09:09

## 2021-01-08 RX ADMIN — SERTRALINE HYDROCHLORIDE 100 MG: 100 TABLET ORAL at 12:19

## 2021-01-08 RX ADMIN — MELATONIN TAB 3 MG 6 MG: 3 TAB at 21:13

## 2021-01-08 NOTE — PROGRESS NOTES
Status: Pt is a 60 51 80 from [place], reporting SI to hang self & reported that his father has multiple guns which he has acces to at home  Upon arrival to the unit, Pt denied any SI/HI & is social & comfortable on the unit  Pt had c/o of right wrist pain & is wearing a brace    Reviewed readmit score: 21(yellow)  Medication: to be reviewed / no PRNs  D/C: TBD / new admission     01/08/21 [address] Type Daily Rounds   Team Members Present   Team Members Present Physician;Nurse;Occupational Therapist;   Physician Team Member Dr April Dhaliwal / Dante Schaumann Team Member [facility] Management Team Member Jade Rangel / Akanksha Bowser   OT Team Member Harrison Miranda / Bethanie Mask   Patient/Family Present   Patient Present No   Patient's Family Present No

## 2021-01-08 NOTE — CASE MANAGEMENT
CM contacted [facility] @ [phone] & left a message for  Morgan Jeffries, to notify her of Pt's admission & request a return call  CM contacted [facility] @ [phone] & left a message to notify the practice of Pt's admission & request a return call to schedule follow-up  CM contacted [address] [place] [address] @ [phone] & left a message to inform the practice of Pt's admission & request a return call to schedule follow-up with therapist Marco Gilliam  CM contacted Pt's PCP, Dr Michelle Kingston, @ [phone] & spoke [facility] who confirmed Pt's next appointment is 4/21 @ 10:20 AM (in person)  AKIN confirmed fax number [phone] to send discharge information

## 2021-01-08 NOTE — CMS CERTIFICATION NOTE
Recertification: Based upon physical, mental and social evaluations, I certify that inpatient psychiatric services continue to be medically necessary for this patient for a duration of 7 midnights for the treatment of  Recurrent major depressive disorder ([facility] [address] )   Available alternative community resources still do not meet the patient's mental health care needs  I further attest that an established written individualized plan of care has been updated and is outlined in the patient's medical records

## 2021-01-08 NOTE — PROGRESS NOTES
Scant conversation  Withdrawn to self/room early in the shift, more visible after groups  Reports sleeping well at night, but feels a medication may be causing the fatigue  At the time of interaction, Pt had only received iron supplement  Does currently report SI, verbally contracts for safety  Denies HI/AVH    Compliant with medications

## 2021-01-08 NOTE — PROGRESS NOTES
Pt is in the dayroom with peers watching t v   Pt is pleasant and cooperative during conversation  Pt denies SI/HI/AVH and reports feeling very tired due to interrupted sleep overnight  Pt denies having any other concerns or questions

## 2021-01-08 NOTE — PLAN OF CARE
Problem: Depression  Goal: Treatment Goal: Demonstrate behavioral control of depressive symptoms, verbalize feelings of improved mood/affect, and adopt new coping skills prior to discharge  Outcome: Progressing  Goal: Verbalize thoughts and feelings  Description: Interventions:  - Assess and re-assess patient's level of risk   - Engage patient in 1:1 interactions, daily, for a minimum of 15 minutes   - Encourage patient to express feelings, fears, frustrations, hopes   Outcome: Progressing  Goal: Refrain from harming self  Description: Interventions:  - Monitor patient closely, per order   - Supervise medication ingestion, monitor effects and side effects   Outcome: Progressing  Goal: Refrain from self-neglect  Outcome: Progressing  Goal: Attend and participate in unit activities, including therapeutic, recreational, and educational groups  Description: Interventions:  - Provide therapeutic and educational activities daily, encourage attendance and participation, and document same in the medical record   Outcome: Progressing  Goal: Complete daily ADLs, including personal hygiene independently, as able  Description: Interventions:  - Observe, teach, and assist patient with ADLS  -  Monitor and promote a balance of rest/activity, with adequate nutrition and elimination   Outcome: Progressing     Problem: Anxiety  Goal: Anxiety is at manageable level  Description: Interventions:  - Assess and monitor patient's anxiety level  - Monitor for signs and symptoms (heart palpitations, chest pain, shortness of breath, headaches, nausea, feeling jumpy, restlessness, irritable, apprehensive)  - Collaborate with interdisciplinary team and initiate plan and interventions as ordered    - Okay patient to unit/surroundings  - Explain treatment plan  - Encourage participation in care  - Encourage verbalization of concerns/fears  - Identify coping mechanisms  - Assist in developing anxiety-reducing skills  - Administer/offer alternative therapies  - Limit or eliminate stimulants  Outcome: Progressing
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
## 2021-01-08 NOTE — PROGRESS NOTES
01/08/21 1000 01/08/21 1100 01/08/21 1315   Activity/Group Checklist   Group Community meeting  (goal setting - motivation) Wellness  (healthy habits bingo) Life Skills  (consistency in recovery)   Attendance Attended Attended Attended   Attendance Duration (min) 0-15  (abruptly left group shortly after arriving) 16-30 46-60   Interactions Did not interact Interacted appropriately Other (Comment)  (minimal interaction only if prompted)   Affect/Mood Calm Appropriate Constricted  (flat affect, limited eye contact)   Goals Achieved Able to listen to others Identified feelings; Able to listen to others; Able to engage in interactions Able to listen to others; Able to engage in interactions; Identified feelings; Discussed coping strategies   Pt attended 3/3 therapeutic groups today  He presented as calm and cooperative during interaction  Pt engaged minimally in group discussion if prompted but completed presented activities  Following life skills group, pt stood for several minutes waiting to speak with this writer  He verbalized feeling "light headed" and began quickly opening and closing his eyes  Pt assisted to sit in nearby chair and nursing staff notified to assess for further intervention

## 2021-01-08 NOTE — TREATMENT PLAN
TREATMENT PLAN REVIEW - [address] 20 y o  2000 adult MRN: [id number]    [address] [place] [address] Room / Bed: Kennedy Deluca/[facility] 446-01 Encounter: [id number]          Admit Date/Time:  1/7/2021  2:30 PM    Treatment Team: Attending Provider: Farheen Scott MD; Patient Care Assistant: Dayami Huber; : Toro Wilkinson; Patient Care Assistant: Kamala Diaz; Patient Care Technician: Sanjuana Pelaez; Patient Care Technician: Laurel Marin; Medications RN: Lida Orlando, ALFREDO; Nursing Student: Tedi Prader; Care Manager: Inez Borges RN; Registered Nurse: Ronda Bryant RN; Registered Nurse: Frederick Rollins RN; Occupational Therapy Assistant: BAILEE Ortiz; Charge Nurse: Virgil Navarro RN; : Martine Bull; Patient Care Technician: Shade Kaplan    Diagnosis: Principal Problem:    Recurrent major depressive disorder [facility]  Active Problems:    Medical clearance for psychiatric admission    Generalized anxiety disorder with panic attacks    PTSD (post-traumatic stress disorder)    Iron deficiency anemia      Patient Strengths/Assets: good insight, good past treatment response, good physical health, motivation for treatment/growth, patient is on a voluntary commitment    Patient Barriers/Limitations: limited support system, low self esteem    Short Term Goals: decrease in depressive symptoms, decrease in anxiety symptoms, decrease in suicidal thoughts    Long Term Goals: improvement in depression, improvement in anxiety, resolution of depressive symptoms, stabilization of mood, free of suicidal thoughts    Progress Towards Goals: starting psychiatric medications as prescribed    Recommended Treatment: medication management, patient medication education, group therapy, milieu therapy, continued Behavioral Health psychiatric evaluation/assessment process    Treatment Frequency: daily medication monitoring, group and milieu therapy daily, monitoring through interdisciplinary rounds, monitoring through weekly patient care conferences    Expected Discharge Date:  5-7 days    Discharge Plan: referral for outpatient medication management with a psychiatrist, referral for outpatient psychotherapy    Treatment Plan Created/Updated By: Maggie Alvarenga MD

## 2021-01-08 NOTE — CONSULTS
Consult- Dutch Varela 2000, 23 y o  adult MRN: [id number]    Unit/Bed#: [facility] 260-02 Encounter: [id number]    Primary Care Provider: Abeba Barbour MD   Date and time admitted to hospital: 1/7/2021  2:30 PM    Inpatient consult for Medical Clearance for [facility] patient  Consult performed by: Audra Abad PA-C  Consult ordered by: Linda Eaton PA-C        Medical clearance for psychiatric admission  Assessment & Plan  · Vital signs stable at time of assessment  · CBC, CMP, TSH pending  · Patient appears medically stable at this time for inpatient psychiatric treatment    Iron deficiency anemia  Assessment & Plan  · Hgb 10 2 as of Sept 2020  · Repeat CBC pending  · Continue oral iron supplementation    * Recurrent major depressive disorder ([facility] )  Assessment & Plan  · Presented to the ED with SI  · Further plan per psychiatry      VTE Prophylaxis: Reason for no pharmacologic prophylaxis low risk, ambulate  / reason for no mechanical VTE prophylaxis psychiatric unit, ambulate     Recommendations for Discharge:  · Follow up with PCP after discharge    Counseling / Coordination of Care Time: 20 minutes  Greater than 50% of total time spent on patient counseling and coordination of care  Collaboration of Care: Were Recommendations Directly Discussed with Primary Treatment Team? - No     History of Present Illness:    Dutch Varela is a 23 y o  adult who is originally admitted to the psychiatry service due to [address]  We are consulted for medical clearance  Past medical history significant for anxiety/depression, anemia  Patient presented to the ED due to reported SI  Currently denies any physical complaints including chest pain/palpitations, shortness of breath, nausea/vomiting, abdominal pain  Does have brace to right wrist due to recent sprain  Reports pain is controlled  Denies swelling, numbness/tingling      Review of Systems:    Review of Systems   Constitutional: Negative for chills, fatigue, fever and unexpected weight change  HENT: Negative for congestion, sore throat and trouble swallowing  Eyes: Negative for photophobia, pain and visual disturbance  Respiratory: Negative for cough, shortness of breath and wheezing  Cardiovascular: Negative for chest pain, palpitations and leg swelling  Gastrointestinal: Negative for abdominal pain, constipation, diarrhea, nausea and vomiting  Endocrine: Negative for polyuria  Genitourinary: Negative for difficulty urinating, dysuria, flank pain, hematuria and urgency  Musculoskeletal: Negative for back pain, myalgias, neck pain and neck stiffness  Occasional R wrist pain   Skin: Negative for pallor and rash  Neurological: Negative for dizziness, tremors, syncope, weakness, light-headedness, numbness and headaches  Hematological: Does not bruise/bleed easily  Psychiatric/Behavioral: Positive for dysphoric mood and suicidal ideas  Negative for agitation and confusion  The patient is nervous/anxious  Past Medical and Surgical History:     Past Medical History:   Diagnosis Date    Anxiety     Depression     History of psychiatric hospitalization     Panic attack     Psychiatric disorder     Suicidal ideations     Suicide attempt ([facility] [address] ) 2016       No past surgical history on file  Meds/Allergies:    all medications and allergies reviewed    Allergies: No Known Allergies    Social History:     Marital Status: Single    Substance Use History:   Social History     Substance and Sexual Activity   Alcohol Use Yes    Frequency: 2-4 times a month    Drinks per session: 1 or 2    Binge frequency: Never     Social History     Tobacco Use   Smoking Status Never Smoker   Smokeless Tobacco Never Used     Social History     Substance and Sexual Activity   Drug Use No       Family History:    History reviewed  No pertinent family history      Physical Exam:     Vitals:   Blood Pressure: 121/69 (01/07/21 1900)  Pulse: 72 (01/07/21 1900)  Temperature: (!) 97 2 °F (36 2 °C) (01/07/21 1900)  Temp Source: Tympanic (01/07/21 1900)  Respirations: 16 (01/07/21 1900)  Height: 5' 4 57" (164 cm) (01/07/21 1452)  Weight - Scale: 91 3 kg (201 lb 4 5 oz) (01/07/21 1452)  SpO2: 98 % (01/07/21 1452)    Physical Exam  Vitals signs and nursing note reviewed  Constitutional:       Appearance: Normal appearance  Comments: Appears comfortable, no acute distress   HENT:      Head: Normocephalic  Eyes:      General: No scleral icterus  Extraocular Movements: Extraocular movements intact  Conjunctiva/sclera: Conjunctivae normal    Neck:      Musculoskeletal: Normal range of motion  Cardiovascular:      Rate and Rhythm: Normal rate and regular rhythm  Heart sounds: S1 normal and S2 normal    Pulmonary:      Breath sounds: Normal breath sounds  No wheezing, rhonchi or rales  Abdominal:      General: Bowel sounds are normal       Palpations: Abdomen is soft  Tenderness: There is no abdominal tenderness  There is no guarding or rebound  Musculoskeletal:         General: No swelling, tenderness or deformity  Comments: Right wrist in brace  No swelling noted  Neurovascularly intact  Not painful  Able to move upper/lower ext bilaterally without difficulty   Skin:     General: Skin is warm and dry  Neurological:      Mental Status: He is alert and oriented to person, place, and time  Psychiatric:         Mood and Affect: Mood is depressed  Speech: Speech normal          Behavior: Behavior normal            Additional Data:     Lab Results: I have personally reviewed pertinent reports  No results found for: HGBA1C            Imaging: I have personally reviewed pertinent reports  No orders to display       EKG, Pathology, and Other Studies Reviewed on Admission:   · Reviewed as above    ** Please Note: This note has been constructed using a voice recognition system   **

## 2021-01-08 NOTE — CASE MANAGEMENT
Pt met with CM to review & sign ROIs for [facility](residence), [facility], [facility](therapist), & Dr Eliseo Casanova)  Pt is a 24 y/o female, who identifies as male & prefers to be called, "Diandra Roseann"  Pt is familiar to CM from previous admissions at [facility], the most recent being in September 10-18, 2020  At time of discharge, Pt went to stay with a family that his [address] knew in [facility], as Pt reported his relationship & living with his father was stressful  Pt said that he did go to the home, but was only there for 3 days  He said that he had a panic attack, that caused a break in reality  Pt said that he has been living back with his father, but is in the process of moving into the [facility] program(apartment)  Pt said that he currently goes to their day program Mon, Weds, & Fri at 11:30 AM     Pt is single & denied having any children  Pt's mom  in , when he was 2 y/o  Pt reported that his father drove him to the ER, after he had a therapy session  Pt said that they recommended he go to [employer] (crisis res) but Pt said, "with all the turmoil, I felt I needed to be in the hospital"  Pt reported he hasn't been to see Dr Zeeshan Kuhn at [facility] & needs to schedule an appointment  Pt said that he didn't get his Apripiprazole filled but took his other medications  Pt said that he continues to have weekly sessions with his therapist, Claudia Peguero  Pt said that he has a  through [employer], Milad Davis  Pt said, "I'm adjusting to my life feeling like shit"  Pt talked about a shift & how his past relationships have not been approved by his father  Pt rambling about an ex-boyfriend being a pedophile & he finally told Pt he was never attracted to him physically, stating, "how bout that blow to your self esteem"  Pt talking about trauma at [facility], but it was unclear when he had gone there    Pt said that he does like the [employer], but has not existed outside of his father's home & is not sure how to adjust   Pt confirmed that his PCP is Dr Albin Dewitt & he reported he has only been eating 1 meal/day  CM asked if he has experience weight loss, but he said no  Pt reported he informed the attending physician of his erratic eating pattern but maybe had not explained it all  Pt also reported that the ligaments in his wrist were demolished & he had a limp walking into the ER  Pt reported some social alcohol use, on rare occasions, but denied any drug or tobacco use  Pt is currently working at [employer], as a doorman  Pt reported he has worked there about 4 months & had told HR he would be in the hospital for 1-2 weeks  Pt said that he did not need CM to contact them, but would need a return to work note  Pt denied owning any firearms, but said that his father does  Pt said that his dad has been saying he will get a truck to help him move, however, Pt began crying hysterically, stating his father is always saying these things & doesn't do them  Pt quickly composed himself & voice return to normal rate/volume/rhythm  Pt denied any legal issues  Pt then expressing he may have an orange juice allergy,but did not want this listed

## 2021-01-09 PROCEDURE — 99232 SBSQ HOSP IP/OBS MODERATE 35: CPT | Performed by: STUDENT IN AN ORGANIZED HEALTH CARE EDUCATION/TRAINING PROGRAM

## 2021-01-09 RX ADMIN — FERROUS SULFATE TAB 325 MG (65 MG ELEMENTAL FE) 325 MG: 325 (65 FE) TAB at 17:08

## 2021-01-09 RX ADMIN — FERROUS SULFATE TAB 325 MG (65 MG ELEMENTAL FE) 325 MG: 325 (65 FE) TAB at 08:51

## 2021-01-09 RX ADMIN — ARIPIPRAZOLE 5 MG: 5 TABLET ORAL at 08:51

## 2021-01-09 RX ADMIN — SERTRALINE HYDROCHLORIDE 100 MG: 100 TABLET ORAL at 08:52

## 2021-01-09 RX ADMIN — MELATONIN TAB 3 MG 6 MG: 3 TAB at 21:26

## 2021-01-09 NOTE — PROGRESS NOTES
Daniella Chan has been isolative to room, pt is med and meal compliant  When approached by this writer pt was open to conversation and currently jackie SI,HI,AVH  Rates anxiety and depression as low and was encouraged to seek staff as needed  Will continue to monitor

## 2021-01-09 NOTE — TREATMENT TEAM
01/09/21 0700   Team Meeting   Meeting Type Daily Rounds   Team Members Present   Team Members Present Physician;Nurse   Physician Team Member Dr Lang Hamman Team Member 31 Latesha Holman   Patient/Family Present   Patient Present No   Patient's Family Present No   Daily Rounds: Pt c/o fatigue and being tired yesterday  Also c/o lightheadedness in the afternoon  VSS  Passive SI, consenting for safety on unit

## 2021-01-09 NOTE — PROGRESS NOTES
Progress Note - Behavioral Health   Janice Lopez 23 y o  adult MRN: [id number]  Unit/Bed#: [facility] 260-02 Encounter: [id number]    Assessment/Plan   Principal Problem:    Recurrent major depressive disorder ([address] )  Active Problems:    Medical clearance for psychiatric admission    Generalized anxiety disorder with panic attacks    PTSD (post-traumatic stress disorder)    Iron deficiency anemia      Subjective: Patient was seen, chart reviewed and case discussed with team  Patient appears sad, anxious, scant, withdrawn, guarded, labile and depressed  Sleep has improved  Denies SI/HI/AH/VH     Psychiatric Review of Systems:  Behavior over the last 24 hours:  improved  Sleep: normal  Appetite: normal  Medication side effects: No  ROS: no complaints, all others negative    Current Medications:  Current Facility-Administered Medications   Medication Dose Route Frequency    acetaminophen (TYLENOL) tablet 325 mg  325 mg Oral Q6H PRN    acetaminophen (TYLENOL) tablet 650 mg  650 mg Oral Q4H PRN    aluminum-magnesium hydroxide-simethicone (MYLANTA) oral suspension 30 mL  30 mL Oral Q4H PRN    ARIPiprazole (ABILIFY) tablet 5 mg  5 mg Oral Daily    benztropine (COGENTIN) injection 1 mg  1 mg Intramuscular Q6H PRN    benztropine (COGENTIN) tablet 1 mg  1 mg Oral Q6H PRN    ferrous sulfate tablet 325 mg  325 mg Oral BID With Meals    haloperidol (HALDOL) tablet 5 mg  5 mg Oral Q6H PRN    haloperidol lactate (HALDOL) injection 5 mg  5 mg Intramuscular Q6H PRN    hydrOXYzine HCL (ATARAX) tablet 25 mg  25 mg Oral Q6H PRN    hydrOXYzine HCL (ATARAX) tablet 50 mg  50 mg Oral Q6H PRN    ibuprofen (MOTRIN) tablet 600 mg  600 mg Oral Q6H PRN    LORazepam (ATIVAN) injection 2 mg  2 mg Intramuscular Q6H PRN    LORazepam (ATIVAN) tablet 1 mg  1 mg Oral Q6H PRN    magnesium hydroxide (MILK OF MAGNESIA) oral suspension 30 mL  30 mL Oral Daily PRN    melatonin tablet 6 mg  6 mg Oral HS    OLANZapine (ZyPREXA) IM injection 10 mg 10 mg Intramuscular Q8H PRN    OLANZapine (ZyPREXA) tablet 10 mg  10 mg Oral Q8H PRN    prazosin (MINIPRESS) capsule 1 mg  1 mg Oral Daily    risperiDONE (RisperDAL M-TABS) dispersible tablet 1 mg  1 mg Oral Q3H PRN    sertraline (ZOLOFT) tablet 100 mg  100 mg Oral Daily       Behavioral Health Medications:   all current active meds have been reviewed, continue current psychiatric medications and current meds:   Current Facility-Administered Medications   Medication Dose Route Frequency    acetaminophen (TYLENOL) tablet 325 mg  325 mg Oral Q6H PRN    acetaminophen (TYLENOL) tablet 650 mg  650 mg Oral Q4H PRN    aluminum-magnesium hydroxide-simethicone (MYLANTA) oral suspension 30 mL  30 mL Oral Q4H PRN    ARIPiprazole (ABILIFY) tablet 5 mg  5 mg Oral Daily    benztropine (COGENTIN) injection 1 mg  1 mg Intramuscular Q6H PRN    benztropine (COGENTIN) tablet 1 mg  1 mg Oral Q6H PRN    ferrous sulfate tablet 325 mg  325 mg Oral BID With Meals    haloperidol (HALDOL) tablet 5 mg  5 mg Oral Q6H PRN    haloperidol lactate (HALDOL) injection 5 mg  5 mg Intramuscular Q6H PRN    hydrOXYzine HCL (ATARAX) tablet 25 mg  25 mg Oral Q6H PRN    hydrOXYzine HCL (ATARAX) tablet 50 mg  50 mg Oral Q6H PRN    ibuprofen (MOTRIN) tablet 600 mg  600 mg Oral Q6H PRN    LORazepam (ATIVAN) injection 2 mg  2 mg Intramuscular Q6H PRN    LORazepam (ATIVAN) tablet 1 mg  1 mg Oral Q6H PRN    magnesium hydroxide (MILK OF MAGNESIA) oral suspension 30 mL  30 mL Oral Daily PRN    melatonin tablet 6 mg  6 mg Oral HS    OLANZapine (ZyPREXA) IM injection 10 mg  10 mg Intramuscular Q8H PRN    OLANZapine (ZyPREXA) tablet 10 mg  10 mg Oral Q8H PRN    prazosin (MINIPRESS) capsule 1 mg  1 mg Oral Daily    risperiDONE (RisperDAL M-TABS) dispersible tablet 1 mg  1 mg Oral Q3H PRN    sertraline (ZOLOFT) tablet 100 mg  100 mg Oral Daily         Vitals:  Vitals:    01/09/21 0746   BP: 106/58   Pulse: 65   Resp: 16   Temp: 98 4 °F (36 9 °C) SpO2:        Laboratory results:  I have personally reviewed all pertinent laboratory/tests results  Mental Status Evaluation:  Appearance:  age appropriate   Behavior:  guarded   Speech:  soft   Mood:  anxious and depressed   Affect:  constricted and labile   Language Appropriate   Thought Process:  goal directed, logical and perserverative   Thought Content:  normal   Perceptual Disturbances: None   Risk Potential: Suicidal Ideations none, Homicidal Ideations none and Potential for Aggression No   Sensorium:  person, place, time/date, situation, day of week, month of year and year   Cognition:  recent and remote memory grossly intact   Consciousness:  alert    Attention: attention span appeared shorter than expected for age   Insight:  fair   Judgment: fair   Gait/Station: normal gait/station   Motor Activity: no abnormal movements     Progress Toward Goals: Progressing    Recommended Treatment: Continue with pharmacotherapy, group therapy, milieu therapy and occupational therapy

## 2021-01-10 PROCEDURE — 99232 SBSQ HOSP IP/OBS MODERATE 35: CPT | Performed by: STUDENT IN AN ORGANIZED HEALTH CARE EDUCATION/TRAINING PROGRAM

## 2021-01-10 RX ORDER — PRAZOSIN HYDROCHLORIDE 1 MG/1
1 CAPSULE ORAL
Status: DISCONTINUED | OUTPATIENT
Start: 2021-01-10 | End: 2021-01-11

## 2021-01-10 RX ADMIN — PRAZOSIN HYDROCHLORIDE 1 MG: 1 CAPSULE ORAL at 08:40

## 2021-01-10 RX ADMIN — SERTRALINE HYDROCHLORIDE 100 MG: 100 TABLET ORAL at 08:40

## 2021-01-10 RX ADMIN — MELATONIN TAB 3 MG 6 MG: 3 TAB at 21:39

## 2021-01-10 RX ADMIN — PRAZOSIN HYDROCHLORIDE 1 MG: 1 CAPSULE ORAL at 21:39

## 2021-01-10 RX ADMIN — ARIPIPRAZOLE 5 MG: 5 TABLET ORAL at 08:39

## 2021-01-10 RX ADMIN — FERROUS SULFATE TAB 325 MG (65 MG ELEMENTAL FE) 325 MG: 325 (65 FE) TAB at 16:55

## 2021-01-10 RX ADMIN — FERROUS SULFATE TAB 325 MG (65 MG ELEMENTAL FE) 325 MG: 325 (65 FE) TAB at 08:39

## 2021-01-10 NOTE — NURSING NOTE
Pt scant in conversation  Pt reported feeling overwhelmed with anxiety  Pt reported "I don't like noises I can't control " Pt denies SI/HI/AVH  Pt reported "I have been having flashbacks of things that didn't happen " Pt reported "It's like memories while awake of things that didn't happen " Pt denies any questions or concerns at this time  Pt visible on unit  Attended evening group

## 2021-01-10 NOTE — TREATMENT TEAM
01/10/21 0700   Team Meeting   Meeting Type Daily Rounds   Team Members Present   Team Members Present Physician;Nurse   Physician Team Member Dr Stephanie Shah Team Member 31 Latehsa Holman   Patient/Family Present   Patient Present No   Patient's Family Present No   Daily Rounds: Pt feeling overwhelmed and anxious with unit acuity yesterday evening  Denies SI/HI/AVH  Reports flashbacks  Slept overnight

## 2021-01-10 NOTE — PROGRESS NOTES
Progress Note - Behavioral Health   Trice Collins 23 y o  adult MRN: [id number]  Unit/Bed#: [facility] 260-02 Encounter: [id number]    Assessment/Plan   Principal Problem:    Recurrent major depressive disorder ([address] )  Active Problems:    Medical clearance for psychiatric admission    Generalized anxiety disorder with panic attacks    PTSD (post-traumatic stress disorder)    Iron deficiency anemia      Subjective: Patient was seen, chart reviewed and case discussed with team  Patient appears scant, withdrawn, depressed, labile and internally preoccupied  She is compliant with medications   Sleep is poor due to flashbacks and nightmares  Psychiatric Review of Systems:  Behavior over the last 24 hours:  unchanged  Sleep: insomnia  Appetite: normal  Medication side effects: No  ROS: no complaints, all others negative    Current Medications:  Current Facility-Administered Medications   Medication Dose Route Frequency    acetaminophen (TYLENOL) tablet 325 mg  325 mg Oral Q6H PRN    acetaminophen (TYLENOL) tablet 650 mg  650 mg Oral Q4H PRN    aluminum-magnesium hydroxide-simethicone (MYLANTA) oral suspension 30 mL  30 mL Oral Q4H PRN    ARIPiprazole (ABILIFY) tablet 5 mg  5 mg Oral Daily    benztropine (COGENTIN) injection 1 mg  1 mg Intramuscular Q6H PRN    benztropine (COGENTIN) tablet 1 mg  1 mg Oral Q6H PRN    ferrous sulfate tablet 325 mg  325 mg Oral BID With Meals    haloperidol (HALDOL) tablet 5 mg  5 mg Oral Q6H PRN    haloperidol lactate (HALDOL) injection 5 mg  5 mg Intramuscular Q6H PRN    hydrOXYzine HCL (ATARAX) tablet 25 mg  25 mg Oral Q6H PRN    hydrOXYzine HCL (ATARAX) tablet 50 mg  50 mg Oral Q6H PRN    ibuprofen (MOTRIN) tablet 600 mg  600 mg Oral Q6H PRN    LORazepam (ATIVAN) injection 2 mg  2 mg Intramuscular Q6H PRN    LORazepam (ATIVAN) tablet 1 mg  1 mg Oral Q6H PRN    magnesium hydroxide (MILK OF MAGNESIA) oral suspension 30 mL  30 mL Oral Daily PRN    melatonin tablet 6 mg  6 mg Oral HS  OLANZapine (ZyPREXA) IM injection 10 mg  10 mg Intramuscular Q8H PRN    OLANZapine (ZyPREXA) tablet 10 mg  10 mg Oral Q8H PRN    prazosin (MINIPRESS) capsule 1 mg  1 mg Oral HS    risperiDONE (RisperDAL M-TABS) dispersible tablet 1 mg  1 mg Oral Q3H PRN    sertraline (ZOLOFT) tablet 100 mg  100 mg Oral Daily       Behavioral Health Medications:   all current active meds have been reviewed, continue current psychiatric medications and current meds:   Current Facility-Administered Medications   Medication Dose Route Frequency    acetaminophen (TYLENOL) tablet 325 mg  325 mg Oral Q6H PRN    acetaminophen (TYLENOL) tablet 650 mg  650 mg Oral Q4H PRN    aluminum-magnesium hydroxide-simethicone (MYLANTA) oral suspension 30 mL  30 mL Oral Q4H PRN    ARIPiprazole (ABILIFY) tablet 5 mg  5 mg Oral Daily    benztropine (COGENTIN) injection 1 mg  1 mg Intramuscular Q6H PRN    benztropine (COGENTIN) tablet 1 mg  1 mg Oral Q6H PRN    ferrous sulfate tablet 325 mg  325 mg Oral BID With Meals    haloperidol (HALDOL) tablet 5 mg  5 mg Oral Q6H PRN    haloperidol lactate (HALDOL) injection 5 mg  5 mg Intramuscular Q6H PRN    hydrOXYzine HCL (ATARAX) tablet 25 mg  25 mg Oral Q6H PRN    hydrOXYzine HCL (ATARAX) tablet 50 mg  50 mg Oral Q6H PRN    ibuprofen (MOTRIN) tablet 600 mg  600 mg Oral Q6H PRN    LORazepam (ATIVAN) injection 2 mg  2 mg Intramuscular Q6H PRN    LORazepam (ATIVAN) tablet 1 mg  1 mg Oral Q6H PRN    magnesium hydroxide (MILK OF MAGNESIA) oral suspension 30 mL  30 mL Oral Daily PRN    melatonin tablet 6 mg  6 mg Oral HS    OLANZapine (ZyPREXA) IM injection 10 mg  10 mg Intramuscular Q8H PRN    OLANZapine (ZyPREXA) tablet 10 mg  10 mg Oral Q8H PRN    prazosin (MINIPRESS) capsule 1 mg  1 mg Oral HS    risperiDONE (RisperDAL M-TABS) dispersible tablet 1 mg  1 mg Oral Q3H PRN    sertraline (ZOLOFT) tablet 100 mg  100 mg Oral Daily         Vitals:  Vitals:    01/10/21 0732   BP: 111/58   Pulse: 71   Resp: 16   Temp: (!) 96 6 °F (35 9 °C)   SpO2:        Laboratory results:  I have personally reviewed all pertinent laboratory/tests results  Mental Status Evaluation:  Appearance:  age appropriate and casually dressed   Behavior:  guarded   Speech:  soft   Mood:  anxious and depressed   Affect:  constricted   Language Appropriate   Thought Process:  goal directed and logical   Thought Content:  normal   Perceptual Disturbances: None   Risk Potential: Suicidal Ideations none, Homicidal Ideations none and Potential for Aggression No   Sensorium:  person, place, time/date, situation, day of week, month of year and year   Cognition:  recent and remote memory grossly intact   Consciousness:  alert and awake    Attention: attention span appeared shorter than expected for age   Insight:  fair   Judgment: fair   Gait/Station: normal gait/station   Motor Activity: no abnormal movements     Progress Toward Goals: Progressing    Recommended Treatment: Continue with pharmacotherapy, group therapy, milieu therapy and occupational therapy

## 2021-01-10 NOTE — PLAN OF CARE
Problem: Ineffective Coping  Goal: Identifies healthy coping skills  Outcome: Progressing  Goal: Demonstrates healthy coping skills  Outcome: Progressing  Goal: Participates in unit activities  Description: Interventions:  - Provide therapeutic environment   - Provide required programming   - Redirect inappropriate behaviors   Outcome: Progressing  Goal: Patient/Family participate in treatment and DC plans  Description: Interventions:  - Provide therapeutic environment  Outcome: Progressing  Goal: Patient/Family verbalizes awareness of resources  Outcome: Progressing     Problem: Depression  Goal: Verbalize thoughts and feelings  Description: Interventions:  - Assess and re-assess patient's level of risk   - Engage patient in 1:1 interactions, daily, for a minimum of 15 minutes   - Encourage patient to express feelings, fears, frustrations, hopes   Outcome: Progressing  Goal: Refrain from harming self  Description: Interventions:  - Monitor patient closely, per order   - Supervise medication ingestion, monitor effects and side effects   Outcome: Progressing  Goal: Attend and participate in unit activities, including therapeutic, recreational, and educational groups  Description: Interventions:  - Provide therapeutic and educational activities daily, encourage attendance and participation, and document same in the medical record   Outcome: Progressing  Goal: Complete daily ADLs, including personal hygiene independently, as able  Description: Interventions:  - Observe, teach, and assist patient with ADLS  -  Monitor and promote a balance of rest/activity, with adequate nutrition and elimination   Outcome: Progressing     Problem: Anxiety  Goal: Anxiety is at manageable level  Description: Interventions:  - Assess and monitor patient's anxiety level  - Monitor for signs and symptoms (heart palpitations, chest pain, shortness of breath, headaches, nausea, feeling jumpy, restlessness, irritable, apprehensive)     - Collaborate with interdisciplinary team and initiate plan and interventions as ordered    - [place] patient to unit/surroundings  - Explain treatment plan  - Encourage participation in care  - Encourage verbalization of concerns/fears  - Identify coping mechanisms  - Assist in developing anxiety-reducing skills  - Administer/offer alternative therapies  - Limit or eliminate stimulants  Outcome: Progressing

## 2021-01-10 NOTE — PROGRESS NOTES
Pt has been visible on unit  Quiet keeps to self polite/social on approach appears guarded  Individual rates anxiety and depression at a 1 at this time and says at times feels as if her insides are screaming but not at this time  Pt denies any needs at this time

## 2021-01-11 PROCEDURE — 99232 SBSQ HOSP IP/OBS MODERATE 35: CPT | Performed by: STUDENT IN AN ORGANIZED HEALTH CARE EDUCATION/TRAINING PROGRAM

## 2021-01-11 RX ORDER — PRAZOSIN HYDROCHLORIDE 1 MG/1
2 CAPSULE ORAL
Status: DISCONTINUED | OUTPATIENT
Start: 2021-01-11 | End: 2021-01-15 | Stop reason: HOSPADM

## 2021-01-11 RX ORDER — LANOLIN ALCOHOL/MO/W.PET/CERES
6 CREAM (GRAM) TOPICAL
Status: DISCONTINUED | OUTPATIENT
Start: 2021-01-11 | End: 2021-01-15 | Stop reason: HOSPADM

## 2021-01-11 RX ADMIN — FERROUS SULFATE TAB 325 MG (65 MG ELEMENTAL FE) 325 MG: 325 (65 FE) TAB at 09:30

## 2021-01-11 RX ADMIN — PRAZOSIN HYDROCHLORIDE 2 MG: 1 CAPSULE ORAL at 22:24

## 2021-01-11 RX ADMIN — SERTRALINE HYDROCHLORIDE 100 MG: 100 TABLET ORAL at 09:30

## 2021-01-11 RX ADMIN — ARIPIPRAZOLE 5 MG: 5 TABLET ORAL at 09:30

## 2021-01-11 RX ADMIN — FERROUS SULFATE TAB 325 MG (65 MG ELEMENTAL FE) 325 MG: 325 (65 FE) TAB at 16:58

## 2021-01-11 NOTE — PROGRESS NOTES
Patient walking halls often, social with peers and attending groups  Denies SI, HI, AVH  Reports "palpitations, but stress palpitations" writer encouraged pt to notify MD tomorrow just to make them aware, he agreed to do so  Pt otherwise pleasant

## 2021-01-11 NOTE — PLAN OF CARE
Problem: Depression  Goal: Treatment Goal: Demonstrate behavioral control of depressive symptoms, verbalize feelings of improved mood/affect, and adopt new coping skills prior to discharge  Outcome: Progressing  Goal: Verbalize thoughts and feelings  Description: Interventions:  - Assess and re-assess patient's level of risk   - Engage patient in 1:1 interactions, daily, for a minimum of 15 minutes   - Encourage patient to express feelings, fears, frustrations, hopes   Outcome: Progressing  Goal: Refrain from harming self  Description: Interventions:  - Monitor patient closely, per order   - Supervise medication ingestion, monitor effects and side effects   Outcome: Progressing  Goal: Refrain from self-neglect  Outcome: Progressing  Goal: Attend and participate in unit activities, including therapeutic, recreational, and educational groups  Description: Interventions:  - Provide therapeutic and educational activities daily, encourage attendance and participation, and document same in the medical record   Outcome: Progressing  Goal: Complete daily ADLs, including personal hygiene independently, as able  Description: Interventions:  - Observe, teach, and assist patient with ADLS  -  Monitor and promote a balance of rest/activity, with adequate nutrition and elimination   Outcome: Progressing     Problem: Anxiety  Goal: Anxiety is at manageable level  Description: Interventions:  - Assess and monitor patient's anxiety level  - Monitor for signs and symptoms (heart palpitations, chest pain, shortness of breath, headaches, nausea, feeling jumpy, restlessness, irritable, apprehensive)  - Collaborate with interdisciplinary team and initiate plan and interventions as ordered    - [place] patient to unit/surroundings  - Explain treatment plan  - Encourage participation in care  - Encourage verbalization of concerns/fears  - Identify coping mechanisms  - Assist in developing anxiety-reducing skills  - Administer/offer alternative therapies  - Limit or eliminate stimulants  Outcome: Progressing

## 2021-01-11 NOTE — PROGRESS NOTES
Calm/cooperative  Compliant with medication administration  When asked how day was going, his reply was "so so " Denies SI/HI, AVH  Social with peers  Attends groups

## 2021-01-11 NOTE — PROGRESS NOTES
Progress Note - Behavioral Health   April Bartholomew 23 y o  adult MRN: [id number]  Unit/Bed#: [facility] 260-02 Encounter: [id number]    Assessment/Plan   Principal Problem:    Recurrent major depressive disorder ([address] )  Active Problems:    Medical clearance for psychiatric admission    Generalized anxiety disorder with panic attacks    PTSD (post-traumatic stress disorder)    Iron deficiency anemia      Subjective: Patient was seen, chart reviewed and case discussed with team   Patient appears calm and cooperative  Reports that his mood is getting better but remains anxious at times  Suicidal ideations have diminished  Sleep has improved but has nightmares at times   Denies /  Psychiatric Review of Systems:  Behavior over the last 24 hours:  improved  Sleep: normal  Appetite: normal  Medication side effects: No  ROS: no complaints, all others negative    Current Medications:  Current Facility-Administered Medications   Medication Dose Route Frequency    acetaminophen (TYLENOL) tablet 325 mg  325 mg Oral Q6H PRN    acetaminophen (TYLENOL) tablet 650 mg  650 mg Oral Q4H PRN    aluminum-magnesium hydroxide-simethicone (MYLANTA) oral suspension 30 mL  30 mL Oral Q4H PRN    ARIPiprazole (ABILIFY) tablet 5 mg  5 mg Oral Daily    benztropine (COGENTIN) injection 1 mg  1 mg Intramuscular Q6H PRN    benztropine (COGENTIN) tablet 1 mg  1 mg Oral Q6H PRN    ferrous sulfate tablet 325 mg  325 mg Oral BID With Meals    haloperidol (HALDOL) tablet 5 mg  5 mg Oral Q6H PRN    haloperidol lactate (HALDOL) injection 5 mg  5 mg Intramuscular Q6H PRN    hydrOXYzine HCL (ATARAX) tablet 25 mg  25 mg Oral Q6H PRN    hydrOXYzine HCL (ATARAX) tablet 50 mg  50 mg Oral Q6H PRN    ibuprofen (MOTRIN) tablet 600 mg  600 mg Oral Q6H PRN    LORazepam (ATIVAN) injection 2 mg  2 mg Intramuscular Q6H PRN    LORazepam (ATIVAN) tablet 1 mg  1 mg Oral Q6H PRN    magnesium hydroxide (MILK OF MAGNESIA) oral suspension 30 mL  30 mL Oral Daily PRN  melatonin tablet 6 mg  6 mg Oral HS PRN    OLANZapine (ZyPREXA) IM injection 10 mg  10 mg Intramuscular Q8H PRN    OLANZapine (ZyPREXA) tablet 10 mg  10 mg Oral Q8H PRN    prazosin (MINIPRESS) capsule 2 mg  2 mg Oral HS    risperiDONE (RisperDAL M-TABS) dispersible tablet 1 mg  1 mg Oral Q3H PRN    sertraline (ZOLOFT) tablet 100 mg  100 mg Oral Daily       Behavioral Health Medications:   all current active meds have been reviewed, continue current psychiatric medications and current meds:   Current Facility-Administered Medications   Medication Dose Route Frequency    acetaminophen (TYLENOL) tablet 325 mg  325 mg Oral Q6H PRN    acetaminophen (TYLENOL) tablet 650 mg  650 mg Oral Q4H PRN    aluminum-magnesium hydroxide-simethicone (MYLANTA) oral suspension 30 mL  30 mL Oral Q4H PRN    ARIPiprazole (ABILIFY) tablet 5 mg  5 mg Oral Daily    benztropine (COGENTIN) injection 1 mg  1 mg Intramuscular Q6H PRN    benztropine (COGENTIN) tablet 1 mg  1 mg Oral Q6H PRN    ferrous sulfate tablet 325 mg  325 mg Oral BID With Meals    haloperidol (HALDOL) tablet 5 mg  5 mg Oral Q6H PRN    haloperidol lactate (HALDOL) injection 5 mg  5 mg Intramuscular Q6H PRN    hydrOXYzine HCL (ATARAX) tablet 25 mg  25 mg Oral Q6H PRN    hydrOXYzine HCL (ATARAX) tablet 50 mg  50 mg Oral Q6H PRN    ibuprofen (MOTRIN) tablet 600 mg  600 mg Oral Q6H PRN    LORazepam (ATIVAN) injection 2 mg  2 mg Intramuscular Q6H PRN    LORazepam (ATIVAN) tablet 1 mg  1 mg Oral Q6H PRN    magnesium hydroxide (MILK OF MAGNESIA) oral suspension 30 mL  30 mL Oral Daily PRN    melatonin tablet 6 mg  6 mg Oral HS PRN    OLANZapine (ZyPREXA) IM injection 10 mg  10 mg Intramuscular Q8H PRN    OLANZapine (ZyPREXA) tablet 10 mg  10 mg Oral Q8H PRN    prazosin (MINIPRESS) capsule 2 mg  2 mg Oral HS    risperiDONE (RisperDAL M-TABS) dispersible tablet 1 mg  1 mg Oral Q3H PRN    sertraline (ZOLOFT) tablet 100 mg  100 mg Oral Daily      Vitals:  Vitals:    01/11/21 0740   BP: 103/55   Pulse: 74   Resp: 16   Temp: 97 5 °F (36 4 °C)   SpO2:        Laboratory results:  I have personally reviewed all pertinent laboratory/tests results  Mental Status Evaluation:  Appearance:  age appropriate   Behavior:  normal   Speech:  normal pitch and normal volume   Mood:  anxious   Affect:  labile and mood-congruent   Language Appropriate   Thought Process:  goal directed and logical   Thought Content:  normal   Perceptual Disturbances: None   Risk Potential: Suicidal Ideations none, Homicidal Ideations none and Potential for Aggression No   Sensorium:  person, place, time/date, situation, day of week, month of year and year   Cognition:  recent and remote memory grossly intact   Consciousness:  alert    Attention: attention span appeared shorter than expected for age   Insight:  fair   Judgment: fair   Gait/Station: normal gait/station   Motor Activity: no abnormal movements     Progress Toward Goals: Progressing    Recommended Treatment: Continue with pharmacotherapy, group therapy, milieu therapy and occupational therapy      1  Increase prazosin to 2mg PO HS

## 2021-01-11 NOTE — CASE MANAGEMENT
AKIN received a return call from [address] [place] [address] is scheduled to see Dr Maggie Raza in the [facility] office on 1/26 at 2:45 PM  AKIN confirmed fax number [phone]

## 2021-01-11 NOTE — PROGRESS NOTES
Pt is visible in the milieu and is pleasant and scant in conversation  Pt denies anxiety and depression as well as SI/HI/AVH  Pt reports to this writer she is waiting to hear from CM regarding results for discharge  Pt denies having any questions or concerns at the time of interview

## 2021-01-12 PROCEDURE — 99232 SBSQ HOSP IP/OBS MODERATE 35: CPT | Performed by: PHYSICIAN ASSISTANT

## 2021-01-12 RX ORDER — FERROUS SULFATE 325(65) MG
325 TABLET ORAL
Status: DISCONTINUED | OUTPATIENT
Start: 2021-01-13 | End: 2021-01-15 | Stop reason: HOSPADM

## 2021-01-12 RX ADMIN — ARIPIPRAZOLE 5 MG: 5 TABLET ORAL at 09:14

## 2021-01-12 RX ADMIN — PRAZOSIN HYDROCHLORIDE 2 MG: 1 CAPSULE ORAL at 22:12

## 2021-01-12 RX ADMIN — SERTRALINE HYDROCHLORIDE 100 MG: 100 TABLET ORAL at 09:14

## 2021-01-12 RX ADMIN — FERROUS SULFATE TAB 325 MG (65 MG ELEMENTAL FE) 325 MG: 325 (65 FE) TAB at 09:14

## 2021-01-12 NOTE — PROGRESS NOTES
Progress Note - Behavioral Health   Elroy Mariscal 23 y o  adult MRN: [id number]  Unit/Bed#: [facility] 260-02 Encounter: [id number]    Assessment/Plan   Principal Problem:    Recurrent major depressive disorder ([address] )  Active Problems:    Medical clearance for psychiatric admission    Generalized anxiety disorder with panic attacks    PTSD (post-traumatic stress disorder)    Iron deficiency anemia      Subjective: Patient was seen, chart reviewed and case discussed with team   Patient reports decreased PTSD-related symptoms  States he did not have nightmares last night  Also denied flashbacks during the day  Was less guarded in conversation and overall cooperative  Seen out attending groups  States he struggles socially due to autism diagnosis  Reports depression is slowly decreasing and denied suicidal thoughts today  Reports improvements in sleep, appetite, and energy levels  Denied excessive anxiety and denied panic attacks  No signs of josafat or agitation  Denied psychosis and delusional material   Medication compliant  Appears to be tolerating medications well without serious side effects      Psychiatric Review of Systems:  Behavior over the last 24 hours:  improved  Sleep: normal  Appetite: normal  Medication side effects: No  ROS: no complaints, all others negative    Current Medications:  Current Facility-Administered Medications   Medication Dose Route Frequency    acetaminophen (TYLENOL) tablet 325 mg  325 mg Oral Q6H PRN    acetaminophen (TYLENOL) tablet 650 mg  650 mg Oral Q4H PRN    aluminum-magnesium hydroxide-simethicone (MYLANTA) oral suspension 30 mL  30 mL Oral Q4H PRN    ARIPiprazole (ABILIFY) tablet 5 mg  5 mg Oral Daily    benztropine (COGENTIN) injection 1 mg  1 mg Intramuscular Q6H PRN    benztropine (COGENTIN) tablet 1 mg  1 mg Oral Q6H PRN    [START ON 1/13/2021] ferrous sulfate tablet 325 mg  325 mg Oral Daily With Breakfast    haloperidol (HALDOL) tablet 5 mg  5 mg Oral Q6H PRN  haloperidol lactate (HALDOL) injection 5 mg  5 mg Intramuscular Q6H PRN    hydrOXYzine HCL (ATARAX) tablet 25 mg  25 mg Oral Q6H PRN    hydrOXYzine HCL (ATARAX) tablet 50 mg  50 mg Oral Q6H PRN    ibuprofen (MOTRIN) tablet 600 mg  600 mg Oral Q6H PRN    LORazepam (ATIVAN) injection 2 mg  2 mg Intramuscular Q6H PRN    LORazepam (ATIVAN) tablet 1 mg  1 mg Oral Q6H PRN    magnesium hydroxide (MILK OF MAGNESIA) oral suspension 30 mL  30 mL Oral Daily PRN    melatonin tablet 6 mg  6 mg Oral HS PRN    OLANZapine (ZyPREXA) IM injection 10 mg  10 mg Intramuscular Q8H PRN    OLANZapine (ZyPREXA) tablet 10 mg  10 mg Oral Q8H PRN    prazosin (MINIPRESS) capsule 2 mg  2 mg Oral HS    risperiDONE (RisperDAL M-TABS) dispersible tablet 1 mg  1 mg Oral Q3H PRN    sertraline (ZOLOFT) tablet 100 mg  100 mg Oral Daily       Behavioral Health Medications: all current active meds have been reviewed  Vitals:  Vitals:    01/12/21 0732   BP: 107/55   Pulse: 77   Resp: 16   Temp: 97 5 °F (36 4 °C)   SpO2:        Laboratory results:  I have personally reviewed all pertinent laboratory/tests results  Mental Status Evaluation:  Appearance:  age appropriate   Behavior:  normal   Speech:  soft   Mood:  normal   Affect:  mood-congruent   Language Appropriate   Thought Process:  normal   Thought Content:  normal   Perceptual Disturbances: None   Risk Potential: no SI/HI, no agressive behavior   Sensorium:  person, place, time/date and situation   Cognition:  recent and remote memory grossly intact   Consciousness:  awake    Attention: attention span and concentration were age appropriate   Insight:  fair   Judgment: fair   Gait/Station: normal gait/station   Motor Activity: no abnormal movements     Progress Toward Goals: Slowly improving    Recommended Treatment: Continue with pharmacotherapy, group therapy, milieu therapy and occupational therapy  1   Continue current medications  2    Disposition planning     Risks, benefits and possible side effects of Medications:   Risks, benefits, and possible side effects of medications explained to patient and patient verbalizes understanding

## 2021-01-12 NOTE — PROGRESS NOTES
01/12/21 1000 01/12/21 1315 01/12/21 1415   Activity/Group Checklist   Group [employer] meeting  (Gratitude/goal setting) Life Skills  (emotion ID - positive coping skills) Other (Comment)  (relaxation techniques - guided meditation)   Attendance Attended Attended Attended   Attendance Duration (min) 31-45 46-60 31-45   Interactions Interacted appropriately Interacted appropriately Interacted appropriately   Affect/Mood Appropriate Appropriate Appropriate   Goals Achieved Able to listen to others; Able to engage in interactions Identified feelings; Discussed coping strategies; Able to listen to others; Able to engage in interactions; Able to reflect/comment on own behavior;Able to manage/cope with feelings Identified feelings; Able to engage in interactions; Able to listen to others

## 2021-01-12 NOTE — PROGRESS NOTES
Pt calm and cooperative, appropriate during interaction  Pt reports improvement of symptoms and smiles when talking about discharge  Denies any questions or concerns  Pt denies SI/HI/AVH  Visible on unit and social with select peers

## 2021-01-13 VITALS
BODY MASS INDEX: 33.9 KG/M2 | DIASTOLIC BLOOD PRESSURE: 55 MMHG | TEMPERATURE: 98.2 F | OXYGEN SATURATION: 99 % | SYSTOLIC BLOOD PRESSURE: 101 MMHG | WEIGHT: 201 LBS | RESPIRATION RATE: 16 BRPM | HEART RATE: 72 BPM

## 2021-01-13 PROCEDURE — 99232 SBSQ HOSP IP/OBS MODERATE 35: CPT | Performed by: PHYSICIAN ASSISTANT

## 2021-01-13 RX ADMIN — PRAZOSIN HYDROCHLORIDE 2 MG: 1 CAPSULE ORAL at 21:26

## 2021-01-13 RX ADMIN — FERROUS SULFATE TAB 325 MG (65 MG ELEMENTAL FE) 325 MG: 325 (65 FE) TAB at 09:06

## 2021-01-13 RX ADMIN — SERTRALINE HYDROCHLORIDE 100 MG: 100 TABLET ORAL at 09:06

## 2021-01-13 RX ADMIN — ARIPIPRAZOLE 5 MG: 5 TABLET ORAL at 09:06

## 2021-01-13 NOTE — PROGRESS NOTES
Treatment Plan Meeting:  Diagnosis of recurrent major depressive disorder, generalized anxiety disorder, & PTSD reviewed  Discussed short term goals for decrease in depression, anxiety, & suicidal thoughts  Pt has outpatient services & is able to live in an apartment through [facility] program, but has not moved in yet  Pt will continue with outpatient providers at discharge  All parties in agreement & treatment plan was signed       01/08/21 6823   Team Meeting   Meeting Type Tx Team Meeting   Initial Conference Date 01/08/21   Next Conference Date 02/04/21   Team Members Present   Team Members Present Physician;Nurse;   Physician Team Member Dr Yumiko Deleon Team Member [facility] Management Team Member Gilberto   Patient/Family Present   Patient Present No   Patient's Family Present No

## 2021-01-13 NOTE — PROGRESS NOTES
Status: Pt SI/HI & reported flashbacks, but then said they were not of anything that happened to him     Medication: Prazosin switched to HS / no PRNs  D/C: end of week?     01/11/21 0750   Team Meeting   Meeting Type Daily Rounds   Team Members Present   Team Members Present Physician;Nurse;Occupational Therapist;   Physician Team Member Dr Sadi Raygoza / Dipesh Solis Team Member [facility] Management Team Member Kishan Miller / Umang Manuel   OT Team Member Judith Henry / Pinky Phipps   Patient/Family Present   Patient Present No   Patient's Family Present No

## 2021-01-13 NOTE — PROGRESS NOTES
Status: Pt is visible on the unit & pleasant  He denies any SI/HI  Pt slept overnight; no issues to report    Medication: no changes / no PRNs  D/C: end of week / CM will follow-up with [facility] again     01/12/21 0750   Team Meeting   Meeting Type Daily Rounds   Team Members Present   Team Members Present Physician;Nurse;Occupational Therapist;   Physician Team Member Dr Maralyn Seip / Jaden Grubbs Team Member Sánchez Miller / Yadira Simon Management Team Member Lucian Damico / Wen Andrea   OT Team Member Enedelia Wheeler / Katie Serrano   Patient/Family Present   Patient Present No   Patient's Family Present No

## 2021-01-13 NOTE — PROGRESS NOTES
Status: Pt is scant & denies any SI  Pt showered & attended groups; no issues to report    Medication: no changes / no PRNs  D/C: Friday / CM coordinating with [facility]     01/13/21 0830   Team Meeting   Meeting Type Daily Rounds   Team Members Present   Team Members Present Physician;Nurse;Occupational Therapist;   Physician Team Member Dr Togn Julien / Bertin General Team Member [facility] Management Team Member Morgan Ruano / David Merritt   OT Team Member Domingo Jones / Anni Jara   Patient/Family Present   Patient Present No   Patient's Family Present No

## 2021-01-13 NOTE — PROGRESS NOTES
Progress Note - Behavioral Health   Kimberly Pinedo 23 y o  adult MRN: [id number]  Unit/Bed#: [facility] 260-02 Encounter: [id number]    Assessment/Plan   Principal Problem:    Recurrent major depressive disorder ([address] )  Active Problems:    Medical clearance for psychiatric admission    Generalized anxiety disorder with panic attacks    PTSD (post-traumatic stress disorder)    Iron deficiency anemia      Subjective: Patient was seen, chart reviewed and case discussed with team   Patient overall cooperative and appears less guarded  States that she feels less depressed and more future oriented  Denied any suicidal thoughts today  Reports improved energy, appetite, sleep, and feels less hopeless  States anxiety is under control and denied any recent panic attacks  Denied psychosis and delusional material   No signs of josafat or agitation  Medication compliant  Appears to be tolerating medications well without serious side effects        Psychiatric Review of Systems:  Behavior over the last 24 hours:  improved  Sleep: normal  Appetite: normal  Medication side effects: No  ROS: no complaints, all others negative    Current Medications:  Current Facility-Administered Medications   Medication Dose Route Frequency    acetaminophen (TYLENOL) tablet 325 mg  325 mg Oral Q6H PRN    acetaminophen (TYLENOL) tablet 650 mg  650 mg Oral Q4H PRN    aluminum-magnesium hydroxide-simethicone (MYLANTA) oral suspension 30 mL  30 mL Oral Q4H PRN    ARIPiprazole (ABILIFY) tablet 5 mg  5 mg Oral Daily    benztropine (COGENTIN) injection 1 mg  1 mg Intramuscular Q6H PRN    benztropine (COGENTIN) tablet 1 mg  1 mg Oral Q6H PRN    ferrous sulfate tablet 325 mg  325 mg Oral Daily With Breakfast    haloperidol (HALDOL) tablet 5 mg  5 mg Oral Q6H PRN    haloperidol lactate (HALDOL) injection 5 mg  5 mg Intramuscular Q6H PRN    hydrOXYzine HCL (ATARAX) tablet 25 mg  25 mg Oral Q6H PRN    hydrOXYzine HCL (ATARAX) tablet 50 mg  50 mg Oral Q6H PRN    ibuprofen (MOTRIN) tablet 600 mg  600 mg Oral Q6H PRN    LORazepam (ATIVAN) injection 2 mg  2 mg Intramuscular Q6H PRN    LORazepam (ATIVAN) tablet 1 mg  1 mg Oral Q6H PRN    magnesium hydroxide (MILK OF MAGNESIA) oral suspension 30 mL  30 mL Oral Daily PRN    melatonin tablet 6 mg  6 mg Oral HS PRN    OLANZapine (ZyPREXA) IM injection 10 mg  10 mg Intramuscular Q8H PRN    OLANZapine (ZyPREXA) tablet 10 mg  10 mg Oral Q8H PRN    prazosin (MINIPRESS) capsule 2 mg  2 mg Oral HS    risperiDONE (RisperDAL M-TABS) dispersible tablet 1 mg  1 mg Oral Q3H PRN    sertraline (ZOLOFT) tablet 100 mg  100 mg Oral Daily       Behavioral Health Medications: all current active meds have been reviewed  Vitals:  Vitals:    01/13/21 0726   BP: 96/54   Pulse: 81   Resp: 16   Temp: 98 8 °F (37 1 °C)   SpO2:        Laboratory results:  I have personally reviewed all pertinent laboratory/tests results  Mental Status Evaluation:  Appearance:  age appropriate   Behavior:  normal   Speech:  soft   Mood:  Less depressed   Affect:  constricted   Language Appropriate   Thought Process:  Goal directed   Thought Content:  normal   Perceptual Disturbances: None   Risk Potential: No SI/HI, no signs of aggression   Sensorium:  person, place, time/date and situation   Cognition:  recent and remote memory grossly intact   Consciousness:  alert    Attention: attention span and concentration were age appropriate   Insight:  partial   Judgment: improving   Gait/Station: did not assess   Motor Activity: no abnormal movements     Progress Toward Goals: slowly improving    Recommended Treatment: Continue with pharmacotherapy, group therapy, milieu therapy and occupational therapy  1   Continue current medications  2  Disposition planning     Risks, benefits and possible side effects of Medications:   Risks, benefits, and possible side effects of medications explained to patient and patient verbalizes understanding

## 2021-01-13 NOTE — NURSING NOTE
Pt visible and social on the unit  Pt OOB for dinner  Pt denies any depression and anxiety  Pt reported having a good day and sleeping well overnight  Pt denies any questions or concerns

## 2021-01-13 NOTE — PROGRESS NOTES
Pt was pleasant and visible in milieu  He slept well andreported dreaming about all kinds of food  He was joking and smiling while discussing his dreams  Denies any SI and AH  Compliant with medications  Attending groups this morning  Social with staff and peers  Denies any concerns and/or questions

## 2021-01-14 PROCEDURE — 99232 SBSQ HOSP IP/OBS MODERATE 35: CPT | Performed by: PHYSICIAN ASSISTANT

## 2021-01-14 RX ADMIN — ARIPIPRAZOLE 5 MG: 5 TABLET ORAL at 09:12

## 2021-01-14 RX ADMIN — PRAZOSIN HYDROCHLORIDE 2 MG: 1 CAPSULE ORAL at 21:28

## 2021-01-14 RX ADMIN — SERTRALINE HYDROCHLORIDE 100 MG: 100 TABLET ORAL at 09:12

## 2021-01-14 RX ADMIN — FERROUS SULFATE TAB 325 MG (65 MG ELEMENTAL FE) 325 MG: 325 (65 FE) TAB at 09:11

## 2021-01-14 NOTE — PROGRESS NOTES
Progress Note - Behavioral Health   Janice Lopez 23 y o  adult MRN: [id number]  Unit/Bed#: [facility] 260-02 Encounter: [id number]    Assessment/Plan   Principal Problem:    Recurrent major depressive disorder ([address] )  Active Problems:    Medical clearance for psychiatric admission    Generalized anxiety disorder with panic attacks    PTSD (post-traumatic stress disorder)    Iron deficiency anemia      Subjective: Patient was seen, chart reviewed and case discussed with team   Patient was anxious today when told about possible discharge  States he was not ready  Reports that anxiety does fluctuate at times and needs more mental per parent is to go home  States likes going to groups  Reports depression has decreased and is now denying suicidal thoughts  Contracts for safety  Reports improvement in energy, appetite, and sleep  No signs of josafat or agitation  Denied psychosis and delusional material   Does have delayed and off topic responses but that is due to underlying intellectual disability  Medication compliant  Appears to be tolerating medications well without serious side effects  Tentative discharge tomorrow      Psychiatric Review of Systems:  Behavior over the last 24 hours:  improved  Sleep: normal  Appetite: normal  Medication side effects: No  ROS: no complaints, all others negative    Current Medications:  Current Facility-Administered Medications   Medication Dose Route Frequency    acetaminophen (TYLENOL) tablet 325 mg  325 mg Oral Q6H PRN    acetaminophen (TYLENOL) tablet 650 mg  650 mg Oral Q4H PRN    aluminum-magnesium hydroxide-simethicone (MYLANTA) oral suspension 30 mL  30 mL Oral Q4H PRN    ARIPiprazole (ABILIFY) tablet 5 mg  5 mg Oral Daily    benztropine (COGENTIN) injection 1 mg  1 mg Intramuscular Q6H PRN    benztropine (COGENTIN) tablet 1 mg  1 mg Oral Q6H PRN    ferrous sulfate tablet 325 mg  325 mg Oral Daily With Breakfast    haloperidol (HALDOL) tablet 5 mg  5 mg Oral Q6H PRN  haloperidol lactate (HALDOL) injection 5 mg  5 mg Intramuscular Q6H PRN    hydrOXYzine HCL (ATARAX) tablet 25 mg  25 mg Oral Q6H PRN    hydrOXYzine HCL (ATARAX) tablet 50 mg  50 mg Oral Q6H PRN    ibuprofen (MOTRIN) tablet 600 mg  600 mg Oral Q6H PRN    LORazepam (ATIVAN) injection 2 mg  2 mg Intramuscular Q6H PRN    LORazepam (ATIVAN) tablet 1 mg  1 mg Oral Q6H PRN    magnesium hydroxide (MILK OF MAGNESIA) oral suspension 30 mL  30 mL Oral Daily PRN    melatonin tablet 6 mg  6 mg Oral HS PRN    OLANZapine (ZyPREXA) IM injection 10 mg  10 mg Intramuscular Q8H PRN    OLANZapine (ZyPREXA) tablet 10 mg  10 mg Oral Q8H PRN    prazosin (MINIPRESS) capsule 2 mg  2 mg Oral HS    risperiDONE (RisperDAL M-TABS) dispersible tablet 1 mg  1 mg Oral Q3H PRN    sertraline (ZOLOFT) tablet 100 mg  100 mg Oral Daily       Behavioral Health Medications: all current active meds have been reviewed and continue current psychiatric medications  Vitals:  Vitals:    01/14/21 1441   BP: 124/68   Pulse: 83   Resp: 18   Temp: (!) 96 9 °F (36 1 °C)   SpO2:        Laboratory results:    I have personally reviewed all pertinent laboratory/tests results    Most Recent Labs:   Lab Results   Component Value Date    WBC 8 06 01/08/2021    RBC 3 83 (L) 01/08/2021    HGB 10 4 (L) 01/08/2021    HCT 33 9 (L) 01/08/2021     01/08/2021    RDW 12 6 01/08/2021    NEUTROABS 4 37 01/08/2021    SODIUM 140 01/08/2021    K 3 7 01/08/2021     (H) 01/08/2021    CO2 28 01/08/2021    BUN 13 01/08/2021    CREATININE 0 63 01/08/2021    GLUC 74 01/08/2021    GLUF 83 09/11/2020    CALCIUM 9 0 01/08/2021    AST 10 01/08/2021    ALT 15 01/08/2021    ALKPHOS 45 (L) 01/08/2021    TP 6 7 01/08/2021    ALB 3 1 (L) 01/08/2021    TBILI 0 36 01/08/2021    CHOLESTEROL 142 09/11/2020    HDL 51 09/11/2020    TRIG 35 09/11/2020    LDLCALC 84 09/11/2020    NONHDLC 91 09/11/2020    YCG3ESLNUBNY 1 490 01/08/2021    PREGSERUM Negative 01/08/2021    RPR Non-Reactive 01/08/2021    HGBA1C 4 9 01/08/2021    EAG 94 01/08/2021       Mental Status Evaluation:  Appearance:  casually dressed   Behavior:  less guarded, cooperative   Speech:  delayed and soft   Mood:  anxious, less depressed   Affect:  broad   Language Appropriate   Thought Process:  goal directed and linear   Thought Content:  normal   Perceptual Disturbances: None   Risk Potential: Denied SI/HI  Potential for aggression: No   Sensorium:  person, place and time/date   Cognition:  recent and remote memory grossly intact   Consciousness:  alert    Attention: attention span appeared shorter than expected for age   Insight:  partial   Judgment: improving   Gait/Station: normal gait/station and normal balance   Motor Activity: no abnormal movements     Progress Toward Goals: progressing    Recommended Treatment: Continue with pharmacotherapy, group therapy, milieu therapy and occupational therapy  1   Continue current medications  2  Disposition planning     Risks, benefits and possible side effects of Medications:   Risks, benefits, and possible side effects of medications explained to patient and patient verbalizes understanding

## 2021-01-14 NOTE — PROGRESS NOTES
Pt calm and cooperative, pleasant and appropriate during interaction  Denies SI/HI/AVH  Denies feelings of depression or anxiety  Reports feeling "good" today  Sleeping and eating well  Visible on unit and attending groups

## 2021-01-14 NOTE — PLAN OF CARE
Problem: Ineffective Coping  Goal: Identifies healthy coping skills  Outcome: Progressing  Goal: Demonstrates healthy coping skills  Outcome: Progressing  Goal: Participates in unit activities  Description: Interventions:  - Provide therapeutic environment   - Provide required programming   - Redirect inappropriate behaviors   Outcome: Progressing  Goal: Patient/Family participate in treatment and DC plans  Description: Interventions:  - Provide therapeutic environment  Outcome: Progressing  Goal: Patient/Family verbalizes awareness of resources  Outcome: Progressing     Problem: Depression  Goal: Verbalize thoughts and feelings  Description: Interventions:  - Assess and re-assess patient's level of risk   - Engage patient in 1:1 interactions, daily, for a minimum of 15 minutes   - Encourage patient to express feelings, fears, frustrations, hopes   Outcome: Progressing  Goal: Refrain from harming self  Description: Interventions:  - Monitor patient closely, per order   - Supervise medication ingestion, monitor effects and side effects   Outcome: Progressing  Goal: Attend and participate in unit activities, including therapeutic, recreational, and educational groups  Description: Interventions:  - Provide therapeutic and educational activities daily, encourage attendance and participation, and document same in the medical record   Outcome: Progressing  Goal: Complete daily ADLs, including personal hygiene independently, as able  Description: Interventions:  - Observe, teach, and assist patient with ADLS  -  Monitor and promote a balance of rest/activity, with adequate nutrition and elimination   Outcome: Progressing     Problem: Anxiety  Goal: Anxiety is at manageable level  Description: Interventions:  - Assess and monitor patient's anxiety level  - Monitor for signs and symptoms (heart palpitations, chest pain, shortness of breath, headaches, nausea, feeling jumpy, restlessness, irritable, apprehensive)     - Collaborate with interdisciplinary team and initiate plan and interventions as ordered    - [place] patient to unit/surroundings  - Explain treatment plan  - Encourage participation in care  - Encourage verbalization of concerns/fears  - Identify coping mechanisms  - Assist in developing anxiety-reducing skills  - Administer/offer alternative therapies  - Limit or eliminate stimulants  Outcome: Progressing

## 2021-01-14 NOTE — DISCHARGE INSTR - APPOINTMENTS
Zayda Edge RN, our [employer], will be calling you after your discharge, on the phone number that you provided  She will be available as an additional support, if needed  If you wish to speak with her, you may contact Kavin Solomon at [phone]

## 2021-01-14 NOTE — PLAN OF CARE
Problem: Ineffective Coping  Goal: Identifies healthy coping skills  Outcome: Progressing     Problem: Ineffective Coping  Goal: Patient/Family participate in treatment and DC plans  Description: Interventions:  - Provide therapeutic environment  Outcome: Progressing     Problem: Depression  Goal: Treatment Goal: Demonstrate behavioral control of depressive symptoms, verbalize feelings of improved mood/affect, and adopt new coping skills prior to discharge  Outcome: Progressing

## 2021-01-14 NOTE — CASE MANAGEMENT
CM completed the [facility] MH/DS ICM referral & met with Pt to have him sign it  Completed referral & clinical faxed  CM received a call from Pt's counselor at [facility] & she reported that they had concerns with Pt going to the apartment  She reported that they felt Pt should go to his father's house for 30 days, & then work to transition to his apartment  CM reviewed that she & Pt had talked about & made plans regarding Pt going to his apartment & starting "fresh"  [facility] said that they are an independent living program & they felt that Pt may not be ready for this, as he had not even been able to get his belongings all moved in  Yumi reported they felt that t needed ICM services, & this was something he should have applied for himself  CM reviewed that she did complete an ICM referral, & reviewed that she had not heard of patients ever self-referring for ICM services, as the application ask for referral source information  CM questioned what the [facility] program has done, if they had these concerns? Yumi said that CM could talk to Pt about going to her father's, & CM reviewed that the [facility] program would need to talk to Pt directly, as this was their planning  [employer] agreed to participate in a Teams meeting today

## 2021-01-14 NOTE — DISCHARGE INSTR - OTHER ORDERS
Per Saloni Corado from the [facility] program, please call [facility] at [phone] & request placement at crisis residence  Crisis Intervention   [facility] Crisis [facility]phone [facility] [phone] provides 24 hour, 7 day a week crisis phone service for any individual in mental health crisis in [facility]  The telephone service is a hotline, manned by a trained professional  Individuals can receive support, information and referral services 24 hours a day 7 days a week  Text CONNECT to 339414 from anywhere in the [place], anytime, about any type of crisis  A live, trained Crisis Counselor receives the text and lets you know that they are here to listen  The volunteer Crisis Counselor will help you move from a hot moment to a cool moment  The [address] [employer] is a toll free telephone line that can be accessed by consumers who are looking for someone to talk to for support or guidance  It is a support service that is designed to promote recovery while focusing on strengths and providing guidance  This line is run by a provider, but is staffed with consumers and/or certified peer specialists who receive a great deal of training and supervision  MH/DS funds this program  The phone number is [phone]  The hours of operation are from 6 PM to 10 PM daily  NAME SPECIALITY INSURANCES/PAYMENTS ADDRESS/PHONE   Depression/Bipolar Support Group 1st & 3rd Wednesdays of each Month  7:00 pm  9:00 pm [facility] [address], [place] [address]     What you need to know aboutcoronavirus disease 2019 (COVID-19)     What is coronavirus disease 2019 (COVID-19)? Coronavirus disease 2019 (COVID-19) is a respiratory illness that can spread from person to person  The virus that causes COVID-19 is a novel coronavirus that was first identified during an investigation into an outbreak in [place]  Can people in the [place]  get COVID-19? Yes   COVID-19 is spreading from person to person in parts of the [place]  Risk of infection with COVID-19 is higher for people who are close contacts of someone known to have COVID-19, for example healthcare workers, or household members  Other people at higher risk for infection are those who live in or have recently been in an area with ongoing spread of COVID-19  Learn more about places with ongoing spread at   [facility]  html#geographic  Have there been cases of COVID-19 in the [place] ?   Yes  The first case of COVID-19 in the [place] was reported on January 21, 2020  The current count of cases of COVID-19 in the [place] is available on [employer] [facility]  How does COVID-19 spread? The virus that causes COVID-19 probably emerged from an animal source, but is now spreading from person to person  The virus is thought to spread mainly between people who are in close contact with one another (within about 6 feet) through respiratory droplets produced when an infected person coughs or sneezes  It also may be possible that a person can get COVID-19 by touching a surface or object that has the virus on it and then touching their own mouth, nose, or possibly their eyes, but this is not thought to be the main way the virus spreads  Learn what is known about the spread of newly emerged coronaviruses at [facility]  What are the symptoms of COVID-19? Patients with COVID-19 have had mild to severe respiratory illness with symptoms of   fever   cough   shortness of breath  What are severe complications from this virus? Some patients have pneumonia in both lungs, multi-organ failure and in some cases death  How can I help protect myself? People can help protect themselves from respiratory illness with everyday preventive actions      Avoid close contact with people who are sick  Avoid touching your eyes, nose, and mouth withunwashed hands  Wash your hands often with soap and water for at least 20 seconds  Use an alcohol-based hand  that contains at least 60% alcohol if soap and water are not available  If you are sick, to keep from spreading respiratory illness to others, you should   Stay home when you are sick  Cover your cough or sneeze with a tissue, then throw the tissue in the trash  Clean and disinfect frequently touched objectsand surfaces  What should I do if I recently traveled from an area with ongoing spread of COVID-19? If you have traveled from an affected area, there may be restrictions on your movements for up to 2 weeks  If you develop symptoms during that period (fever, cough, trouble breathing), seek medical advice  Call the office of your health care provider before you go, and tell them about your travel and your symptoms  They will give you instructions on how to get care without exposing other people to your illness  While sick, avoid contact with people, don't go out and delay any travel to reduce the possibility of spreading illness to others  Is there a vaccine? There is currently no vaccine to protect against COVID-19  The best way to prevent infection is to take everyday preventive actions, like avoiding close contact with people who are sick and washing your hands often  Is there a treatment? There is no specific antiviral treatment for COVID-19  People with COVID-19 can seek medical care to helprelieve symptoms  For more information: www cdc gov/YUULD69SI 605406-Z 03/03/2020       What to do if you are sick withcoronavirus disease 2019 (COVID-19)     If you are sick with COVID-19 or suspect you are infected with the virus that causes COVID-19, follow the steps below to help prevent the disease from spreading to people in your home and community     Stay home except to get medical care   You should restrict activities outside your home, except for getting medical care  Do not go to work, school, or public areas  Avoid using public transportation, ride-sharing, or taxis  Separate yourself from other people and animals inyour home  People: As much as possible, you should stay in a specific room and away from other people in your home  Also, you should use a separate bathroom, if available  Animals: Do not handle pets or other animals while sick  See COVID-19 and Animals for more information  Call ahead before visiting your doctor   If you have a medical appointment, call the healthcare provider and tell them that you have or may have COVID-19  This will help the healthcare provider's office take steps to keep other people from getting infected or exposed  Wear a facemask  You should wear a facemask when you are around other people (e g , sharing a room or vehicle) or pets and before you enter a healthcare provider's office  If you are not able to wear a facemask (for example, because it causes trouble breathing), then people who live with you should not stay in the same room with you, or they should wear a facemask if they enteryour room  Cover your coughs and sneezes   Cover your mouth and nose with a tissue when you cough or sneeze  Throw used tissues in a lined trash can; immediately wash your hands with soap and water for at least 20 seconds or clean your hands with an alcohol-based hand  that contains at least 60 to 95% alcohol, covering all surfaces of your hands and rubbing them together until they feel dry  Soap and water should be used preferentially if hands are visibly dirty  Avoid sharing personal household items   You should not share dishes, drinking glasses, cups, eating utensils, towels, or bedding with other people or pets in your home  After using these items, they should be washed thoroughly with soap and water     Clean your hands often  Wash your hands often with soap and water for at least 20 seconds  If soap and water are not available, clean your hands with an alcohol-based hand  that contains at least 60% alcohol, covering all surfaces of your hands and rubbing them together until they feel dry  Soap and water should be used preferentially if hands are visibly dirty  Avoid touching your eyes, nose, and mouth with unwashed hands  Clean all "high-touch" surfaces every day  High touch surfaces include counters, tabletops, doorknobs, bathroom fixtures, toilets, phones, keyboards, tablets, and bedside tables  Also, clean any surfaces that may have blood, stool, or body fluids on them  Use a household cleaning spray or wipe, according to the label instructions  Labels contain instructions for safe and effective use of the cleaning product including precautions you should take when applying the product, such as wearing gloves and making sure you have good ventilation during use of the product  Monitor your symptoms  Seek prompt medical attention if your illness is worsening (e g , difficulty breathing)  Before seeking care, call your healthcare provider and tell them that you have, or are being evaluated for, COVID-19  Put on a facemask before you enter the facility  These steps will help the healthcare provider's office to keep other people in the office or waiting room from getting infectedor exposed  Ask your healthcare provider to call the local or state health department  Persons who are placed under active monitoring or facilitated self-monitoring should follow instructions provided by their local health department or occupational health professionals, as appropriate  If you have a medical emergency and need to call 911, notify the dispatch personnel that you have, or are being evaluated for COVID-19  If possible, put on a facemask before emergency medical services arrive    Discontinuing home isolation  Patients with confirmed COVID-19 should remain under home isolation precautions until the risk of secondary transmission to others is thought to be low  The decision to discontinue home isolation precautions should be made on a case-by-case basis, in consultation with healthcare providers and state and local health departments  For more information: www cdc gov/DEZXO17GV 209059-T 02/24/2020       Stay home when you are sick,except to get medical care  Wash your hands often with soap and water for at least 20 seconds  Cover your cough or sneeze with a tissue, then throw the tissue in the trash  Clean and disinfect frequently touched objects and surfaces  Avoid touching your eyes, nose, and mouth  STOP THE SPREAD OF GERMS  For more information: www cdc gov/COVID19 Avoid close contact with people who are sick  Help prevent the spread of respiratory diseases like COVID-19

## 2021-01-14 NOTE — NURSING NOTE
Pt social and visible on the unit  Pt cooperative and pleasant during interaction  Pt denies any anxiety or depression  Pt reported having a good phone call and day  Pt denies any SI/HI/AVH and questions or concerns at this time

## 2021-01-14 NOTE — CASE MANAGEMENT
CM met with Pt to review tentative d/c plans for Friday  Pt said that he thought he would be ready & was feeling well with his current medication regiment  Pt said that he planned to go directly to his apartment & that he had work clothes & other necessities there already  Pt said that he would go to his dad's over the weekend to try to get the remainder of his stuff  CM asked what address Pt would need transportation to & he said [address], [address], [place] [address]  Pt had no other questions/concerns for Pt at this time

## 2021-01-14 NOTE — PROGRESS NOTES
Status: Pt denying s/s & attends groups  No issus to report     Medication: no changes / no PRNs  D/C: Friday / will talk to Pt about possible discharge today, however, CM was waiting to hear back from the [address] [facility] program       01/14/21 0754   Team Meeting   Meeting Type Daily Rounds   Team Members Present   Team Members Present Physician;Nurse;;Occupational Therapist   Physician Team Member Dr Makeda Medeiros / Marcy Renae Team Member [facility] Management Team Member Kiet Macdonald / Ha Veras   OT Team Member Aracelis Rios / Ivan Cea   Patient/Family Present   Patient Present No   Patient's Family Present No

## 2021-01-15 VITALS
BODY MASS INDEX: 33.54 KG/M2 | SYSTOLIC BLOOD PRESSURE: 111 MMHG | RESPIRATION RATE: 16 BRPM | TEMPERATURE: 98.1 F | HEIGHT: 65 IN | WEIGHT: 201.28 LBS | OXYGEN SATURATION: 98 % | DIASTOLIC BLOOD PRESSURE: 53 MMHG | HEART RATE: 80 BPM

## 2021-01-15 PROCEDURE — 99239 HOSP IP/OBS DSCHRG MGMT >30: CPT | Performed by: STUDENT IN AN ORGANIZED HEALTH CARE EDUCATION/TRAINING PROGRAM

## 2021-01-15 RX ORDER — PRAZOSIN HYDROCHLORIDE 2 MG/1
2 CAPSULE ORAL
Qty: 15 CAPSULE | Refills: 0 | Status: SHIPPED | OUTPATIENT
Start: 2021-01-15 | End: 2021-07-13 | Stop reason: SDUPTHER

## 2021-01-15 RX ORDER — SERTRALINE HYDROCHLORIDE 100 MG/1
100 TABLET, FILM COATED ORAL DAILY
Qty: 15 TABLET | Refills: 0 | Status: SHIPPED | OUTPATIENT
Start: 2021-01-15 | End: 2021-07-13 | Stop reason: SDUPTHER

## 2021-01-15 RX ORDER — FERROUS SULFATE 325(65) MG
325 TABLET ORAL
Qty: 30 TABLET | Refills: 0 | Status: SHIPPED | OUTPATIENT
Start: 2021-01-15

## 2021-01-15 RX ORDER — ARIPIPRAZOLE 5 MG/1
5 TABLET ORAL DAILY
Qty: 15 TABLET | Refills: 0 | Status: SHIPPED | OUTPATIENT
Start: 2021-01-15 | End: 2021-07-13 | Stop reason: SDUPTHER

## 2021-01-15 RX ADMIN — FERROUS SULFATE TAB 325 MG (65 MG ELEMENTAL FE) 325 MG: 325 (65 FE) TAB at 09:49

## 2021-01-15 RX ADMIN — ARIPIPRAZOLE 5 MG: 5 TABLET ORAL at 09:49

## 2021-01-15 RX ADMIN — SERTRALINE HYDROCHLORIDE 100 MG: 100 TABLET ORAL at 09:49

## 2021-01-15 NOTE — BH TRANSITION RECORD
Contact Information: If you have any questions, concerns, pended studies, tests and/or procedures, or emergencies regarding your inpatient behavioral health visit  Please contact [facility] unit [phone] and ask to speak to a , nurse or physician  A contact is available 24 hours/ 7 days a week at this number  Summary of Procedures Performed During your Stay:  Below is a list of major procedures performed during your hospital stay and a summary of results:  - No major procedures performed  Pending Studies (From admission, onward)    None        If studies are pending at discharge, follow up with your PCP and/or referring provider

## 2021-01-15 NOTE — DISCHARGE INSTRUCTIONS
Anxiety   WHAT YOU SHOULD KNOW:   Anxiety is a condition that causes you to feel excessive worry, uneasiness, or fear  Family or work stress, smoking, caffeine, and alcohol can increase your risk for anxiety  Certain medicines or health conditions can also increase your risk  Anxiety may begin gradually, and can become a long-term condition if it is not managed or treated  AFTER YOU LEAVE:   Medicines:   · Medicines  can help you feel more calm and relaxed, and decrease your symptoms  · Take your medicine as directed  Contact your healthcare provider if you think your medicine is not helping or if you have side effects  Tell him if you are allergic to any medicine  Keep a list of the medicines, vitamins, and herbs you take  Include the amounts, and when and why you take them  Bring the list or the pill bottles to follow-up visits  Carry your medicine list with you in case of an emergency  Follow up with your healthcare provider within 2 weeks or as directed:  Write down your questions so you remember to ask them during your visits  Manage anxiety:   · Go to counseling as directed  Cognitive behavioral therapy can help you understand and change how you react to events that trigger your symptoms  · Find ways to manage your symptoms  Activities such as exercise, meditation, or listening to music can help you relax  · Practice deep breathing  Breathing can change how your body reacts to stress  Focus on taking slow, deep breaths several times a day, or during an anxiety attack  Breathe in through your nose, and out through your mouth  · Avoid caffeine  Caffeine can make your symptoms worse  Avoid foods or drinks that are meant to increase your energy level  · Limit or avoid alcohol  Ask your healthcare provider if alcohol is safe for you  You may not be able to drink alcohol if you take certain anxiety or depression medicines  Limit alcohol to 1 drink per day if you are a woman   Limit alcohol to 2 drinks per day if you are a man  A drink of alcohol is 12 ounces of beer, 5 ounces of wine, or 1½ ounces of liquor  Contact your healthcare provider if:   · Your symptoms get worse or do not get better with treatment  · You think your medicine may be causing side effects  · Your anxiety keeps you from doing your regular daily activities  · You have new symptoms since your last visit  · You have questions or concerns about your condition or care  Seek care immediately or call 911 if:   · You have chest pain, tightness, or heaviness that may spread to your shoulders, arms, jaw, neck, or back  · You feel like hurting yourself or someone else  · You feel dizzy, lightheaded, or faint  © 2014 [address] is for End User's use only and may not be sold, redistributed or otherwise used for commercial purposes  All illustrations and images included in CareNotes® are the copyrighted property of A D A M , Inc  or [employer]  The above information is an  only  It is not intended as medical advice for individual conditions or treatments  Talk to your doctor, nurse or pharmacist before following any medical regimen to see if it is safe and effective for you  Depression   WHAT YOU NEED TO KNOW:   Depression is a medical condition that causes feelings of sadness or hopelessness that do not go away  Depression may cause you to lose interest in things you used to enjoy  These feelings may interfere with your daily life  DISCHARGE INSTRUCTIONS:   Call your local emergency number (911 in the [address] [place] [address],[address]) if:   · You think about harming yourself or someone else  · You have done something on purpose to hurt yourself  Call your therapist or doctor if:   · Your symptoms do not improve  · You cannot make it to your next appointment  · You have new symptoms  · You have questions or concerns about your condition or care      The following resources are available at any time to help you, if needed:   · [address] [facility]: 4-323.984.7377 (6-493-814-SEIM)     · Suicide Hotline: 7-671.885.9166 (3-270-YAYRCOZ)     · For a list of international numbers: https://save org/find-help/international-resources/    Medicines:   · Antidepressants  may be given to improve or balance your mood  You may need to take this medicine for several weeks before you begin to feel better  · Take your medicine as directed  Contact your healthcare provider if you think your medicine is not helping or if you have side effects  Tell him of her if you are allergic to any medicine  Keep a list of the medicines, vitamins, and herbs you take  Include the amounts, and when and why you take them  Bring the list or the pill bottles to follow-up visits  Carry your medicine list with you in case of an emergency  Therapy  is often used together with medicine to relieve depression  Therapy is a way for you to talk about your feelings and anything that may be causing depression  Therapy can be done alone or in a group  It may also be done with family members or a significant other  Self-care:   · Get regular physical activity  Try to be active for 30 minutes, 3 to 5 days a week  Physical activity can help relieve depression  Work with your healthcare provider to develop a plan that you enjoy  It may help to ask someone to be active with you  · Create a regular sleep schedule  A routine can help you relax before bed  Listen to music, read, or do yoga  Try to go to bed and wake up at the same time every day  Sleep is important for emotional health  · Eat a variety of healthy foods  Healthy foods include fruits, vegetables, whole-grain breads, low-fat dairy products, lean meats, fish, and cooked beans  A healthy meal plan is low in fat, salt, and added sugar  · Do not drink alcohol or use drugs  Alcohol and drugs can make depression worse   Talk to your therapist or doctor if you need help quitting  Follow up with your healthcare provider as directed: Your healthcare provider will monitor your progress at follow-up visits  He or she will also monitor your medicine if you take antidepressants  Your healthcare provider will ask if the medicine is helping  Tell him or her about any side effects or problems you may have with your medicine  The type or amount of medicine may need to be changed  Write down your questions so you remember to ask them during your visits  © Copyright [address] Information is for End User's use only and may not be sold, redistributed or otherwise used for commercial purposes  All illustrations and images included in CareNotes® are the copyrighted property of A D A M , Inc  or [address] [place], [place] [address]crys   The above information is an  only  It is not intended as medical advice for individual conditions or treatments  Talk to your doctor, nurse or pharmacist before following any medical regimen to see if it is safe and effective for you

## 2021-01-15 NOTE — PROGRESS NOTES
Pt calm and cooperative, pleasant and appropriate during interaction  Reports readiness for discharge today  Pt states "I have my ducks in a row"  Denies any questions or concerns  Denies SI/HI/AVH  Denies anxiety or depression

## 2021-01-15 NOTE — NURSING NOTE
AVS reviewed and prescriptions given  Pt expresses understanding  Denies any questions or concerns  Pt discharged home via [facility] transport
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
## 2021-01-18 NOTE — CASE MANAGEMENT
1/18/2021: AKIN did receive a call from [facility], who reported that Pt is scheduled for a virtual therapy session 1/19 at 11:30 AM   CM contacted Pt at [phone] & left a message on voicemail with this appointment information

## 2021-01-18 NOTE — CASE MANAGEMENT
CM received a call from Pt's [facility] counselor, Fer Chester, & she reported that she spoke with her supervisor regarding Pt's concerns with going back to his father's house  Fer Chester said that they called [facility] who reported they had one opening today & one anticipated opening for tomorrow  Fer Chester said that they would do the referral & see if Pt could discharge there tomorrow    CM faxed Fer Chester Pt's current medication list & psych eval, in the event it would be needed for the referral

## 2021-01-18 NOTE — CASE MANAGEMENT
CM received a phone call from [facility] of the [address] [facility] program, who reported that she wasn't able to get the application done, & asked that Pt call crisis herself & request placement at [facility]  AKIN reviewed that Pt was inpatient at the time & not in crisis  Yumi said that Pt could call  CM contacted [facility] at [phone] & spoke with Dl Greene, regarding CM doing a referral   Dl Offer reported they had not received any communication from [facility] about Pt possibly coming there & they did not currently have any openings until mid/end of next week  CM met with Pt to review & provide the crisis number to call if he wanted to explore placement further with [facility], or was in crisis  Pt had no questions about his discharge plan

## 2021-01-18 NOTE — PROGRESS NOTES
Status:Pt denies everything; no issues to report  Medication: no changes / no PRNs  D/C: Today / CM will follow-up with the [facility] program, as they were going to do a referral yesterday afternoon for Pt to be able to go to [facility]        01/15/21 4530   Team Meeting   Meeting Type Daily Rounds   Team Members Present   Team Members Present Physician;Nurse;Occupational Therapist;   Physician Team Member Dr Headley  / April Jimenez Team Member [facility] Management Team Member Iram Ohara / Donaldo Capone / Charis Rock   OT Team Member Phani   Patient/Family Present   Patient Present No   Patient's Family Present No

## 2021-01-18 NOTE — CASE MANAGEMENT
CM assisted Pt with participating in a Teams vidoe conference with Fer Chester, his counselor from [facility] reviewed the concerns the program had & that they felt it would be best that Pt discharge back to his father's house & get ICM services in place, before transitioning into his apartment  Pt visibly upset by this & reported that he would agreed to go back, however, felt that it was not the best place for him  Pt talked about his relationship with his father & how they often do not speak  Pt talked about how his father uses "baby talk" which Pt said upsets him, as he had recently learned that his ex-significant other was a pedophile  Yumi thanked Pt for sharing this information & she said that Pt has never really been open when talking with them in the past   Pt reviewed that was due to being in the home & that the aquino are thin & he didn't want his dad to hear him  Fer Chester said that she head everything that Pt had said, & she would talk to her supervisor regarding what alternative planning they could make

## 2021-01-18 NOTE — CASE MANAGEMENT
AKIN received an email from [address] [place] [address], who reported that she was still working on securing a bed at [facility] asked if Pt could possibly remain in the hospital through the weekend? AKIN reviewed that there was no medical criteria for extending the inpatient admission at this time

## 2021-02-16 ENCOUNTER — HOSPITAL ENCOUNTER (EMERGENCY)
Facility: HOSPITAL | Age: 21
Discharge: HOME | End: 2021-02-16
Attending: EMERGENCY MEDICINE | Admitting: EMERGENCY MEDICINE
Payer: COMMERCIAL

## 2021-02-16 ENCOUNTER — APPOINTMENT (EMERGENCY)
Dept: CT IMAGING | Facility: HOSPITAL | Age: 21
End: 2021-02-16
Payer: COMMERCIAL

## 2021-02-16 VITALS
SYSTOLIC BLOOD PRESSURE: 118 MMHG | BODY MASS INDEX: 35.89 KG/M2 | DIASTOLIC BLOOD PRESSURE: 67 MMHG | TEMPERATURE: 97.8 F | OXYGEN SATURATION: 99 % | HEIGHT: 65 IN | HEART RATE: 72 BPM | WEIGHT: 215.39 LBS | RESPIRATION RATE: 20 BRPM

## 2021-02-16 DIAGNOSIS — Z91.89 AT RISK FOR ALLERGIC REACTION TO MEDICATION: Primary | ICD-10-CM

## 2021-02-16 LAB
ALBUMIN SERPL BCP-MCNC: 3.4 G/DL (ref 3.5–5)
ALP SERPL-CCNC: 44 U/L (ref 46–116)
ALT SERPL W P-5'-P-CCNC: 16 U/L (ref 12–78)
ANION GAP SERPL CALCULATED.3IONS-SCNC: 10 MMOL/L (ref 4–13)
AST SERPL W P-5'-P-CCNC: 13 U/L (ref 5–45)
BASOPHILS # BLD AUTO: 0.03 THOUSANDS/ΜL (ref 0–0.1)
BASOPHILS NFR BLD AUTO: 1 % (ref 0–1)
BILIRUB DIRECT SERPL-MCNC: 0.08 MG/DL (ref 0–0.2)
BILIRUB SERPL-MCNC: 0.3 MG/DL (ref 0.2–1)
BUN SERPL-MCNC: 8 MG/DL (ref 5–25)
CALCIUM SERPL-MCNC: 8.8 MG/DL (ref 8.3–10.1)
CHLORIDE SERPL-SCNC: 104 MMOL/L (ref 100–108)
CO2 SERPL-SCNC: 27 MMOL/L (ref 21–32)
CREAT SERPL-MCNC: 0.7 MG/DL (ref 0.6–1.3)
EOSINOPHIL # BLD AUTO: 0.16 THOUSAND/ΜL (ref 0–0.61)
EOSINOPHIL NFR BLD AUTO: 3 % (ref 0–6)
ERYTHROCYTE [DISTWIDTH] IN BLOOD BY AUTOMATED COUNT: 12.3 % (ref 11.6–15.1)
GLUCOSE SERPL-MCNC: 91 MG/DL (ref 65–140)
HCT VFR BLD AUTO: 35.3 % (ref 36.5–46.1)
HGB BLD-MCNC: 10.7 G/DL (ref 12–15.4)
IMM GRANULOCYTES # BLD AUTO: 0.01 THOUSAND/UL (ref 0–0.2)
IMM GRANULOCYTES NFR BLD AUTO: 0 % (ref 0–2)
LYMPHOCYTES # BLD AUTO: 1.98 THOUSANDS/ΜL (ref 0.6–4.47)
LYMPHOCYTES NFR BLD AUTO: 33 % (ref 14–44)
MCH RBC QN AUTO: 26.8 PG (ref 26.8–34.3)
MCHC RBC AUTO-ENTMCNC: 30.3 G/DL (ref 31.4–37.4)
MCV RBC AUTO: 89 FL (ref 82–98)
MONOCYTES # BLD AUTO: 0.5 THOUSAND/ΜL (ref 0.17–1.22)
MONOCYTES NFR BLD AUTO: 8 % (ref 4–12)
NEUTROPHILS # BLD AUTO: 3.32 THOUSANDS/ΜL (ref 1.85–7.62)
NEUTS SEG NFR BLD AUTO: 55 % (ref 43–75)
NRBC BLD AUTO-RTO: 0 /100 WBCS
PLATELET # BLD AUTO: 227 THOUSANDS/UL (ref 149–390)
PMV BLD AUTO: 11.3 FL (ref 8.9–12.7)
POTASSIUM SERPL-SCNC: 4 MMOL/L (ref 3.5–5.3)
PROT SERPL-MCNC: 7.3 G/DL (ref 6.4–8.2)
RBC # BLD AUTO: 3.99 MILLION/UL (ref 3.88–5.12)
SODIUM SERPL-SCNC: 141 MMOL/L (ref 136–145)
WBC # BLD AUTO: 6 THOUSAND/UL (ref 4.31–10.16)

## 2021-02-16 PROCEDURE — 96361 HYDRATE IV INFUSION ADD-ON: CPT

## 2021-02-16 PROCEDURE — 36415 COLL VENOUS BLD VENIPUNCTURE: CPT | Performed by: EMERGENCY MEDICINE

## 2021-02-16 PROCEDURE — 85025 COMPLETE CBC W/AUTO DIFF WBC: CPT | Performed by: EMERGENCY MEDICINE

## 2021-02-16 PROCEDURE — 80076 HEPATIC FUNCTION PANEL: CPT | Performed by: EMERGENCY MEDICINE

## 2021-02-16 PROCEDURE — 70450 CT HEAD/BRAIN W/O DYE: CPT

## 2021-02-16 PROCEDURE — 99285 EMERGENCY DEPT VISIT HI MDM: CPT

## 2021-02-16 PROCEDURE — 99282 EMERGENCY DEPT VISIT SF MDM: CPT | Performed by: EMERGENCY MEDICINE

## 2021-02-16 PROCEDURE — 96374 THER/PROPH/DIAG INJ IV PUSH: CPT

## 2021-02-16 PROCEDURE — 80048 BASIC METABOLIC PNL TOTAL CA: CPT | Performed by: EMERGENCY MEDICINE

## 2021-02-16 RX ORDER — DIPHENHYDRAMINE HYDROCHLORIDE 50 MG/ML
50 INJECTION INTRAMUSCULAR; INTRAVENOUS ONCE
Status: COMPLETED | OUTPATIENT
Start: 2021-02-16 | End: 2021-02-16

## 2021-02-16 RX ADMIN — DIPHENHYDRAMINE HYDROCHLORIDE 50 MG: 50 INJECTION, SOLUTION INTRAMUSCULAR; INTRAVENOUS at 14:14

## 2021-02-16 RX ADMIN — SODIUM CHLORIDE 1000 ML: 0.9 INJECTION, SOLUTION INTRAVENOUS at 14:14

## 2021-02-17 NOTE — ED PROVIDER NOTES
History  Chief Complaint   Patient presents with    Speech Problem     Reports having anxiety attack last night, was d/c from [facility] this morning, where she was evaluated for the same  Saw therapist today who are concerend for speech problems persiting  23year old patient presents emergency department for evaluation of new onset stuttering  The patient was seen today by her therapist was concerned for this and sent her into the emergency department for evaluation  The patient is on psychiatric medications, she has been taking them as directed, do not know of this being a side effect to those medications  Signs of end-organ damage will be tested via blood work, CT scan of the head will be done as the symptoms have been ongoing for several days the patient will be given Benadryl for possible dystonic type reaction to medication  With Benadryl the patient was able to rest, there is resolution of the symptoms the patient was discharged home  History provided by:  Patient   used: No    Medical Problem  Severity:  Mild  Onset quality:  Gradual  Timing:  Constant  Progression:  Waxing and waning  Chronicity:  New  Associated symptoms: no congestion, no cough, no fever and no headaches        Prior to Admission Medications   Prescriptions Last Dose Informant Patient Reported? Taking?    ARIPiprazole (ABILIFY) 5 mg tablet   No No   Sig: Take 1 tablet (5 mg total) by mouth daily At 9am   ferrous sulfate 325 (65 Fe) mg tablet   No No   Sig: Take 1 tablet (325 mg total) by mouth daily with breakfast   prazosin (MINIPRESS) 2 mg capsule   No No   Sig: Take 1 capsule (2 mg total) by mouth daily at bedtime   sertraline (ZOLOFT) 100 mg tablet   No No   Sig: Take 1 tablet (100 mg total) by mouth daily At 9am      Facility-Administered Medications: None       Past Medical History:   Diagnosis Date    Anxiety     Depression     History of psychiatric hospitalization     Panic attack     Psychiatric disorder     Suicidal ideations     Suicide attempt ([facility] [address] ) 2016       History reviewed  No pertinent surgical history  History reviewed  No pertinent family history  I have reviewed and agree with the history as documented  E-Cigarette/Vaping    E-Cigarette Use Never User      E-Cigarette/Vaping Substances    Nicotine No     THC No     CBD No     Flavoring No     Other No     Unknown No      Social History     Tobacco Use    Smoking status: Never Smoker    Smokeless tobacco: Never Used   Substance Use Topics    Alcohol use: Yes     Frequency: 2-4 times a month     Drinks per session: 1 or 2     Binge frequency: Never    Drug use: No       Review of Systems   Constitutional: Negative for fever  HENT: Negative for congestion  Respiratory: Negative for cough  Neurological: Negative for headaches  All other systems reviewed and are negative  Physical Exam  Physical Exam  Vitals signs and nursing note reviewed  Constitutional:       General: He is not in acute distress  Appearance: He is well-developed  He is not diaphoretic  HENT:      Head: Normocephalic and atraumatic  Right Ear: External ear normal       Left Ear: External ear normal    Eyes:      General: No scleral icterus  Right eye: No discharge  Left eye: No discharge  Conjunctiva/sclera: Conjunctivae normal    Neck:      Musculoskeletal: Normal range of motion and neck supple  Thyroid: No thyromegaly  Vascular: No JVD  Trachea: No tracheal deviation  Cardiovascular:      Rate and Rhythm: Normal rate and regular rhythm  Pulmonary:      Effort: Pulmonary effort is normal  No respiratory distress  Breath sounds: Normal breath sounds  No stridor  No wheezing or rales  Abdominal:      General: Bowel sounds are normal  There is no distension  Palpations: Abdomen is soft  Tenderness: There is no abdominal tenderness     Musculoskeletal: Normal range of motion  General: No tenderness or deformity  Skin:     General: Skin is warm and dry  Neurological:      Mental Status: He is alert and oriented to person, place, and time  Cranial Nerves: No cranial nerve deficit  Coordination: Coordination normal    Psychiatric:         Behavior: Behavior normal          Vital Signs  ED Triage Vitals [02/16/21 1323]   Temperature Pulse Respirations Blood Pressure SpO2   97 8 °F (36 6 °C) 72 20 118/67 99 %      Temp Source Heart Rate Source Patient Position - Orthostatic VS BP Location FiO2 (%)   Oral Monitor Lying Right arm --      Pain Score       --           Vitals:    02/16/21 1323   BP: 118/67   Pulse: 72   Patient Position - Orthostatic VS: Lying         Visual Acuity      ED Medications  Medications   sodium chloride 0 9 % bolus 1,000 mL (0 mL Intravenous Stopped 2/16/21 1514)   diphenhydrAMINE (BENADRYL) injection 50 mg (50 mg Intravenous Given 2/16/21 1414)       Diagnostic Studies  Results Reviewed     Procedure Component Value Units Date/Time    Basic metabolic panel [[id number]] Collected: 02/16/21 1415    Lab Status: Final result Specimen: Blood from Arm, Left Updated: 02/16/21 1434     Sodium 141 mmol/L      Potassium 4 0 mmol/L      Chloride 104 mmol/L      CO2 27 mmol/L      ANION GAP 10 mmol/L      BUN 8 mg/dL      Creatinine 0 70 mg/dL      Glucose 91 mg/dL      Calcium 8 8 mg/dL      eGFR --    Narrative:      Notes:     1  eGFR calculation is only valid for adults 18 years and older  2  EGFR calculation cannot be performed for patients who are transgender, non-binary, or whose legal sex, sex at birth, and gender identity differ      Hepatic function panel [[id number]]  (Abnormal) Collected: 02/16/21 1415    Lab Status: Final result Specimen: Blood from Arm, Left Updated: 02/16/21 1434     Total Bilirubin 0 30 mg/dL      Bilirubin, Direct 0 08 mg/dL      Alkaline Phosphatase 44 U/L      AST 13 U/L      ALT 16 U/L      Total Protein 7 3 g/dL Albumin 3 4 g/dL     CBC and differential [[id number]]  (Abnormal) Collected: 02/16/21 1415    Lab Status: Final result Specimen: Blood from Arm, Left Updated: 02/16/21 1420     WBC 6 00 Thousand/uL      RBC 3 99 Million/uL      Hemoglobin 10 7 g/dL      Hematocrit 35 3 %      MCV 89 fL      MCH 26 8 pg      MCHC 30 3 g/dL      RDW 12 3 %      MPV 11 3 fL      Platelets 693 Thousands/uL      nRBC 0 /100 WBCs      Neutrophils Relative 55 %      Immat GRANS % 0 %      Lymphocytes Relative 33 %      Monocytes Relative 8 %      Eosinophils Relative 3 %      Basophils Relative 1 %      Neutrophils Absolute 3 32 Thousands/µL      Immature Grans Absolute 0 01 Thousand/uL      Lymphocytes Absolute 1 98 Thousands/µL      Monocytes Absolute 0 50 Thousand/µL      Eosinophils Absolute 0 16 Thousand/µL      Basophils Absolute 0 03 Thousands/µL                  CT head without contrast   Final Result by Jamaal Luna DO (02/16 1434)      No acute intracranial abnormality  Workstation performed: [id number]                    Procedures  Procedures         ED Course                             SBIRT 20yo+      Most Recent Value   SBIRT (22 yo +)   In order to provide better care to our patients, we are screening all of our patients for alcohol and drug use  Would it be okay to ask you these screening questions? Yes Filed at: 02/16/2021 1500   Initial Alcohol Screen: US AUDIT-C    1  How often do you have a drink containing alcohol?  0 Filed at: 02/16/2021 1500   2  How many drinks containing alcohol do you have on a typical day you are drinking? 0 Filed at: 02/16/2021 1500   3a  Male UNDER 65: How often do you have five or more drinks on one occasion? 0 Filed at: 02/16/2021 1500   Audit-C Score  0 Filed at: 02/16/2021 1500   BRENDA: How many times in the past year have you    Used an illegal drug or used a prescription medication for non-medical reasons?   Never Filed at: 02/16/2021 1500 MDM  Number of Diagnoses or Management Options  At risk for allergic reaction to medication: new and requires workup     Amount and/or Complexity of Data Reviewed  Decide to obtain previous medical records or to obtain history from someone other than the patient: yes  Review and summarize past medical records: yes    Patient Progress  Patient progress: stable      Disposition  Final diagnoses: At risk for allergic reaction to medication     Time reflects when diagnosis was documented in both MDM as applicable and the Disposition within this note     Time User Action Codes Description Comment    2/16/2021  3:30 PM Radha Villafuerte [Z91 89] At risk for allergic reaction to medication       ED Disposition     ED Disposition Condition Date/Time Comment    Discharge Stable Tue Feb 16, 2021  3:30 PM Porsha Mackyane discharge to home/self care  Follow-up Information     Follow up With Specialties Details Why Contact Info    Apryl Rodríguez MD Pediatrics   [address] [place] [address]  N  [phone]            Discharge Medication List as of 2/16/2021  3:32 PM      CONTINUE these medications which have NOT CHANGED    Details   ARIPiprazole (ABILIFY) 5 mg tablet Take 1 tablet (5 mg total) by mouth daily At 9am, Starting Fri 1/15/2021, Print      ferrous sulfate 325 (65 Fe) mg tablet Take 1 tablet (325 mg total) by mouth daily with breakfast, Starting Fri 1/15/2021, Print      prazosin (MINIPRESS) 2 mg capsule Take 1 capsule (2 mg total) by mouth daily at bedtime, Starting Fri 1/15/2021, Print      sertraline (ZOLOFT) 100 mg tablet Take 1 tablet (100 mg total) by mouth daily At 9am, Starting Fri 1/15/2021, Print           No discharge procedures on file      PDMP Review     None          ED Provider  Electronically Signed by           Clement Mejia DO  02/17/21 6492

## 2021-06-03 ENCOUNTER — APPOINTMENT (EMERGENCY)
Dept: CT IMAGING | Facility: HOSPITAL | Age: 21
End: 2021-06-03
Payer: COMMERCIAL

## 2021-06-03 ENCOUNTER — HOSPITAL ENCOUNTER (EMERGENCY)
Facility: HOSPITAL | Age: 21
Discharge: HOME | End: 2021-06-03
Attending: EMERGENCY MEDICINE | Admitting: EMERGENCY MEDICINE
Payer: COMMERCIAL

## 2021-06-03 VITALS
TEMPERATURE: 98.3 F | OXYGEN SATURATION: 100 % | RESPIRATION RATE: 16 BRPM | DIASTOLIC BLOOD PRESSURE: 71 MMHG | HEART RATE: 68 BPM | SYSTOLIC BLOOD PRESSURE: 114 MMHG

## 2021-06-03 DIAGNOSIS — R82.81 PYURIA: ICD-10-CM

## 2021-06-03 DIAGNOSIS — R11.0 NAUSEA: ICD-10-CM

## 2021-06-03 DIAGNOSIS — R10.9 ABDOMINAL PAIN: Primary | ICD-10-CM

## 2021-06-03 LAB
ALBUMIN SERPL BCP-MCNC: 3.9 G/DL (ref 3.5–5)
ALP SERPL-CCNC: 52 U/L (ref 46–116)
ALT SERPL W P-5'-P-CCNC: 23 U/L (ref 12–78)
ANION GAP SERPL CALCULATED.3IONS-SCNC: 8 MMOL/L (ref 4–13)
AST SERPL W P-5'-P-CCNC: 20 U/L (ref 5–45)
BACTERIA UR QL AUTO: NORMAL /HPF
BASOPHILS # BLD AUTO: 0.03 THOUSANDS/ΜL (ref 0–0.1)
BASOPHILS NFR BLD AUTO: 0 % (ref 0–1)
BILIRUB DIRECT SERPL-MCNC: 0.09 MG/DL (ref 0–0.2)
BILIRUB SERPL-MCNC: 0.48 MG/DL (ref 0.2–1)
BILIRUB UR QL STRIP: NEGATIVE
BUN SERPL-MCNC: 9 MG/DL (ref 5–25)
CALCIUM SERPL-MCNC: 8.9 MG/DL (ref 8.3–10.1)
CHLORIDE SERPL-SCNC: 103 MMOL/L (ref 100–108)
CLARITY UR: CLEAR
CO2 SERPL-SCNC: 29 MMOL/L (ref 21–32)
COLOR UR: YELLOW
CREAT SERPL-MCNC: 0.73 MG/DL (ref 0.6–1.3)
EOSINOPHIL # BLD AUTO: 0.13 THOUSAND/ΜL (ref 0–0.61)
EOSINOPHIL NFR BLD AUTO: 2 % (ref 0–6)
ERYTHROCYTE [DISTWIDTH] IN BLOOD BY AUTOMATED COUNT: 12.7 % (ref 11.6–15.1)
GLUCOSE SERPL-MCNC: 78 MG/DL (ref 65–140)
GLUCOSE UR STRIP-MCNC: NEGATIVE MG/DL
HCG SERPL QL: NEGATIVE
HCT VFR BLD AUTO: 36.9 % (ref 36.5–46.1)
HGB BLD-MCNC: 11.2 G/DL (ref 12–15.4)
HGB UR QL STRIP.AUTO: NEGATIVE
IMM GRANULOCYTES # BLD AUTO: 0.02 THOUSAND/UL (ref 0–0.2)
IMM GRANULOCYTES NFR BLD AUTO: 0 % (ref 0–2)
KETONES UR STRIP-MCNC: ABNORMAL MG/DL
LACTATE SERPL-SCNC: 0.5 MMOL/L (ref 0.5–2)
LEUKOCYTE ESTERASE UR QL STRIP: ABNORMAL
LIPASE SERPL-CCNC: 106 U/L (ref 73–393)
LYMPHOCYTES # BLD AUTO: 2.6 THOUSANDS/ΜL (ref 0.6–4.47)
LYMPHOCYTES NFR BLD AUTO: 36 % (ref 14–44)
MCH RBC QN AUTO: 26.3 PG (ref 26.8–34.3)
MCHC RBC AUTO-ENTMCNC: 30.4 G/DL (ref 31.4–37.4)
MCV RBC AUTO: 87 FL (ref 82–98)
MONOCYTES # BLD AUTO: 0.42 THOUSAND/ΜL (ref 0.17–1.22)
MONOCYTES NFR BLD AUTO: 6 % (ref 4–12)
NEUTROPHILS # BLD AUTO: 3.95 THOUSANDS/ΜL (ref 1.85–7.62)
NEUTS SEG NFR BLD AUTO: 56 % (ref 43–75)
NITRITE UR QL STRIP: NEGATIVE
NON-SQ EPI CELLS URNS QL MICRO: NORMAL /HPF
NRBC BLD AUTO-RTO: 0 /100 WBCS
PH UR STRIP.AUTO: 6.5 [PH]
PLATELET # BLD AUTO: 258 THOUSANDS/UL (ref 149–390)
PMV BLD AUTO: 11.3 FL (ref 8.9–12.7)
POTASSIUM SERPL-SCNC: 3.6 MMOL/L (ref 3.5–5.3)
PROT SERPL-MCNC: 8.3 G/DL (ref 6.4–8.2)
PROT UR STRIP-MCNC: NEGATIVE MG/DL
RBC # BLD AUTO: 4.26 MILLION/UL (ref 3.88–5.12)
RBC #/AREA URNS AUTO: NORMAL /HPF
SODIUM SERPL-SCNC: 140 MMOL/L (ref 136–145)
SP GR UR STRIP.AUTO: <=1.005 (ref 1–1.03)
UROBILINOGEN UR QL STRIP.AUTO: 0.2 E.U./DL
WBC # BLD AUTO: 7.15 THOUSAND/UL (ref 4.31–10.16)
WBC #/AREA URNS AUTO: NORMAL /HPF

## 2021-06-03 PROCEDURE — 74177 CT ABD & PELVIS W/CONTRAST: CPT

## 2021-06-03 PROCEDURE — 99284 EMERGENCY DEPT VISIT MOD MDM: CPT

## 2021-06-03 PROCEDURE — 84703 CHORIONIC GONADOTROPIN ASSAY: CPT | Performed by: EMERGENCY MEDICINE

## 2021-06-03 PROCEDURE — 36415 COLL VENOUS BLD VENIPUNCTURE: CPT | Performed by: EMERGENCY MEDICINE

## 2021-06-03 PROCEDURE — 83605 ASSAY OF LACTIC ACID: CPT | Performed by: EMERGENCY MEDICINE

## 2021-06-03 PROCEDURE — 85025 COMPLETE CBC W/AUTO DIFF WBC: CPT | Performed by: EMERGENCY MEDICINE

## 2021-06-03 PROCEDURE — 83690 ASSAY OF LIPASE: CPT | Performed by: EMERGENCY MEDICINE

## 2021-06-03 PROCEDURE — 81001 URINALYSIS AUTO W/SCOPE: CPT | Performed by: EMERGENCY MEDICINE

## 2021-06-03 PROCEDURE — 99284 EMERGENCY DEPT VISIT MOD MDM: CPT | Performed by: EMERGENCY MEDICINE

## 2021-06-03 PROCEDURE — 80076 HEPATIC FUNCTION PANEL: CPT | Performed by: EMERGENCY MEDICINE

## 2021-06-03 PROCEDURE — 80048 BASIC METABOLIC PNL TOTAL CA: CPT | Performed by: EMERGENCY MEDICINE

## 2021-06-03 RX ORDER — DICYCLOMINE HCL 20 MG
20 TABLET ORAL EVERY 6 HOURS PRN
Qty: 20 TABLET | Refills: 0 | Status: SHIPPED | OUTPATIENT
Start: 2021-06-03 | End: 2021-06-20

## 2021-06-03 RX ORDER — ONDANSETRON 4 MG/1
4 TABLET, ORALLY DISINTEGRATING ORAL EVERY 6 HOURS PRN
Qty: 20 TABLET | Refills: 0 | Status: SHIPPED | OUTPATIENT
Start: 2021-06-03

## 2021-06-03 RX ORDER — CEPHALEXIN 500 MG/1
500 CAPSULE ORAL 3 TIMES DAILY
Qty: 15 CAPSULE | Refills: 0 | Status: SHIPPED | OUTPATIENT
Start: 2021-06-03 | End: 2021-06-08

## 2021-06-03 RX ORDER — KETOROLAC TROMETHAMINE 30 MG/ML
15 INJECTION, SOLUTION INTRAMUSCULAR; INTRAVENOUS ONCE
Status: DISCONTINUED | OUTPATIENT
Start: 2021-06-03 | End: 2021-06-03 | Stop reason: HOSPADM

## 2021-06-03 RX ADMIN — SODIUM CHLORIDE 1000 ML: 0.9 INJECTION, SOLUTION INTRAVENOUS at 18:40

## 2021-06-03 RX ADMIN — IOHEXOL 100 ML: 350 INJECTION, SOLUTION INTRAVENOUS at 19:21

## 2021-06-03 NOTE — ED PROVIDER NOTES
History  Chief Complaint   Patient presents with    Abdominal Pain     Pt reports dull lower abdominal pain for the last couple of hours that radiates to her back        History provided by:  Patient   used: No    Abdominal Pain  Pain location:  LLQ and L flank  Pain quality: dull and sharp    Pain radiates to:  Back  Pain severity:  Moderate  Onset quality:  Gradual  Timing:  Constant  Progression:  Waxing and waning  Chronicity:  New  Relieved by:  Nothing  Worsened by:  Nothing  Ineffective treatments:  None tried  Associated symptoms: no chest pain, no chills, no cough, no dysuria, no fever, no hematuria, no nausea, no shortness of breath, no sore throat and no vomiting        Prior to Admission Medications   Prescriptions Last Dose Informant Patient Reported? Taking? ARIPiprazole (ABILIFY) 5 mg tablet   No No   Sig: Take 1 tablet (5 mg total) by mouth daily At 9am   ferrous sulfate 325 (65 Fe) mg tablet   No No   Sig: Take 1 tablet (325 mg total) by mouth daily with breakfast   prazosin (MINIPRESS) 2 mg capsule   No No   Sig: Take 1 capsule (2 mg total) by mouth daily at bedtime   sertraline (ZOLOFT) 100 mg tablet   No No   Sig: Take 1 tablet (100 mg total) by mouth daily At 9am      Facility-Administered Medications: None       Past Medical History:   Diagnosis Date    Anxiety     Depression     History of psychiatric hospitalization     Panic attack     Psychiatric disorder     Suicidal ideations     [facility]icide attempt [facility] [address] ) 2016       History reviewed  No pertinent surgical history  History reviewed  No pertinent family history  I have reviewed and agree with the history as documented      E-Cigarette/Vaping    E-Cigarette Use Never User      E-Cigarette/Vaping Substances    Nicotine No     THC No     CBD No     Flavoring No     Other No     Unknown No      Social History     Tobacco Use    Smoking status: Never Smoker    Smokeless tobacco: Never Used   Substance Use Topics    Alcohol use: Yes     Frequency: 2-4 times a month     Drinks per session: 1 or 2     Binge frequency: Never    Drug use: No       Review of Systems   Constitutional: Negative for chills and fever  HENT: Negative for ear pain and sore throat  Eyes: Negative for pain and visual disturbance  Respiratory: Negative for cough and shortness of breath  Cardiovascular: Negative for chest pain and palpitations  Gastrointestinal: Positive for abdominal pain  Negative for nausea and vomiting  Genitourinary: Negative for dysuria and hematuria  Musculoskeletal: Negative for arthralgias and back pain  Skin: Negative for color change and rash  Neurological: Negative for seizures and syncope  All other systems reviewed and are negative  Physical Exam  Physical Exam  Vitals signs reviewed  Constitutional:       Appearance: He is well-developed  He is not diaphoretic  HENT:      Head: Normocephalic  Eyes:      General: No scleral icterus  Conjunctiva/sclera: Conjunctivae normal       Pupils: Pupils are equal, round, and reactive to light  Neck:      Musculoskeletal: Normal range of motion and neck supple  Vascular: No JVD  Cardiovascular:      Rate and Rhythm: Normal rate and regular rhythm  Abdominal:      General: Bowel sounds are normal  There is no distension  Palpations: Abdomen is soft  Tenderness: There is abdominal tenderness  There is no guarding or rebound  Comments: LLQ tenderness w/o r/g  Musculoskeletal: Normal range of motion  General: No tenderness or deformity  Lymphadenopathy:      Cervical: No cervical adenopathy  Skin:     General: Skin is warm and dry  Capillary Refill: Capillary refill takes less than 2 seconds  Neurological:      Mental Status: He is alert and oriented to person, place, and time        Coordination: Coordination normal          Vital Signs  ED Triage Vitals   Temperature Pulse Respirations Blood Pressure SpO2   06/03/21 1725 06/03/21 1725 06/03/21 1725 06/03/21 1725 06/03/21 1725   98 3 °F (36 8 °C) 79 18 112/66 99 %      Temp Source Heart Rate Source Patient Position - Orthostatic VS BP Location FiO2 (%)   06/03/21 1725 06/03/21 1725 06/03/21 1845 06/03/21 1725 --   Oral Monitor Sitting Left arm       Pain Score       --                  Vitals:    06/03/21 1725 06/03/21 1845 06/03/21 2000 06/03/21 2030   BP: 112/66 124/76 120/77 114/71   Pulse: 79 72 75 68   Patient Position - Orthostatic VS:  Sitting Lying Lying         Visual Acuity      ED Medications  Medications   ketorolac (TORADOL) injection 15 mg (0 mg Intravenous Hold 6/3/21 2038)   sodium chloride 0 9 % bolus 1,000 mL (0 mL Intravenous Stopped 6/3/21 1900)   iohexol (OMNIPAQUE) 350 MG/ML injection (SINGLE-DOSE) 100 mL (100 mL Intravenous Given 6/3/21 1921)       Diagnostic Studies  Results Reviewed     Procedure Component Value Units Date/Time    Urine Microscopic [[id number]]  (Normal) Collected: 06/03/21 1839    Lab Status: Final result Specimen: Urine, Clean Catch Updated: 06/03/21 1917     RBC, UA None Seen /hpf      WBC, UA 2-4 /hpf      Epithelial Cells Occasional /hpf      Bacteria, UA Occasional /hpf     Hepatic function panel [[id number]]  (Abnormal) Collected: 06/03/21 1838    Lab Status: Final result Specimen: Blood from Arm, Right Updated: 06/03/21 1909     Total Bilirubin 0 48 mg/dL      Bilirubin, Direct 0 09 mg/dL      Alkaline Phosphatase 52 U/L      AST 20 U/L      ALT 23 U/L      Total Protein 8 3 g/dL      Albumin 3 9 g/dL     Lipase [[id number]]  (Normal) Collected: 06/03/21 1838    Lab Status: Final result Specimen: Blood from Arm, Right Updated: 06/03/21 1909     Lipase 106 u/L     hCG, qualitative pregnancy [[id number]]  (Normal) Collected: 06/03/21 1838    Lab Status: Final result Specimen: Blood from Arm, Right Updated: 06/03/21 1909     Preg, Serum Negative    Lactic acid [[id number]]  (Normal) Collected: 06/03/21 2907 Lab Status: Final result Specimen: Blood from Arm, Right Updated: 06/03/21 1905     LACTIC ACID 0 5 mmol/L     Narrative:      Result may be elevated if tourniquet was used during collection  Basic metabolic panel [[id number]] Collected: 06/03/21 1838    Lab Status: Final result Specimen: Blood from Arm, Right Updated: 06/03/21 1903     Sodium 140 mmol/L      Potassium 3 6 mmol/L      Chloride 103 mmol/L      CO2 29 mmol/L      ANION GAP 8 mmol/L      BUN 9 mg/dL      Creatinine 0 73 mg/dL      Glucose 78 mg/dL      Calcium 8 9 mg/dL      eGFR --    Narrative:      Notes:     1  eGFR calculation is only valid for adults 18 years and older  2  EGFR calculation cannot be performed for patients who are transgender, non-binary, or whose legal sex, sex at birth, and gender identity differ      UA (URINE) with reflex to Scope [[id number]]  (Abnormal) Collected: 06/03/21 1839    Lab Status: Final result Specimen: Urine, Clean Catch Updated: 06/03/21 1853     Color, UA Yellow     Clarity, UA Clear     Specific Gravity, UA <=1 005     pH, UA 6 5     Leukocytes, UA Trace     Nitrite, UA Negative     Protein, UA Negative mg/dl      Glucose, UA Negative mg/dl      Ketones, UA Trace mg/dl      Urobilinogen, UA 0 2 E U /dl      Bilirubin, UA Negative     Blood, UA Negative    CBC and differential [[id number]]  (Abnormal) Collected: 06/03/21 1838    Lab Status: Final result Specimen: Blood from Arm, Right Updated: 06/03/21 1851     WBC 7 15 Thousand/uL      RBC 4 26 Million/uL      Hemoglobin 11 2 g/dL      Hematocrit 36 9 %      MCV 87 fL      MCH 26 3 pg      MCHC 30 4 g/dL      RDW 12 7 %      MPV 11 3 fL      Platelets 878 Thousands/uL      nRBC 0 /100 WBCs      Neutrophils Relative 56 %      Immat GRANS % 0 %      Lymphocytes Relative 36 %      Monocytes Relative 6 %      Eosinophils Relative 2 %      Basophils Relative 0 %      Neutrophils Absolute 3 95 Thousands/µL      Immature Grans Absolute 0 02 Thousand/uL Lymphocytes Absolute 2 60 Thousands/µL      Monocytes Absolute 0 42 Thousand/µL      Eosinophils Absolute 0 13 Thousand/µL      Basophils Absolute 0 03 Thousands/µL                  CT abdomen pelvis with contrast   Final Result by Josselyn Hammond MD (06/03 1955)      No acute CT findings  Workstation performed: [id number]                    Procedures  Procedures         ED Course                             SBIRT 22yo+      Most Recent Value   SBIRT (25 yo +)   In order to provide better care to our patients, we are screening all of our patients for alcohol and drug use  Would it be okay to ask you these screening questions? Yes Filed at: 06/03/2021 1827   Initial Alcohol Screen: US AUDIT-C    1  How often do you have a drink containing alcohol?  0 Filed at: 06/03/2021 1827   2  How many drinks containing alcohol do you have on a typical day you are drinking? 0 Filed at: 06/03/2021 1827   3b  FEMALE Any Age, or MALE 65+: How often do you have 4 or more drinks on one occassion? 0 Filed at: 06/03/2021 1827   Audit-C Score  0 Filed at: 06/03/2021 1827   BRENDA: How many times in the past year have you    Used an illegal drug or used a prescription medication for non-medical reasons?   Never Filed at: 06/03/2021 1827                    MDM  Number of Diagnoses or Management Options  Abdominal pain: new and requires workup  Nausea: new and requires workup  Pyuria: new and requires workup     Amount and/or Complexity of Data Reviewed  Clinical lab tests: ordered and reviewed  Tests in the radiology section of CPT®: reviewed and ordered  Review and summarize past medical records: yes  Independent visualization of images, tracings, or specimens: yes        Disposition  Final diagnoses:   Abdominal pain   Pyuria   Nausea     Time reflects when diagnosis was documented in both MDM as applicable and the Disposition within this note     Time User Action Codes Description Comment    6/3/2021  8:34 PM Debra De La Paz Add [R10 9] Abdominal pain     6/3/2021  8:35 PM Burlene Bridegroom Add [R82 81] Pyuria     6/3/2021  8:35 PM Burlene Bridegroom Add [R11 0] Nausea       ED Disposition     ED Disposition Condition Date/Time Comment    Discharge Stable Thu Porter 3, 2021  8:33 PM Zara Siemens discharge to home/self care  Follow-up Information     Follow up With Specialties Details Why Contact Info    Tapan Tao MD Pediatrics   [facility] [place] [address]  N  [phone]            Discharge Medication List as of 6/3/2021  8:36 PM      START taking these medications    Details   cephalexin (KEFLEX) 500 mg capsule Take 1 capsule (500 mg total) by mouth 3 (three) times a day for 5 days, Starting u 6/3/2021, Until Tue 6/8/2021, Print      dicyclomine (BENTYL) 20 mg tablet Take 1 tablet (20 mg total) by mouth every 6 (six) hours as needed (abd pain/cramping) for up to 5 days, Starting Thu 6/3/2021, Until Tue 6/8/2021, Print      ondansetron (ZOFRAN-ODT) 4 mg disintegrating tablet Take 1 tablet (4 mg total) by mouth every 6 (six) hours as needed for nausea or vomiting, Starting u 6/3/2021, Print         CONTINUE these medications which have NOT CHANGED    Details   ARIPiprazole (ABILIFY) 5 mg tablet Take 1 tablet (5 mg total) by mouth daily At 9am, Starting Fri 1/15/2021, Print      ferrous sulfate 325 (65 Fe) mg tablet Take 1 tablet (325 mg total) by mouth daily with breakfast, Starting Fri 1/15/2021, Print      prazosin (MINIPRESS) 2 mg capsule Take 1 capsule (2 mg total) by mouth daily at bedtime, Starting Fri 1/15/2021, Print      sertraline (ZOLOFT) 100 mg tablet Take 1 tablet (100 mg total) by mouth daily At 9am, Starting Fri 1/15/2021, Print           No discharge procedures on file      PDMP Review     None          ED Provider  Electronically Signed by           Manny Burgos MD  06/03/21 4002

## 2021-06-03 NOTE — Clinical Note
Lorenzo Saul was seen and treated in our emergency department on 6/3/2021  Diagnosis:     Eunice Lazcano  may return to work on return date  He may return on this date: 06/07/2021         If you have any questions or concerns, please don't hesitate to call        Wilian Castañeda MD    ______________________________           _______________          _______________  Hospital Representative                              Date                                Time

## 2021-06-04 NOTE — ED NOTES
Patient walked out of department stable      [facility]  06/03/21 2051

## 2021-06-20 ENCOUNTER — HOSPITAL ENCOUNTER (EMERGENCY)
Facility: HOSPITAL | Age: 21
End: 2021-06-21
Attending: EMERGENCY MEDICINE | Admitting: EMERGENCY MEDICINE
Payer: COMMERCIAL

## 2021-06-20 DIAGNOSIS — F32.A DEPRESSION WITH SUICIDAL IDEATION: Primary | ICD-10-CM

## 2021-06-20 DIAGNOSIS — R45.851 DEPRESSION WITH SUICIDAL IDEATION: Primary | ICD-10-CM

## 2021-06-20 PROCEDURE — 99285 EMERGENCY DEPT VISIT HI MDM: CPT

## 2021-06-20 PROCEDURE — 80307 DRUG TEST PRSMV CHEM ANLYZR: CPT | Performed by: EMERGENCY MEDICINE

## 2021-06-20 PROCEDURE — 82075 ASSAY OF BREATH ETHANOL: CPT | Performed by: EMERGENCY MEDICINE

## 2021-06-20 PROCEDURE — U0005 INFEC AGEN DETEC AMPLI PROBE: HCPCS | Performed by: EMERGENCY MEDICINE

## 2021-06-20 PROCEDURE — U0003 INFECTIOUS AGENT DETECTION BY NUCLEIC ACID (DNA OR RNA); SEVERE ACUTE RESPIRATORY SYNDROME CORONAVIRUS 2 (SARS-COV-2) (CORONAVIRUS DISEASE [COVID-19]), AMPLIFIED PROBE TECHNIQUE, MAKING USE OF HIGH THROUGHPUT TECHNOLOGIES AS DESCRIBED BY CMS-2020-01-R: HCPCS | Performed by: EMERGENCY MEDICINE

## 2021-06-20 PROCEDURE — 81025 URINE PREGNANCY TEST: CPT | Performed by: EMERGENCY MEDICINE

## 2021-06-20 PROCEDURE — 99285 EMERGENCY DEPT VISIT HI MDM: CPT | Performed by: EMERGENCY MEDICINE

## 2021-06-20 NOTE — Clinical Note
Santos Patel should be transferred out to [facility] placement pending) and has been medically cleared

## 2021-06-20 NOTE — ED NOTES
Patient agrees to [address] facility or other facility one-two hours away  Patient gives permission for [employer] therapist to be provided with updates

## 2021-06-20 NOTE — ED NOTES
Patient presents due to passive suicidal ideation, increased anxiety and depression and not having medication because it was not filled  [facility] met with patient when medically cleared  Patient is transgender female who identifies as male and is calm and cooperative  Patient experiencing passive suicidal ideation, increased depression and anxiety, racing thoughts, and decreased sleep  Patient also experiencing hopelessness and helplessness  Patient denies homicidal ideation, auditory and visual hallucinations, and delusions  Patient identifies stressors of running out of medication because it wasn't filled, being treated as incompetent at work, decreased sleep, and stuttering from stress  Patient also reports loss of mother in 2003 resulting in father isolating her from siblings  Patient has been diagnosed with Asperger's Syndrome, PTSD, anxiety and Major Depressive Disorder  Patient states that her peers and [employer] are her support sytem  Patient states that she sometimes forgets what she is doing  Patient has outpatient services and history of inpatient behavioral health treatment  Patient unable to contract for safety at lower level of care and agrees to sign voluntary 201  Patient rights and 72 hour written notice were explained with copies on chart  Provider in agreement with inpatient behavioral health treatment

## 2021-06-20 NOTE — ED PROVIDER NOTES
History  Chief Complaint   Patient presents with    Psychiatric Evaluation     Pt c/o depression, anxiety and SI toughts with no plan  Patient is a 25-year-old transgender female to male, with medical history of Asperger's syndrome, anxiety, major depression, PTSD, history of prior psychiatric hospitalization and prior suicide attempt as a teenager, currently living alone, presents to the emergency department for several weeks of worsening anxiety, depression and suicidal ideation  Patient denies a particular plan for suicide  She reports she has not been sleeping well at all over the past several weeks  She did states she ran out of her prazosin which has made her insomnia worse  She follows with a therapist weekly as well as a psychiatrist however she only does phone visits to the psychiatrist currently  She denies any active auditory or visual hallucinations or any homicidal ideation  Other than sleep disturbance she denies any other physical symptoms  Rest of review of systems is negative  No fever, chills, headache, dizziness or near syncope, cough, URI symptoms, chest pain, palpitations, dyspnea, abdominal pain, nausea vomiting, change in bowel habits, urinary symptoms, skin rash or color change, extremity weakness or paresthesia or other focal neurologic deficits  History provided by:  Patient   used: No    Psychiatric Evaluation  Presenting symptoms: suicidal thoughts    Presenting symptoms: no hallucinations    Associated symptoms: anxiety    Associated symptoms: no abdominal pain, no chest pain and no headaches        Prior to Admission Medications   Prescriptions Last Dose Informant Patient Reported? Taking?    ARIPiprazole (ABILIFY) 5 mg tablet Not Taking at Unknown time Self No No   Sig: Take 1 tablet (5 mg total) by mouth daily At 9am   Patient not taking: Reported on 6/20/2021   ferrous sulfate 325 (65 Fe) mg tablet More than a month at Unknown time Self No No Sig: Take 1 tablet (325 mg total) by mouth daily with breakfast   ondansetron (ZOFRAN-ODT) 4 mg disintegrating tablet Not Taking at Unknown time Self No No   Sig: Take 1 tablet (4 mg total) by mouth every 6 (six) hours as needed for nausea or vomiting   Patient not taking: Reported on 6/20/2021   prazosin (MINIPRESS) 2 mg capsule More than a month at Unknown time Self No No   Sig: Take 1 capsule (2 mg total) by mouth daily at bedtime   sertraline (ZOLOFT) 100 mg tablet Past Week at Unknown time Self No Yes   Sig: Take 1 tablet (100 mg total) by mouth daily At 9am      Facility-Administered Medications: None       Past Medical History:   Diagnosis Date    Anxiety     Depression     History of psychiatric hospitalization     Panic attack     Psychiatric disorder     Suicidal ideations     Suicide attempt ([facility] [address] ) 2016       History reviewed  No pertinent surgical history  History reviewed  No pertinent family history  I have reviewed and agree with the history as documented  E-Cigarette/Vaping    E-Cigarette Use Never User      E-Cigarette/Vaping Substances    Nicotine No     THC No     CBD No     Flavoring No     Other No     Unknown No      Social History     Tobacco Use    Smoking status: Never Smoker    Smokeless tobacco: Never Used   Vaping Use    Vaping Use: Never used   Substance Use Topics    Alcohol use: Yes    Drug use: No       Review of Systems   Constitutional: Negative for chills and fever  HENT: Negative for congestion, ear pain, rhinorrhea and sore throat  Respiratory: Negative for cough, chest tightness, shortness of breath and wheezing  Cardiovascular: Negative for chest pain and palpitations  Gastrointestinal: Negative for abdominal pain, constipation, diarrhea, nausea and vomiting  Genitourinary: Negative for dysuria, flank pain, frequency and hematuria  Musculoskeletal: Negative for back pain and neck pain     Skin: Negative for color change, pallor, rash and wound  Allergic/Immunologic: Negative for immunocompromised state  Neurological: Negative for dizziness, weakness, light-headedness, numbness and headaches  Hematological: Negative for adenopathy  Psychiatric/Behavioral: Positive for decreased concentration, dysphoric mood, sleep disturbance and suicidal ideas  Negative for confusion and hallucinations  The patient is nervous/anxious  All other systems reviewed and are negative  Physical Exam  Physical Exam  Vitals and nursing note reviewed  Constitutional:       General: He is not in acute distress  Appearance: Normal appearance  He is well-developed  He is not ill-appearing, toxic-appearing or diaphoretic  HENT:      Head: Normocephalic and atraumatic  Right Ear: External ear normal       Left Ear: External ear normal       Mouth/Throat:      Comments: Orpharyngeal exam deferred at this time due to risk of exposure to COVID-19 during current pandemic  Patient has no oropharyngeal complaints  Eyes:      Extraocular Movements: Extraocular movements intact  Conjunctiva/sclera: Conjunctivae normal    Neck:      Vascular: No JVD  Cardiovascular:      Rate and Rhythm: Normal rate and regular rhythm  Pulses: Normal pulses  Heart sounds: Normal heart sounds  No murmur heard  No friction rub  No gallop  Pulmonary:      Effort: Pulmonary effort is normal  No respiratory distress  Breath sounds: Normal breath sounds  No wheezing, rhonchi or rales  Abdominal:      General: There is no distension  Palpations: Abdomen is soft  There is no mass  Tenderness: There is no abdominal tenderness  There is no guarding or rebound  Musculoskeletal:         General: No swelling or deformity  Normal range of motion  Cervical back: Normal range of motion and neck supple  No rigidity  Skin:     General: Skin is warm and dry  Coloration: Skin is not pale  Findings: No erythema or rash     Neurological: General: No focal deficit present  Mental Status: He is alert and oriented to person, place, and time  Sensory: No sensory deficit  Motor: No weakness  Psychiatric:         Attention and Perception: Attention normal          Mood and Affect: Mood is depressed  Affect is blunt  Speech: Speech is rapid and pressured  Behavior: Behavior normal  Behavior is cooperative  Thought Content: Thought content includes suicidal ideation  Thought content does not include homicidal ideation  Thought content does not include homicidal or suicidal plan  Vital Signs  ED Triage Vitals   Temperature Pulse Respirations Blood Pressure SpO2   06/20/21 1130 06/20/21 1130 06/20/21 1130 06/20/21 1130 06/20/21 1130   97 6 °F (36 4 °C) 93 22 123/74 99 %      Temp Source Heart Rate Source Patient Position - Orthostatic VS BP Location FiO2 (%)   06/20/21 1130 06/20/21 1130 06/20/21 1530 06/20/21 1530 --   Temporal Monitor Sitting Right arm       Pain Score       06/20/21 1130       No Pain         Vitals:    06/20/21 1530 06/20/21 2336 06/21/21 0000 06/21/21 1104   BP: 134/77 113/60 122/64 103/56   BP Location: Right arm  Right arm Left arm   Pulse: 75 71 68 63   Resp: 18 18 20 18   Temp:    98 2 °F (36 8 °C)   TempSrc:    Oral   SpO2: 99% 100% 100% 100%   Weight:   95 3 kg (210 lb)    Height:           Visual Acuity      ED Medications  Medications - No data to display    Diagnostic Studies  Results Reviewed     Procedure Component Value Units Date/Time    Novel Coronavirus (Covid-19),PCR [facility] - 2 Hour Stat [[id number]]  (Normal) Collected: 06/20/21 1347    Lab Status: Final result Specimen: Nares from Nasopharyngeal Swab Updated: 06/20/21 1501     SARS-CoV-2 Negative    Narrative:       The specimen collection materials, transport medium, and/or testing methodology utilized in the production of these test results have been proven to be reliable in a limited validation with an abbreviated program under the Emergency Utilization Authorization provided by the FDA  Testing reported as "Presumptive positive" will be confirmed with secondary testing to ensure result accuracy  Clinical caution and judgement should be used with the interpretation of these results with consideration of the clinical impression and other laboratory testing  Testing reported as "Positive" or "Negative" has been proven to be accurate according to standard laboratory validation requirements  All testing is performed with control materials showing appropriate reactivity at standard intervals  Rapid drug screen, urine [[id number]]  (Normal) Collected: 06/20/21 1347    Lab Status: Final result Specimen: Urine, Clean Catch Updated: 06/20/21 1416     Amph/Meth UR Negative     Barbiturate Ur Negative     Benzodiazepine Urine Negative     Cocaine Urine Negative     Methadone Urine Negative     Opiate Urine Negative     PCP Ur Negative     THC Urine Negative     Oxycodone Urine Negative    Narrative:      FOR MEDICAL PURPOSES ONLY  IF CONFIRMATION NEEDED PLEASE CONTACT THE LAB WITHIN 5 DAYS  Drug Screen Cutoff Levels:  AMPHETAMINE/METHAMPHETAMINES  1000 ng/mL  BARBITURATES     200 ng/mL  BENZODIAZEPINES     200 ng/mL  COCAINE      300 ng/mL  METHADONE      300 ng/mL  OPIATES      300 ng/mL  PHENCYCLIDINE     25 ng/mL  THC       50 ng/mL  OXYCODONE      100 ng/mL    POCT pregnancy, urine [[id number]]  (Normal) Resulted: 06/20/21 1348    Lab Status: Final result Updated: 06/20/21 1348     EXT PREG TEST UR (Ref: Negative) negative     Control valid    POCT alcohol breath test [[id number]]  (Normal) Resulted: 06/20/21 1321    Lab Status: Final result Updated: 06/20/21 1321     EXTBreath Alcohol 0 000                 No orders to display              Procedures  Procedures         ED Course  ED Course as of Jun 28 1100   [place] Jun 20, 2021   1314 ED crisis worker evaluated patient and patient is willing to sign in voluntarily        [place] Patient signed 12  SBIRT 22yo+      Most Recent Value   SBIRT (22 yo +)   In order to provide better care to our patients, we are screening all of our patients for alcohol and drug use  Would it be okay to ask you these screening questions? Yes Filed at: 06/21/2021 0028   Initial Alcohol Screen: US AUDIT-C    1  How often do you have a drink containing alcohol?  0 Filed at: 06/21/2021 0028   2  How many drinks containing alcohol do you have on a typical day you are drinking? 0 Filed at: 06/21/2021 0028   3a  Male UNDER 65: How often do you have five or more drinks on one occasion? 0 Filed at: 06/21/2021 0028   3b  FEMALE Any Age, or MALE 65+: How often do you have 4 or more drinks on one occassion? 0 Filed at: 06/21/2021 0028   Audit-C Score  0 Filed at: 06/21/2021 0028   BRENDA: How many times in the past year have you    Used an illegal drug or used a prescription medication for non-medical reasons? Never Filed at: 06/21/2021 0028                    MDM  Number of Diagnoses or Management Options  Diagnosis management comments: 60-year-old transgender female to male presents to the ED for worsening depression, anxiety, PTSD and increasing suicidality over the past several weeks  She has also had some sleep disturbance and insomnia and has been out of her prazosin which is likely contributing to her insomnia  Will consult ED crisis worker  Will recommend inpatient psychiatric treatment at this time  Will check urine drug screen, pregnancy test, alcohol level and COVID swab for screening         Amount and/or Complexity of Data Reviewed  Clinical lab tests: ordered and reviewed  Discuss the patient with other providers: (ED crisis worker, Collin Campuzano)        Disposition  Final diagnoses:   Depression with suicidal ideation     Time reflects when diagnosis was documented in both MDM as applicable and the Disposition within this note     Time User Action Codes Description Comment 6/20/2021  3:32 PM Asia Ray [F32 9,  R42 629] Depression with suicidal ideation       ED Disposition     ED Disposition Condition Date/Time Comment    Transfer to [facility] Jun 20, 2021  6:43 PM Niru Nuñez should be transferred out to [facility], and has been medically cleared  MD Documentation      Most Recent Value   Patient Condition  The patient has been stabilized such that within reasonable medical probability, no material deterioration of the patient condition or the condition of the unborn child(neel) is likely to result from the transfer   Reason for Transfer  Level of Care needed not available at this facility   Benefits of Transfer  Continuity of care   Risks of Transfer  Potential for delay in receiving treatment   Accepting Physician  Dr Adeel Naylor Name, [facility]    (Name & Tel number)  Sonam Read @ [phone]   Transported by [employer] and Unit #)  CTS   Sending MD Dr Bj [place]   Provider Certification  General risk, such as traffic hazards, adverse weather conditions, rough terrain or turbulence, possible failure of equipment (including vehicle or aircraft), or consequences of actions of persons outside the control of the transport personnel      RN Documentation      [facility] Name, [facility] (Name & Tel number)  Sonam Read @ [phone]   Transport Mode  Car   Transported by [employer] and Unit #)  CTS   Level of Care  Other (Comment) [CTS]   Transfer Date  06/21/21   Transfer Time  1100      Follow-up Information    None         Discharge Medication List as of 6/21/2021 11:20 AM      CONTINUE these medications which have NOT CHANGED    Details   sertraline (ZOLOFT) 100 mg tablet Take 1 tablet (100 mg total) by mouth daily At 9am, Starting Fri 1/15/2021, Print      ARIPiprazole (ABILIFY) 5 mg tablet Take 1 tablet (5 mg total) by mouth daily At 9am, Starting Fri 1/15/2021, Print      ferrous sulfate 325 (65 Fe) mg tablet Take 1 tablet (325 mg total) by mouth daily with breakfast, Starting Fri 1/15/2021, Print      ondansetron (ZOFRAN-ODT) 4 mg disintegrating tablet Take 1 tablet (4 mg total) by mouth every 6 (six) hours as needed for nausea or vomiting, Starting Thu 6/3/2021, Print      prazosin (MINIPRESS) 2 mg capsule Take 1 capsule (2 mg total) by mouth daily at bedtime, Starting Fri 1/15/2021, Print           No discharge procedures on file      PDMP Review     None          ED Provider  Electronically Signed by           Nayely Rodriguez DO  06/28/21 2603

## 2021-06-20 NOTE — LETTER
[address] [facility] [address]  [address] [place] [address]  Dept: [phone]           EMTALA TRANSFER CONSENT    NAME Charity Hammond                                         2000                              MRN [id number]    I have been informed of my rights regarding examination, treatment, and transfer   by Dr Shasha Escalera MD    Benefits: Continuity of care    Risks: Potential for delay in receiving treatment      Consent for Transfer:  I acknowledge that my medical condition has been evaluated and explained to me by the emergency department physician or other qualified medical person and/or my attending physician, who has recommended that I be transferred to the service of  Accepting Physician: Dr Zahra Kessler at [address] Name, [address] : [facility]  The above potential benefits of such transfer, the potential risks associated with such transfer, and the probable risks of not being transferred have been explained to me, and I fully understand them  The doctor has explained that, in my case, the benefits of transfer outweigh the risks  I agree to be transferred  I authorize the performance of emergency medical procedures and treatments upon me in both transit and upon arrival at the receiving facility  Additionally, I authorize the release of any and all medical records to the receiving facility and request they be transported with me, if possible  I understand that the safest mode of transportation during a medical emergency is an ambulance and that the Hospital advocates the use of this mode of transport  Risks of traveling to the receiving facility by car, including absence of medical control, life sustaining equipment, such as oxygen, and medical personnel has been explained to me and I fully understand them  (PERLITA CORRECT BOX BELOW)  [ X ]  I consent to the stated transfer and to be transported by ambulance/helicopter    [  ]  I consent to the stated transfer, but refuse transportation by ambulance and accept full responsibility for my transportation by car  I understand the risks of non-ambulance transfers and I exonerate the Hospital and its staff from any deterioration in my condition that results from this refusal     X___________________________________________    DATE  21  TIME__21:53______  Signature of patient or legally responsible individual signing on patient behalf           RELATIONSHIP TO PATIENT__Self______________                                  Provider Certification    NAME Cindy Buitrago                                         2000                              MRN [id number]    A medical screening exam was performed on the above named patient  Based on the examination:    Condition Necessitating Transfer The encounter diagnosis was Depression with suicidal ideation  Patient Condition: The patient has been stabilized such that within reasonable medical probability, no material deterioration of the patient condition or the condition of the unborn child(neel) is likely to result from the transfer    Reason for Transfer: Level of Care needed not available at this facility    Transfer Requirements: [facility]   · [facility] available and qualified personnel available for treatment as acknowledged by NGOC Lopez @ [phone]  · Agreed to accept transfer and to provide appropriate medical treatment as acknowledged by       Dr Lorena Plummer  · Appropriate medical records of the examination and treatment of the patient are provided at the time of transfer   [facility], [address] _A  A ______  · Transfer will be performed by qualified personnel from [address] [place] [address]  and appropriate transfer equipment as required, including the use of necessary and appropriate life support measures      Provider Certification: I have examined the patient and explained the following risks and benefits of being transferred/refusing transfer to the patient/family:  General risk, such as traffic hazards, adverse weather conditions, rough terrain or turbulence, possible failure of equipment (including vehicle or aircraft), or consequences of actions of persons outside the control of the transport personnel      Based on these reasonable risks and benefits to the patient and/or the unborn child(neel), and based upon the information available at the time of the patients examination, I certify that the medical benefits reasonably to be expected from the provision of appropriate medical treatments at another medical facility outweigh the increasing risks, if any, to the individuals medical condition, and in the case of labor to the unborn child, from effecting the transfer      X____________________________________________ DATE 06/20/21        TIME_______      ORIGINAL - SEND TO MEDICAL RECORDS   COPY - SEND WITH PATIENT DURING TRANSFER

## 2021-06-20 NOTE — ED NOTES
Patient is accepted at [facility]  Patient is accepted by Dr Jonathan Rivera per Sahil Tolbert  Transportation is arranged with [facility] via [facility]  Transportation is scheduled for 11:00 am    Patient may go to the floor at anytime  CW informed [facility] of pt's BRODERICK Johnson, [facility], [address]  06/20/21 18:40

## 2021-06-20 NOTE — ED NOTES
Pt's belongings are as followed:    2 shirts  Chest binder  1 pants  1 underwear   1 sweater  1 pair of flip flops  1 kippah  1 pair of glasses  Cell phone  Cell phone   1 pair of headphones  Onur Ponce with $8 25 cash and misc cards including ID    All belongings are hanging on door outside FT 02        Fernando Salvage  06/20/21 [address] Vivi Yuli  06/20/21 [place] Vivi Yuli  06/20/21 [place] Vivi Yuli  06/20/21 9969

## 2021-06-20 NOTE — ED NOTES
As per [facility] - No beds    As per [facility] - No beds    As per Willi Breen - has 1 female bed left for a low acuity pt; CW faxed chart for review  As per [facility] - she has low acuity beds; CW faxed chart for review        [facility], [address]  06/20/21 17:31

## 2021-06-20 NOTE — ED NOTES
CW CW called [employer] @ [phone] to complete pre-cert  The recording provided the number for [employer] @ [phone]  CW called this # but the recorded message stated hrs of operation are Monday - Friday between 8 am - 4:30 pm  CW left a VM requesting a return call tomorrow morning to complete pre-cert  CW completed COB with Mayito York of [address] [place] [address]  CW informed Willi Plascencia of [facility] of the above, and Willi Plascencia stated it is fine to set up transport tonight and complete pre-cert tomorrow morning      [facility], [facility], [address]  06/20/21 18:58

## 2021-06-21 VITALS
WEIGHT: 210 LBS | DIASTOLIC BLOOD PRESSURE: 56 MMHG | TEMPERATURE: 98.2 F | OXYGEN SATURATION: 100 % | RESPIRATION RATE: 18 BRPM | HEIGHT: 64 IN | BODY MASS INDEX: 35.85 KG/M2 | HEART RATE: 63 BPM | SYSTOLIC BLOOD PRESSURE: 103 MMHG

## 2021-06-21 NOTE — ED NOTES
CTS arrived for Pt transport   All Pt belongings given to transport team       Gregg Lucero  06/21/21 2648

## 2021-06-21 NOTE — ED NOTES
Insurance Authorization for admission:   Phone call placed to [facility] Department ([address])  Phone number: [phone]  Spoke to [employer]  who indicated that they are unable to locate the member in their system using the Member ID or her name/  Spoke with [address] [place] [address] who requested that due to being further away, Trini Ornelas ([phone]) would complete the precert since the member has a Federal plan  Spoke with Reina Lorenzana , to request that they complete primary  Domitila Carver spoke with her Supervisor who approved moving forward with authorization  This will be an admission to another inpatient [facility] facility  3 days approved  Level of care: Inpatient Psych Tx (201)  Review on   Authorization # [id number]  Nurse report is not required   [address] [place] [address] at Kostas Rodriguez provided information on precert and transport time       Ac Garcia DARCI  [phone]  [phone]

## 2021-06-21 NOTE — ED NOTES
Pt sleeping at this time  Will obtain vital signs when he wakes up       Melvi Hopson RN  06/21/21 9706

## 2021-07-13 ENCOUNTER — HOSPITAL ENCOUNTER (EMERGENCY)
Facility: HOSPITAL | Age: 21
Discharge: HOME | End: 2021-07-13
Attending: EMERGENCY MEDICINE | Admitting: EMERGENCY MEDICINE
Payer: COMMERCIAL

## 2021-07-13 VITALS
OXYGEN SATURATION: 100 % | WEIGHT: 210 LBS | BODY MASS INDEX: 36.05 KG/M2 | DIASTOLIC BLOOD PRESSURE: 60 MMHG | SYSTOLIC BLOOD PRESSURE: 101 MMHG | RESPIRATION RATE: 18 BRPM | TEMPERATURE: 98.6 F | HEART RATE: 71 BPM

## 2021-07-13 DIAGNOSIS — Z76.0 ENCOUNTER FOR MEDICATION REFILL: Primary | ICD-10-CM

## 2021-07-13 DIAGNOSIS — F32.A DEPRESSION: ICD-10-CM

## 2021-07-13 DIAGNOSIS — F43.10 PTSD (POST-TRAUMATIC STRESS DISORDER): ICD-10-CM

## 2021-07-13 PROCEDURE — 99284 EMERGENCY DEPT VISIT MOD MDM: CPT | Performed by: EMERGENCY MEDICINE

## 2021-07-13 PROCEDURE — 99283 EMERGENCY DEPT VISIT LOW MDM: CPT

## 2021-07-13 RX ORDER — SERTRALINE HYDROCHLORIDE 100 MG/1
100 TABLET, FILM COATED ORAL DAILY
Qty: 15 TABLET | Refills: 0 | Status: SHIPPED | OUTPATIENT
Start: 2021-07-13 | End: 2021-07-28

## 2021-07-13 RX ORDER — PRAZOSIN HYDROCHLORIDE 2 MG/1
2 CAPSULE ORAL
Qty: 15 CAPSULE | Refills: 0 | Status: SHIPPED | OUTPATIENT
Start: 2021-07-13

## 2021-07-13 RX ORDER — ARIPIPRAZOLE 5 MG/1
5 TABLET ORAL DAILY
Qty: 15 TABLET | Refills: 0 | Status: SHIPPED | OUTPATIENT
Start: 2021-07-13 | End: 2021-07-28

## 2021-07-13 NOTE — ED PROVIDER NOTES
History  Chief Complaint   Patient presents with    Medical Problem     Per caretaker, patient has been out of her medications since being released from [facility] on June 29th  Patient did not keep appointment with kavita on June 30, and now has outdated scripts and is in need of medicaiton   Medication Refill     23year old here for med refill  Has follow up appointment on 7/20  Pt has been seen here by me in the past        History provided by:  Patient   used: No    Medical Problem  Severity:  Mild  Onset quality:  Gradual  Timing:  Constant  Progression:  Unchanged  Chronicity:  New  Associated symptoms: no chest pain, no fever, no headaches and no shortness of breath    Medication Refill      Prior to Admission Medications   Prescriptions Last Dose Informant Patient Reported? Taking?    ARIPiprazole (ABILIFY) 5 mg tablet  Self No No   Sig: Take 1 tablet (5 mg total) by mouth daily At 9am   Patient not taking: Reported on 6/20/2021   ARIPiprazole (ABILIFY) 5 mg tablet   No No   Sig: Take 1 tablet (5 mg total) by mouth daily for 15 days At 9am   ferrous sulfate 325 (65 Fe) mg tablet  Self No No   Sig: Take 1 tablet (325 mg total) by mouth daily with breakfast   ondansetron (ZOFRAN-ODT) 4 mg disintegrating tablet  Self No No   Sig: Take 1 tablet (4 mg total) by mouth every 6 (six) hours as needed for nausea or vomiting   Patient not taking: Reported on 6/20/2021   prazosin (MINIPRESS) 2 mg capsule  Self No No   Sig: Take 1 capsule (2 mg total) by mouth daily at bedtime   prazosin (MINIPRESS) 2 mg capsule   No No   Sig: Take 1 capsule (2 mg total) by mouth daily at bedtime   sertraline (ZOLOFT) 100 mg tablet  Self No No   Sig: Take 1 tablet (100 mg total) by mouth daily At 9am   sertraline (ZOLOFT) 100 mg tablet   No No   Sig: Take 1 tablet (100 mg total) by mouth daily for 15 days At 9am      Facility-Administered Medications: None       Past Medical History:   Diagnosis Date    Anxiety     Depression     History of psychiatric hospitalization     Panic attack     Psychiatric disorder     Suicidal ideations     Suicide attempt ([facility] [address] ) 2016       History reviewed  No pertinent surgical history  History reviewed  No pertinent family history  I have reviewed and agree with the history as documented  E-Cigarette/Vaping    E-Cigarette Use Never User      E-Cigarette/Vaping Substances    Nicotine No     THC No     CBD No     Flavoring No     Other No     Unknown No      Social History     Tobacco Use    Smoking status: Never Smoker    Smokeless tobacco: Never Used   Vaping Use    Vaping Use: Never used   Substance Use Topics    Alcohol use: Yes    Drug use: No       Review of Systems   Constitutional: Negative for fever  Respiratory: Negative for shortness of breath  Cardiovascular: Negative for chest pain  Neurological: Negative for headaches  All other systems reviewed and are negative  Physical Exam  Physical Exam  Vitals and nursing note reviewed  Constitutional:       General: He is not in acute distress  Appearance: He is well-developed  He is not diaphoretic  HENT:      Head: Normocephalic and atraumatic  Right Ear: External ear normal       Left Ear: External ear normal    Eyes:      General: No scleral icterus  Right eye: No discharge  Left eye: No discharge  Conjunctiva/sclera: Conjunctivae normal    Neck:      Thyroid: No thyromegaly  Vascular: No JVD  Trachea: No tracheal deviation  Cardiovascular:      Rate and Rhythm: Normal rate and regular rhythm  Pulmonary:      Effort: Pulmonary effort is normal  No respiratory distress  Breath sounds: Normal breath sounds  No stridor  No wheezing or rales  Abdominal:      General: Bowel sounds are normal  There is no distension  Palpations: Abdomen is soft  Tenderness: There is no abdominal tenderness     Musculoskeletal:         General: No tenderness or deformity  Normal range of motion  Cervical back: Normal range of motion and neck supple  Skin:     General: Skin is warm and dry  Neurological:      Mental Status: He is alert and oriented to person, place, and time  Cranial Nerves: No cranial nerve deficit        Coordination: Coordination normal    Psychiatric:         Behavior: Behavior normal          Vital Signs  ED Triage Vitals [07/13/21 1402]   Temperature Pulse Respirations Blood Pressure SpO2   98 6 °F (37 °C) 71 18 101/60 100 %      Temp Source Heart Rate Source Patient Position - Orthostatic VS BP Location FiO2 (%)   Oral Monitor Sitting Left arm --      Pain Score       --           Vitals:    07/13/21 1402   BP: 101/60   Pulse: 71   Patient Position - Orthostatic VS: Sitting         Visual Acuity      ED Medications  Medications - No data to display    Diagnostic Studies  Results Reviewed     None                 No orders to display              Procedures  Procedures         ED Course                                           MDM  Number of Diagnoses or Management Options  Encounter for medication refill: new and requires workup     Amount and/or Complexity of Data Reviewed  Decide to obtain previous medical records or to obtain history from someone other than the patient: yes  Review and summarize past medical records: yes    Patient Progress  Patient progress: stable      Disposition  Final diagnoses:   Encounter for medication refill     Time reflects when diagnosis was documented in both MDM as applicable and the Disposition within this note     Time User Action Codes Description Comment    7/13/2021  2:27 PM Suma Hubbard Add [Z76 0] Encounter for medication refill     7/13/2021  2:27 PM Suma Hubbard Add [F32 9] Depression     7/13/2021  2:28 PM Suma Hubbard Modify [F32 9] Depression     7/13/2021  2:28 PM Suma Hubbard Modify [F32 9] Depression     7/13/2021  2:28 PM Suma Hubbard Add [F43 10] PTSD (post-traumatic stress disorder)     7/13/2021  2:28 PM Vannesa Forth Modify [F32 9] Depression     7/13/2021  2:28 PM Vannesa Forth Modify [F43 10] PTSD (post-traumatic stress disorder)     7/13/2021  2:28 PM Vannesa Forth Modify [F32 9] Depression     7/13/2021  2:28 PM Vannesa Forth Modify [F43 10] PTSD (post-traumatic stress disorder)     7/13/2021  2:28 PM Vannesa Forth Modify [F32 9] Depression     7/13/2021  2:28 PM Vannesa Forth Modify [F43 10] PTSD (post-traumatic stress disorder)       ED Disposition     ED Disposition Condition Date/Time Comment    Discharge Stable Tue Jul 13, 2021  2:27 PM Stephany Sims discharge to home/self care              Follow-up Information     Follow up With Specialties Details Why Contact Info    Irene Randolph MD Pediatrics   [facility] [address]  N  [phone]            Current Discharge Medication List      CONTINUE these medications which have CHANGED    Details   ARIPiprazole (ABILIFY) 5 mg tablet Take 1 tablet (5 mg total) by mouth daily for 15 days At 9am  Qty: 15 tablet, Refills: 0    Associated Diagnoses: Depression      prazosin (MINIPRESS) 2 mg capsule Take 1 capsule (2 mg total) by mouth daily at bedtime  Qty: 15 capsule, Refills: 0    Associated Diagnoses: PTSD (post-traumatic stress disorder)      sertraline (ZOLOFT) 100 mg tablet Take 1 tablet (100 mg total) by mouth daily for 15 days At 9am  Qty: 15 tablet, Refills: 0    Associated Diagnoses: Depression         CONTINUE these medications which have NOT CHANGED    Details   ferrous sulfate 325 (65 Fe) mg tablet Take 1 tablet (325 mg total) by mouth daily with breakfast  Qty: 30 tablet, Refills: 0    Associated Diagnoses: Iron deficiency anemia, unspecified iron deficiency anemia type      ondansetron (ZOFRAN-ODT) 4 mg disintegrating tablet Take 1 tablet (4 mg total) by mouth every 6 (six) hours as needed for nausea or vomiting  Qty: 20 tablet, Refills: 0    Associated Diagnoses: Nausea           No discharge procedures on file      PDMP Review     None          ED Provider  Electronically Signed by           Alma Delia Simon DO  07/13/21 6818

## 2021-08-09 ENCOUNTER — APPOINTMENT (EMERGENCY)
Dept: CT IMAGING | Facility: HOSPITAL | Age: 21
End: 2021-08-09
Payer: COMMERCIAL

## 2021-08-09 ENCOUNTER — HOSPITAL ENCOUNTER (EMERGENCY)
Facility: HOSPITAL | Age: 21
Discharge: HOME | End: 2021-08-09
Attending: EMERGENCY MEDICINE | Admitting: EMERGENCY MEDICINE
Payer: COMMERCIAL

## 2021-08-09 VITALS
TEMPERATURE: 99.3 F | RESPIRATION RATE: 18 BRPM | DIASTOLIC BLOOD PRESSURE: 63 MMHG | OXYGEN SATURATION: 100 % | HEART RATE: 74 BPM | SYSTOLIC BLOOD PRESSURE: 104 MMHG

## 2021-08-09 DIAGNOSIS — R25.1 EPISODE OF SHAKING: Primary | ICD-10-CM

## 2021-08-09 DIAGNOSIS — F80.81 STUTTERING: ICD-10-CM

## 2021-08-09 LAB
AMPHETAMINES SERPL QL SCN: NEGATIVE
ANION GAP SERPL CALCULATED.3IONS-SCNC: 8 MMOL/L (ref 4–13)
ATRIAL RATE: 81 BPM
BACTERIA UR QL AUTO: ABNORMAL /HPF
BARBITURATES UR QL: NEGATIVE
BASOPHILS # BLD AUTO: 0.02 THOUSANDS/ΜL (ref 0–0.1)
BASOPHILS NFR BLD AUTO: 0 % (ref 0–1)
BENZODIAZ UR QL: NEGATIVE
BILIRUB UR QL STRIP: NEGATIVE
BUN SERPL-MCNC: 11 MG/DL (ref 5–25)
CALCIUM SERPL-MCNC: 8.6 MG/DL (ref 8.3–10.1)
CHLORIDE SERPL-SCNC: 104 MMOL/L (ref 100–108)
CLARITY UR: CLEAR
CO2 SERPL-SCNC: 29 MMOL/L (ref 21–32)
COCAINE UR QL: NEGATIVE
COLOR UR: YELLOW
CREAT SERPL-MCNC: 0.7 MG/DL (ref 0.6–1.3)
EOSINOPHIL # BLD AUTO: 0.12 THOUSAND/ΜL (ref 0–0.61)
EOSINOPHIL NFR BLD AUTO: 2 % (ref 0–6)
ERYTHROCYTE [DISTWIDTH] IN BLOOD BY AUTOMATED COUNT: 12.3 % (ref 11.6–15.1)
ETHANOL EXG-MCNC: 0 MG/DL
EXT PREG TEST URINE: NEGATIVE
EXT. CONTROL ED NAV: NORMAL
GLUCOSE SERPL-MCNC: 83 MG/DL (ref 65–140)
GLUCOSE UR STRIP-MCNC: NEGATIVE MG/DL
HCT VFR BLD AUTO: 35.4 % (ref 36.5–46.1)
HGB BLD-MCNC: 11.1 G/DL (ref 12–15.4)
HGB UR QL STRIP.AUTO: NEGATIVE
IMM GRANULOCYTES # BLD AUTO: 0.02 THOUSAND/UL (ref 0–0.2)
IMM GRANULOCYTES NFR BLD AUTO: 0 % (ref 0–2)
KETONES UR STRIP-MCNC: NEGATIVE MG/DL
LEUKOCYTE ESTERASE UR QL STRIP: ABNORMAL
LYMPHOCYTES # BLD AUTO: 1.77 THOUSANDS/ΜL (ref 0.6–4.47)
LYMPHOCYTES NFR BLD AUTO: 29 % (ref 14–44)
MCH RBC QN AUTO: 27.4 PG (ref 26.8–34.3)
MCHC RBC AUTO-ENTMCNC: 31.4 G/DL (ref 31.4–37.4)
MCV RBC AUTO: 87 FL (ref 82–98)
METHADONE UR QL: NEGATIVE
MONOCYTES # BLD AUTO: 0.47 THOUSAND/ΜL (ref 0.17–1.22)
MONOCYTES NFR BLD AUTO: 8 % (ref 4–12)
MUCOUS THREADS UR QL AUTO: ABNORMAL
NEUTROPHILS # BLD AUTO: 3.77 THOUSANDS/ΜL (ref 1.85–7.62)
NEUTS SEG NFR BLD AUTO: 61 % (ref 43–75)
NITRITE UR QL STRIP: NEGATIVE
NON-SQ EPI CELLS URNS QL MICRO: ABNORMAL /HPF
NRBC BLD AUTO-RTO: 0 /100 WBCS
OPIATES UR QL SCN: NEGATIVE
OXYCODONE+OXYMORPHONE UR QL SCN: NEGATIVE
P AXIS: 76 DEGREES
PCP UR QL: NEGATIVE
PH UR STRIP.AUTO: 6 [PH]
PLATELET # BLD AUTO: 210 THOUSANDS/UL (ref 149–390)
PMV BLD AUTO: 10.6 FL (ref 8.9–12.7)
POTASSIUM SERPL-SCNC: 3.9 MMOL/L (ref 3.5–5.3)
PR INTERVAL: 128 MS
PROT UR STRIP-MCNC: NEGATIVE MG/DL
QRS AXIS: 76 DEGREES
QRSD INTERVAL: 82 MS
QT INTERVAL: 422 MS
QTC INTERVAL: 490 MS
RBC # BLD AUTO: 4.05 MILLION/UL (ref 3.88–5.12)
RBC #/AREA URNS AUTO: ABNORMAL /HPF
SODIUM SERPL-SCNC: 141 MMOL/L (ref 136–145)
SP GR UR STRIP.AUTO: 1.02 (ref 1–1.03)
T WAVE AXIS: 51 DEGREES
THC UR QL: NEGATIVE
UROBILINOGEN UR QL STRIP.AUTO: 0.2 E.U./DL
VENTRICULAR RATE: 81 BPM
WBC # BLD AUTO: 6.17 THOUSAND/UL (ref 4.31–10.16)
WBC #/AREA URNS AUTO: ABNORMAL /HPF

## 2021-08-09 PROCEDURE — 36415 COLL VENOUS BLD VENIPUNCTURE: CPT

## 2021-08-09 PROCEDURE — 81025 URINE PREGNANCY TEST: CPT | Performed by: EMERGENCY MEDICINE

## 2021-08-09 PROCEDURE — 80307 DRUG TEST PRSMV CHEM ANLYZR: CPT | Performed by: EMERGENCY MEDICINE

## 2021-08-09 PROCEDURE — 82075 ASSAY OF BREATH ETHANOL: CPT | Performed by: EMERGENCY MEDICINE

## 2021-08-09 PROCEDURE — 85025 COMPLETE CBC W/AUTO DIFF WBC: CPT | Performed by: EMERGENCY MEDICINE

## 2021-08-09 PROCEDURE — 99284 EMERGENCY DEPT VISIT MOD MDM: CPT

## 2021-08-09 PROCEDURE — 80048 BASIC METABOLIC PNL TOTAL CA: CPT | Performed by: EMERGENCY MEDICINE

## 2021-08-09 PROCEDURE — 81001 URINALYSIS AUTO W/SCOPE: CPT | Performed by: EMERGENCY MEDICINE

## 2021-08-09 PROCEDURE — 93010 ELECTROCARDIOGRAM REPORT: CPT | Performed by: INTERNAL MEDICINE

## 2021-08-09 PROCEDURE — 70450 CT HEAD/BRAIN W/O DYE: CPT

## 2021-08-09 PROCEDURE — 93005 ELECTROCARDIOGRAM TRACING: CPT

## 2021-08-09 PROCEDURE — 99285 EMERGENCY DEPT VISIT HI MDM: CPT | Performed by: EMERGENCY MEDICINE

## 2021-08-09 NOTE — ED PROVIDER NOTES
History  Chief Complaint   Patient presents with    Medical Problem     Pt's  states the pt called her this morning reporting ticks and uncontrollable crying  Pt's  also states the pt had a episode of vertigo and since then has not been able to articulate words  Pt had a steady gait into triage       History provided by:  Patient (group home staff)   used: No    Medical Problem  Location:  Generalized  Quality:  "I can't articulate words"  Severity:  Moderate  Onset quality:  Sudden  Timing:  Constant  Progression:  Improving  Chronicity:  New  Context:  "this has happened before"  Relieved by:  Nothing  Worsened by:  Nothing  Ineffective treatments:  None tried  Associated symptoms: no abdominal pain, no chest pain, no congestion, no cough, no diarrhea, no fatigue, no fever, no nausea, no rash, no rhinorrhea, no shortness of breath, no sore throat and no vomiting        Prior to Admission Medications   Prescriptions Last Dose Informant Patient Reported? Taking?    ARIPiprazole (ABILIFY) 5 mg tablet   No No   Sig: Take 1 tablet (5 mg total) by mouth daily for 15 days At 9am   ferrous sulfate 325 (65 Fe) mg tablet  Self No No   Sig: Take 1 tablet (325 mg total) by mouth daily with breakfast   ondansetron (ZOFRAN-ODT) 4 mg disintegrating tablet  Self No No   Sig: Take 1 tablet (4 mg total) by mouth every 6 (six) hours as needed for nausea or vomiting   Patient not taking: Reported on 6/20/2021   prazosin (MINIPRESS) 2 mg capsule   No No   Sig: Take 1 capsule (2 mg total) by mouth daily at bedtime   sertraline (ZOLOFT) 100 mg tablet   No No   Sig: Take 1 tablet (100 mg total) by mouth daily for 15 days At 9am      Facility-Administered Medications: None       Past Medical History:   Diagnosis Date    Anxiety     Depression     History of psychiatric hospitalization     Panic attack     Psychiatric disorder     Suicidal ideations     Suicide attempt ([facility] [address] ) 2016 History reviewed  No pertinent surgical history  History reviewed  No pertinent family history  I have reviewed and agree with the history as documented  E-Cigarette/Vaping    E-Cigarette Use Never User      E-Cigarette/Vaping Substances    Nicotine No     THC No     CBD No     Flavoring No     Other No     Unknown No      Social History     Tobacco Use    Smoking status: Never Smoker    Smokeless tobacco: Never Used   Vaping Use    Vaping Use: Never used   Substance Use Topics    Alcohol use: Yes    Drug use: No       Review of Systems   Constitutional: Negative for appetite change, fatigue and fever  HENT: Negative for congestion, hearing loss, rhinorrhea, sore throat and voice change  Eyes: Negative for pain and visual disturbance  Respiratory: Negative for apnea, cough, chest tightness and shortness of breath  Cardiovascular: Negative for chest pain  Gastrointestinal: Negative for abdominal pain, blood in stool, diarrhea, nausea and vomiting  Genitourinary: Negative for difficulty urinating, dysuria, flank pain, frequency, hematuria and urgency  Musculoskeletal: Negative for back pain, gait problem, neck pain and neck stiffness  Skin: Negative for color change and rash  Allergic/Immunologic: Negative for immunocompromised state  Neurological: Positive for speech difficulty  Negative for dizziness, syncope and numbness         "shaking episode"   Hematological: Does not bruise/bleed easily  Psychiatric/Behavioral: Negative for confusion and suicidal ideas  Physical Exam  Physical Exam  Vitals reviewed  Constitutional:       Appearance: He is well-developed  He is not diaphoretic  HENT:      Head: Normocephalic  Eyes:      General: No scleral icterus  Conjunctiva/sclera: Conjunctivae normal       Pupils: Pupils are equal, round, and reactive to light  Neck:      Vascular: No JVD     Cardiovascular:      Rate and Rhythm: Normal rate and regular rhythm  Abdominal:      General: Bowel sounds are normal  There is no distension  Palpations: Abdomen is soft  Tenderness: There is no abdominal tenderness  There is no guarding or rebound  Musculoskeletal:         General: No tenderness or deformity  Normal range of motion  Cervical back: Normal range of motion and neck supple  Lymphadenopathy:      Cervical: No cervical adenopathy  Skin:     General: Skin is warm and dry  Capillary Refill: Capillary refill takes less than 2 seconds  Neurological:      General: No focal deficit present  Mental Status: He is alert and oriented to person, place, and time  Coordination: Coordination normal       Comments: Intermittent/inconsistent stuttering speech  Speech itself is normal without dysphagia or word-finding difficulties  Answers questions appropriately  Occasionally when patient is asked a question by group home staff he answers normally  4/4 strength in all four extremities, follows commands, no focal deficits   Psychiatric:      Comments: Denies SI/HI  Reports hallucinations of his mother but states Siddiqui Orts is normal for me           Vital Signs  ED Triage Vitals [08/09/21 1115]   Temperature Pulse Respirations Blood Pressure SpO2   99 3 °F (37 4 °C) 72 18 111/70 99 %      Temp Source Heart Rate Source Patient Position - Orthostatic VS BP Location FiO2 (%)   Oral Monitor Sitting Left arm --      Pain Score       --           Vitals:    08/09/21 1115 08/09/21 1330 08/09/21 1500   BP: 111/70 115/71 104/63   Pulse: 72 72 74   Patient Position - Orthostatic VS: Sitting           Visual Acuity  Visual Acuity      Most Recent Value   L Pupil Size (mm)  3   R Pupil Size (mm)  3          ED Medications  Medications - No data to display    Diagnostic Studies  Results Reviewed     Procedure Component Value Units Date/Time    UA (URINE) with reflex to Scope [[id number]]  (Abnormal) Collected: 08/09/21 1503    Lab Status: Final result Specimen: Urine, Clean Catch Updated: 08/09/21 1530     Color, UA Yellow     Clarity, UA Clear     Specific [place], UA 1 020     pH, UA 6 0     Leukocytes, UA Small     Nitrite, UA Negative     Protein, UA Negative mg/dl      Glucose, UA Negative mg/dl      Ketones, UA Negative mg/dl      Urobilinogen, UA 0 2 E U /dl      Bilirubin, UA Negative     Blood, UA Negative    Urine Microscopic [[id number]] Collected: 08/09/21 1503    Lab Status: In process Specimen: Urine, Clean Catch Updated: 08/09/21 1530    Rapid drug screen, urine [[id number]]  (Normal) Collected: 08/09/21 1503    Lab Status: Final result Specimen: Urine, Catheter Updated: 08/09/21 1520     Amph/Meth UR Negative     Barbiturate Ur Negative     Benzodiazepine Urine Negative     Cocaine Urine Negative     Methadone Urine Negative     Opiate Urine Negative     PCP Ur Negative     THC Urine Negative     Oxycodone Urine Negative    Narrative:      FOR MEDICAL PURPOSES ONLY  IF CONFIRMATION NEEDED PLEASE CONTACT THE LAB WITHIN 5 DAYS      Drug Screen Cutoff Levels:  AMPHETAMINE/METHAMPHETAMINES  1000 ng/mL  BARBITURATES     200 ng/mL  BENZODIAZEPINES     200 ng/mL  COCAINE      300 ng/mL  METHADONE      300 ng/mL  OPIATES      300 ng/mL  PHENCYCLIDINE     25 ng/mL  THC       50 ng/mL  OXYCODONE      100 ng/mL    POCT pregnancy, urine [[id number]]  (Normal) Resulted: 08/09/21 1437    Lab Status: Final result Updated: 08/09/21 1437     EXT PREG TEST UR (Ref: Negative) Negative     Control Valid    POCT alcohol breath test [[id number]]  (Normal) Resulted: 08/09/21 1415    Lab Status: Final result Updated: 08/09/21 1415     EXTBreath Alcohol 0 31    Basic metabolic panel [[id number]] Collected: 08/09/21 1120    Lab Status: Final result Specimen: Blood from Arm, Right Updated: 08/09/21 1143     Sodium 141 mmol/L      Potassium 3 9 mmol/L      Chloride 104 mmol/L      CO2 29 mmol/L      ANION GAP 8 mmol/L      BUN 11 mg/dL      Creatinine 0 70 mg/dL      Glucose 83 mg/dL      Calcium 8 6 mg/dL      eGFR --    Narrative:      Notes:     1  eGFR calculation is only valid for adults 18 years and older  2  EGFR calculation cannot be performed for patients who are transgender, non-binary, or whose legal sex, sex at birth, and gender identity differ  CBC and differential [[id number]]  (Abnormal) Collected: 08/09/21 1120    Lab Status: Final result Specimen: Blood from Arm, Right Updated: 08/09/21 1130     WBC 6 17 Thousand/uL      RBC 4 05 Million/uL      Hemoglobin 11 1 g/dL      Hematocrit 35 4 %      MCV 87 fL      MCH 27 4 pg      MCHC 31 4 g/dL      RDW 12 3 %      MPV 10 6 fL      Platelets 655 Thousands/uL      nRBC 0 /100 WBCs      Neutrophils Relative 61 %      Immat GRANS % 0 %      Lymphocytes Relative 29 %      Monocytes Relative 8 %      Eosinophils Relative 2 %      Basophils Relative 0 %      Neutrophils Absolute 3 77 Thousands/µL      Immature Grans Absolute 0 02 Thousand/uL      Lymphocytes Absolute 1 77 Thousands/µL      Monocytes Absolute 0 47 Thousand/µL      Eosinophils Absolute 0 12 Thousand/µL      Basophils Absolute 0 02 Thousands/µL                  CT head without contrast   Final Result by Andrew Pacheco MD (08/09 1427)      No acute intracranial abnormality  Workstation performed: [id number]                    Procedures  Procedures         ED Course                             SBIRT 20yo+      Most Recent Value   SBIRT (24 yo +)   In order to provide better care to our patients, we are screening all of our patients for alcohol and drug use  Would it be okay to ask you these screening questions? Yes Filed at: 08/09/2021 1327   Initial Alcohol Screen: US AUDIT-C    1  How often do you have a drink containing alcohol?  0 Filed at: 08/09/2021 1327   2  How many drinks containing alcohol do you have on a typical day you are drinking? 0 Filed at: 08/09/2021 1327   3a  Male UNDER 65:  How often do you have five or more drinks on one occasion? 0 Filed at: 08/09/2021 1327   3b  FEMALE Any Age, or MALE 65+: How often do you have 4 or more drinks on one occassion? 0 Filed at: 08/09/2021 1327   Audit-C Score  0 Filed at: 08/09/2021 1327   BRENDA: How many times in the past year have you    Used an illegal drug or used a prescription medication for non-medical reasons? Never Filed at: 08/09/2021 1327                    MDM  Number of Diagnoses or Management Options  Episode of shaking: new and requires workup  Stuttering: new and requires workup  Diagnosis management comments: 79-year-old with history of anxiety, depression, in previous psychiatric hospitalizations presented for an episode of shaking and having trouble articulating    Patient resides at [address] [place] [address], staff reports that pt has had similar symptoms in the past with "crisis events" related to increased stressors  On arrival today of patient is speaking clearly with a normal, organized thought process but it is intermittently stuttering  At times when distracted patient will speak with normal verbal flow  Patient and staff also report an episode of diffuse shaking or convulsions while patient was attempting to walk down flight of stairs  The patient denies ever losing consciousness  There is no loss of bladder function or tongue biting  Denies recent fevers, chest pain, shortness of breath, nausea, vomiting, URI symptoms  No headache or neck pain  Screening work up performed for reported "seizure-like activity " Seizure considered unlikely as no reported LOC or post-ictal period  Labs and CT head are unremarkable  On re-assessment patient reports feeling subjectively better, speech is now normal   Informed of normal labs and CT, will plan for discharge  Was seen by crisis provided outpatient resources  Discussed return precautions         Amount and/or Complexity of Data Reviewed  Clinical lab tests: ordered and reviewed  Tests in the radiology section of CPT®: ordered and reviewed  Review and summarize past medical records: yes  Independent visualization of images, tracings, or specimens: yes        Disposition  Final diagnoses:   Episode of shaking   Stuttering     Time reflects when diagnosis was documented in both MDM as applicable and the Disposition within this note     Time User Action Codes Description Comment    8/9/2021  3:09 PM Cherie Clutter Add [R25 1] Episode of shaking     8/9/2021  3:09 PM Cherie Clutter Add [F80 81] Stuttering       ED Disposition     ED Disposition Condition Date/Time Comment    Discharge Stable Mon Aug 9, 2021  3:09 PM Catalina Donahuea discharge to home/self care  Follow-up Information     Follow up With Specialties Details Why 607 Arlen Perkins MD Pediatrics   [id number] [place] [address]  N  [phone]      out patient psychiatry    Please follow-up with outpatient psychiatry as instructed by the emergency department crisis worker  Discharge Medication List as of 8/9/2021  3:10 PM      CONTINUE these medications which have NOT CHANGED    Details   ARIPiprazole (ABILIFY) 5 mg tablet Take 1 tablet (5 mg total) by mouth daily for 15 days At 9am, Starting Tue 7/13/2021, Until Wed 7/28/2021, Print      ferrous sulfate 325 (65 Fe) mg tablet Take 1 tablet (325 mg total) by mouth daily with breakfast, Starting Fri 1/15/2021, Print      ondansetron (ZOFRAN-ODT) 4 mg disintegrating tablet Take 1 tablet (4 mg total) by mouth every 6 (six) hours as needed for nausea or vomiting, Starting Thu 6/3/2021, Print      prazosin (MINIPRESS) 2 mg capsule Take 1 capsule (2 mg total) by mouth daily at bedtime, Starting Tue 7/13/2021, Print      sertraline (ZOLOFT) 100 mg tablet Take 1 tablet (100 mg total) by mouth daily for 15 days At 9am, Starting Tue 7/13/2021, Until Wed 7/28/2021, Print           No discharge procedures on file      PDMP Review     None          ED Provider  Electronically Signed by           Marley Small Leticia Armijo MD  08/09/21 1876

## 2021-08-09 NOTE — ED NOTES
Patient offered outpatient resources to follow up with at discharge, per the request of the ED physician      Chris Wallace LMSW  08/09/21  0827

## 2021-09-08 NOTE — PROGRESS NOTES
Calm, cooperative today  Social with peers, attends groups  Denied SI, HI, AVH  Anxiety improving, no panic attacks  Started menses, provided with pads  [FreeTextEntry1] : hemorrhoids w/ rectal bleeding\par -risks and benefits of rubber band ligation were reviewed at length and pt elected to proceed.  procedure performed as described\par -restart fiber supplementation\par -f/u in 2 weeks for re evaluation and possible additional banding

## 2021-11-19 ENCOUNTER — APPOINTMENT (OUTPATIENT)
Dept: OCCUPATIONAL THERAPY | Facility: MEDICAL CENTER | Age: 21
End: 2021-11-19

## 2021-11-19 PROCEDURE — 97530 THERAPEUTIC ACTIVITIES: CPT

## 2021-11-27 PROCEDURE — 86480 TB TEST CELL IMMUN MEASURE: CPT | Performed by: FAMILY MEDICINE

## 2022-07-02 ENCOUNTER — HOSPITAL ENCOUNTER (EMERGENCY)
Facility: HOSPITAL | Age: 22
End: 2022-07-03
Attending: EMERGENCY MEDICINE | Admitting: EMERGENCY MEDICINE
Payer: COMMERCIAL

## 2022-07-02 DIAGNOSIS — F41.9 ANXIETY: ICD-10-CM

## 2022-07-02 DIAGNOSIS — R45.851 DEPRESSION WITH SUICIDAL IDEATION: Primary | ICD-10-CM

## 2022-07-02 DIAGNOSIS — F32.A DEPRESSION WITH SUICIDAL IDEATION: Primary | ICD-10-CM

## 2022-07-02 LAB
AMPHETAMINES SERPL QL SCN: NEGATIVE
BARBITURATES UR QL: NEGATIVE
BENZODIAZ UR QL: NEGATIVE
COCAINE UR QL: NEGATIVE
ETHANOL EXG-MCNC: 0 MG/DL
EXT PREG TEST URINE: NEGATIVE
EXT. CONTROL ED NAV: NORMAL
FLUAV RNA RESP QL NAA+PROBE: NEGATIVE
FLUBV RNA RESP QL NAA+PROBE: NEGATIVE
METHADONE UR QL: NEGATIVE
OPIATES UR QL SCN: NEGATIVE
OXYCODONE+OXYMORPHONE UR QL SCN: NEGATIVE
PCP UR QL: NEGATIVE
RSV RNA RESP QL NAA+PROBE: NEGATIVE
SARS-COV-2 RNA RESP QL NAA+PROBE: NEGATIVE
THC UR QL: NEGATIVE

## 2022-07-02 PROCEDURE — 99285 EMERGENCY DEPT VISIT HI MDM: CPT | Performed by: EMERGENCY MEDICINE

## 2022-07-02 PROCEDURE — 80307 DRUG TEST PRSMV CHEM ANLYZR: CPT | Performed by: EMERGENCY MEDICINE

## 2022-07-02 PROCEDURE — 81025 URINE PREGNANCY TEST: CPT | Performed by: EMERGENCY MEDICINE

## 2022-07-02 PROCEDURE — 0241U HB NFCT DS VIR RESP RNA 4 TRGT: CPT | Performed by: EMERGENCY MEDICINE

## 2022-07-02 PROCEDURE — 99285 EMERGENCY DEPT VISIT HI MDM: CPT

## 2022-07-02 PROCEDURE — 82075 ASSAY OF BREATH ETHANOL: CPT | Performed by: EMERGENCY MEDICINE

## 2022-07-02 RX ORDER — SERTRALINE HYDROCHLORIDE 100 MG/1
100 TABLET, FILM COATED ORAL DAILY
Status: DISCONTINUED | OUTPATIENT
Start: 2022-07-03 | End: 2022-07-03 | Stop reason: HOSPADM

## 2022-07-02 RX ORDER — ARIPIPRAZOLE 5 MG/1
5 TABLET ORAL DAILY
Status: DISCONTINUED | OUTPATIENT
Start: 2022-07-03 | End: 2022-07-03 | Stop reason: HOSPADM

## 2022-07-02 RX ORDER — PRAZOSIN HYDROCHLORIDE 2 MG/1
2 CAPSULE ORAL
Status: DISCONTINUED | OUTPATIENT
Start: 2022-07-02 | End: 2022-07-03 | Stop reason: HOSPADM

## 2022-07-02 RX ADMIN — PRAZOSIN HYDROCHLORIDE 2 MG: 2 CAPSULE ORAL at 22:27

## 2022-07-02 NOTE — ED NOTES
CW spoke with:    [id number] [facility]; Rosa Schuster faxed chart for review      Chandra Peralta Calton Alanis  07/02/22 18:58

## 2022-07-02 NOTE — LETTER
[address] [facility] [address]  [address]  [place] [address]  Dept: [phone]           EMTALA TRANSFER CONSENT    NAME Catina BASURTO 2000                              MRN [id number]    I have been informed of my rights regarding examination, treatment, and transfer   by Dr Megan Melchor MD    Benefits: Continuity of care    Risks: Potential for delay in receiving treatment      Consent for Transfer:  I acknowledge that my medical condition has been evaluated and explained to me by the emergency department physician or other qualified medical person and/or my attending physician, who has recommended that I be transferred to the service of  Accepting Physician: Dr Christian Cogan at [address] [facility] Name, [address] : [facility]  The above potential benefits of such transfer, the potential risks associated with such transfer, and the probable risks of not being transferred have been explained to me, and I fully understand them  The doctor has explained that, in my case, the benefits of transfer outweigh the risks  I agree to be transferred  I authorize the performance of emergency medical procedures and treatments upon me in both transit and upon arrival at the receiving facility  Additionally, I authorize the release of any and all medical records to the receiving facility and request they be transported with me, if possible  I understand that the safest mode of transportation during a medical emergency is an ambulance and that the Hospital advocates the use of this mode of transport  Risks of traveling to the receiving facility by car, including absence of medical control, life sustaining equipment, such as oxygen, and medical personnel has been explained to me and I fully understand them  (PERLITA CORRECT BOX BELOW)  [ X ]  I consent to the stated transfer and to be transported by ambulance/helicopter    [  ]  I consent to the stated transfer, but refuse transportation by ambulance and accept full responsibility for my transportation by car  I understand the risks of non-ambulance transfers and I exonerate the Hospital and its staff from any deterioration in my condition that results from this refusal     X___________________________________________    DATE  22  TIME_21:43_______  Signature of patient or legally responsible individual signing on patient behalf           RELATIONSHIP TO PATIENT___Self______________________                              Provider Certification    NAME Kevin Wells                                         2000                              MRN [id number]    A medical screening exam was performed on the above named patient  Based on the examination:    Condition Necessitating Transfer The primary encounter diagnosis was Depression with suicidal ideation  A diagnosis of Anxiety was also pertinent to this visit  Patient Condition: The patient has been stabilized such that within reasonable medical probability, no material deterioration of the patient condition or the condition of the unborn child(neel) is likely to result from the transfer    Reason for Transfer: Level of Care needed not available at this facility    Transfer Requirements: [employer] available and qualified personnel available for treatment as acknowledged by [facility], [address] @ [phone]  · Agreed to accept transfer and to provide appropriate medical treatment as acknowledged by       Dr Romina Johnston  · Appropriate medical records of the examination and treatment of the patient are provided at the time of transfer   [address], [address] __A  A _____  · Transfer will be performed by qualified personnel from [address] [place] [address]  and appropriate transfer equipment as required, including the use of necessary and appropriate life support measures      Provider Certification: I have examined the patient and explained the following risks and benefits of being transferred/refusing transfer to the patient/family:  General risk, such as traffic hazards, adverse weather conditions, rough terrain or turbulence, possible failure of equipment (including vehicle or aircraft), or consequences of actions of persons outside the control of the transport personnel      Based on these reasonable risks and benefits to the patient and/or the unborn child(neel), and based upon the information available at the time of the patients examination, I certify that the medical benefits reasonably to be expected from the provision of appropriate medical treatments at another medical facility outweigh the increasing risks, if any, to the individuals medical condition, and in the case of labor to the unborn child, from effecting the transfer      X____________________________________________ DATE 07/02/22        TIME_______      ORIGINAL - SEND TO MEDICAL RECORDS   COPY - SEND WITH PATIENT DURING TRANSFER
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
## 2022-07-02 NOTE — ED NOTES
Both pt and Dr Ayesha Scott signed the 61 [phone] 81 document  As per [facility] of -, there are no in-network beds available ty [facility], [address]  07/02/22 18:46

## 2022-07-02 NOTE — ED NOTES
Patient belongings in [facility] locker [place], [place]  07/02/22 Stephania Drew

## 2022-07-02 NOTE — ED PROVIDER NOTES
Pt Name: Kevin Wells  MRN: [id number]  [place] 2000  Age/Sex: 24 y o  adult  Date of evaluation: 7/2/2022  PCP: Peter Ashraf MD    CHIEF COMPLAINT    Chief Complaint   Patient presents with   [facility]     Pt c/o anxiety and Intermittent  SI thoughts, denies HI           HPI    24 y o  adult presenting with depression, anxiety, suicidal thoughts  Patient states that he has had longstanding anxiety and depression, suffers from gender dysphoria, has not undergone surgery or hormone therapy at this time  Patient notes multiple stressors to include being  misgendered at work, an upcoming birthday, as well as "never being comfortable in my own skin, even at home"  Patient states that when his depression anxiety peaks, he has intrusive thoughts of suicide, notes plans of either stabbing himself in the neck or chest or electrocuting himself, with the latter particularly bring provoked when looking at an outlet when walking past   Patient denies any intent to commit suicide  Patient has had multiple prior inpatient hospitalizations, states he is not sure if he should go back inpatient again  HPI      Past Medical and Surgical History    Past Medical History:   Diagnosis Date    Anxiety     Depression     History of psychiatric hospitalization     Panic attack     Psychiatric disorder     Suicidal ideations     Suicide attempt ([facility] [address] ) 2016       History reviewed  No pertinent surgical history  History reviewed  No pertinent family history  Social History     Tobacco Use    Smoking status: Never Smoker    Smokeless tobacco: Never Used   Vaping Use    Vaping Use: Never used   Substance Use Topics    Alcohol use: Yes    Drug use: No           Allergies    No Known Allergies    Home Medications    Prior to Admission medications    Medication Sig Start Date End Date Taking?  Authorizing Provider   ARIPiprazole (ABILIFY) 5 mg tablet Take 1 tablet (5 mg total) by mouth daily for 15 days At 9am 7/13/21 7/28/21  Judie Border, DO   ferrous sulfate 325 (65 Fe) mg tablet Take 1 tablet (325 mg total) by mouth daily with breakfast 1/15/21   Arcelia Barraza PA-C   ondansetron (ZOFRAN-ODT) 4 mg disintegrating tablet Take 1 tablet (4 mg total) by mouth every 6 (six) hours as needed for nausea or vomiting  Patient not taking: Reported on 6/20/2021 6/3/21   Aye Waggoner MD   prazosin (MINIPRESS) 2 mg capsule Take 1 capsule (2 mg total) by mouth daily at bedtime 7/13/21   Judie Border, DO   sertraline (ZOLOFT) 100 mg tablet Take 1 tablet (100 mg total) by mouth daily for 15 days At [facility] 7/13/21 7/28/21  Judie Border, DO           Review of Systems    Review of Systems   Constitutional: Negative for chills and fever  HENT: Negative for ear pain and sore throat  Eyes: Negative for pain and visual disturbance  Respiratory: Negative for cough and shortness of breath  Cardiovascular: Negative for chest pain and palpitations  Gastrointestinal: Negative for abdominal pain and vomiting  Genitourinary: Negative for dysuria and hematuria  Musculoskeletal: Negative for arthralgias and back pain  Skin: Negative for color change and rash  Neurological: Negative for seizures and syncope  Psychiatric/Behavioral: Positive for dysphoric mood and suicidal ideas  The patient is nervous/anxious  All other systems reviewed and are negative  All other systems reviewed and negative  Physical Exam      ED Triage Vitals   Temperature Pulse Respirations Blood Pressure SpO2   07/02/22 1441 07/02/22 1441 07/02/22 1441 07/02/22 1441 07/02/22 1441   98 °F (36 7 °C) 79 19 125/78 98 %      Temp Source Heart Rate Source Patient Position - Orthostatic VS BP Location FiO2 (%)   07/02/22 1441 07/02/22 1441 07/02/22 1441 07/02/22 1441 --   Temporal Monitor Sitting Left arm       Pain Score       07/02/22 1629       No Pain               Physical Exam  Vitals and nursing note reviewed  Constitutional:       General: He is not in acute distress  Appearance: He is well-developed  He is not ill-appearing, toxic-appearing or diaphoretic  HENT:      Head: Normocephalic and atraumatic  Right Ear: External ear normal       Left Ear: External ear normal    Eyes:      Conjunctiva/sclera: Conjunctivae normal    Cardiovascular:      Rate and Rhythm: Normal rate and regular rhythm  Heart sounds: No murmur heard  Pulmonary:      Effort: Pulmonary effort is normal  No respiratory distress  Breath sounds: Normal breath sounds  Abdominal:      Palpations: Abdomen is soft  Tenderness: There is no abdominal tenderness  Musculoskeletal:      Cervical back: Neck supple  Skin:     General: Skin is warm and dry  Neurological:      Mental Status: He is alert  Psychiatric:      Comments: Patient somewhat tearful, speaking rapidly, avoids eye contact, depressed and anxious affect concordant with stated mood, appropriately groomed, endorsing suicidal ideation with plans but without concrete intent at this time  Denies homicidal ideation auditory visual hallucinations  Diagnostic Results      Labs:    Results Reviewed     Procedure Component Value Units Date/Time    Rapid drug screen, urine [[id number]]  (Normal) Collected: 07/02/22 1708    Lab Status: Final result Specimen: Urine, Clean Catch Updated: 07/02/22 1740     Amph/Meth UR Negative     Barbiturate Ur Negative     Benzodiazepine Urine Negative     Cocaine Urine Negative     Methadone Urine Negative     Opiate Urine Negative     PCP Ur Negative     THC Urine Negative     Oxycodone Urine Negative    Narrative:      FOR MEDICAL PURPOSES ONLY  IF CONFIRMATION NEEDED PLEASE CONTACT THE LAB WITHIN 5 DAYS      Drug Screen Cutoff Levels:  AMPHETAMINE/METHAMPHETAMINES  1000 ng/mL  BARBITURATES     200 ng/mL  BENZODIAZEPINES     200 ng/mL  COCAINE      300 ng/mL  METHADONE      300 ng/mL  OPIATES      300 ng/mL  PHENCYCLIDINE     25 ng/mL  THC       50 ng/mL  OXYCODONE      100 ng/mL    COVID/FLU/RSV - 2 hour TAT [[id number]]  (Normal) Collected: 07/02/22 1627    Lab Status: Final result Specimen: Nares from Nose Updated: 07/02/22 1722     SARS-CoV-2 Negative     INFLUENZA A PCR Negative     INFLUENZA B PCR Negative     RSV PCR Negative    Narrative:      FOR PEDIATRIC PATIENTS - copy/paste COVID Guidelines URL to browser: [email or web address]  [employer]    SARS-CoV-2 assay is a Nucleic Acid Amplification assay intended for the  qualitative detection of nucleic acid from SARS-CoV-2 in nasopharyngeal  swabs  Results are for the presumptive identification of SARS-CoV-2 RNA  Positive results are indicative of infection with SARS-CoV-2, the virus  causing COVID-19, but do not rule out bacterial infection or co-infection  with other viruses  Laboratories within the [place] and its  territories are required to report all positive results to the appropriate  public health authorities  Negative results do not preclude SARS-CoV-2  infection and should not be used as the sole basis for treatment or other  patient management decisions  Negative results must be combined with  clinical observations, patient history, and epidemiological information  This test has not been FDA cleared or approved  This test has been authorized by FDA under an Emergency Use Authorization  (EUA)  This test is only authorized for the duration of time the  declaration that circumstances exist justifying the authorization of the  emergency use of an in vitro diagnostic tests for detection of SARS-CoV-2  virus and/or diagnosis of COVID-19 infection under section 564(b)(1) of  the Act, 21 U  S C  058QYM-5(G)(9), unless the authorization is terminated  or revoked sooner  The test has been validated but independent review by FDA  and CLIA is pending  Test performed using [employer] Gene[employer]pert:  This RT-PCR assay targets N2,  a region unique to SARS-CoV-2  A conserved region in the E-gene was chosen  for pan-Sarbecovirus detection which includes SARS-CoV-2  POCT pregnancy, urine [[id number]]  (Normal) Resulted: 07/02/22 1709    Lab Status: Final result Specimen: Urine Updated: 07/02/22 1712     EXT PREG TEST UR (Ref: Negative) negative     Control valid    POCT alcohol breath test [[id number]]  (Normal) Resulted: 07/02/22 1613    Lab Status: Final result Updated: 07/02/22 1613     EXTBreath Alcohol 0 000          All labs reviewed and utilized in the medical decision making process    Radiology:    No orders to display       All radiology studies independently viewed by me and interpreted by the radiologist     Procedure    Procedures        ED Course of Care and Re-Assessments      Patient seen exam by crisis, appreciate input  After discussion with patient as well as with crisis, patient desires inpatient treatment which seems reasonable from a safety standpoint  Two hundred one signed  Medications   ARIPiprazole (ABILIFY) tablet 5 mg (has no administration in time range)   prazosin (MINIPRESS) capsule 2 mg (has no administration in time range)   sertraline (ZOLOFT) tablet 100 mg (has no administration in time range)           FINAL IMPRESSION    Final diagnoses:   Depression with suicidal ideation   Anxiety         DISPOSITION/PLAN    24 y o  adult with history and symptoms above  Vital signs reassuring, examination unremarkable other than abnormalities in Behavioral Health exam as above  At this time, no evidence of acute intoxication, organic cause of Behavioral Health symptoms, sepsis, meningitis, encephalitis, or other systemic infection, significant trauma, other life threatening condition  Patient medically cleared for inpatient psychiatric admission and awaiting appropriate placement  Hemodynamically stable and comfortable at time of sign out to Dr Patrica Pires at time of shift change    Time reflects when diagnosis was documented in both MDM as applicable and the Disposition within this note     Time User Action Codes Description Comment    7/2/2022  6:14 PM Jon Burrs Add Lisa Jaime Needs Depression with suicidal ideation     7/2/2022  6:14 PM Jon Burrs Add [F41 9] Anxiety       ED Disposition     ED Disposition   Transfer to Behavioral Health    Condition   --    Date/Time   Sat Jul 2, 2022  6:14 PM    Comment   Zara Siemens should be transferred out to [facility] and has been medically cleared  MD Documentation    [address] Most Recent Value   Sending MD Dr Pruitt Ill      Follow-up Information    None           PATIENT REFERRED TO:    No follow-up provider specified  DISCHARGE MEDICATIONS:    Patient's Medications   Discharge Prescriptions    No medications on file       No discharge procedures on file  Ирина Todd MD    Portions of the record may have been created with voice recognition software  Occasional wrong word or "sound alike" substitutions may have occurred due to the inherent limitations of voice recognition software    Please read the chart carefully and recognize, using context, where substitutions have occurred     Ирина Todd MD  07/02/22 2588

## 2022-07-03 VITALS
TEMPERATURE: 98.5 F | BODY MASS INDEX: 34.16 KG/M2 | OXYGEN SATURATION: 98 % | DIASTOLIC BLOOD PRESSURE: 64 MMHG | WEIGHT: 205 LBS | RESPIRATION RATE: 18 BRPM | HEIGHT: 65 IN | HEART RATE: 74 BPM | SYSTOLIC BLOOD PRESSURE: 120 MMHG

## 2022-07-03 RX ORDER — DIAPER,BRIEF,INFANT-TODD,DISP
EACH MISCELLANEOUS 4 TIMES DAILY PRN
Status: DISCONTINUED | OUTPATIENT
Start: 2022-07-03 | End: 2022-07-03 | Stop reason: HOSPADM

## 2022-07-03 RX ADMIN — ARIPIPRAZOLE 5 MG: 5 TABLET ORAL at 09:10

## 2022-07-03 RX ADMIN — SERTRALINE 100 MG: 100 TABLET, FILM COATED ORAL at 09:10

## 2022-07-03 NOTE — ED NOTES
Patient is accepted at [facility]   Patient is accepted by Dr Luna Camacho Copping  Transportation is arranged with **     Transportation is scheduled for **  Patient may go to the floor before 02:00 or after 09:00       Ivonne Jay is working on securing transport with [facility] for tomorrow morning  He will call back with an ETA        Nora Kelly, [facility], [address]  07/02/22 20:25 no

## 2022-07-03 NOTE — ED NOTES
Pt father left bedside for the night  Pt fathers personal belonging given from visitor locker #1  Sanket Mirza, ALFREDO  07/02/22 4266

## 2022-07-03 NOTE — ED CARE HANDOFF
Emergency Department Sign Out Note        Sign out and transfer of care from Dr Parris Fernandez  See Separate Emergency Department note  The patient, Santos Patel, was evaluated by the previous provider for depression, anxiety, suicidal ideation  Workup Completed:  Medically cleared for inpatient psychiatric admission, 201 signed, pending appropriate placement  ED Course / Workup Pending (followup): No significant events throughout remainder shift until time of departure via EMS, patient hemodynamically stable and comfortable at that time  Procedures  MDM        Disposition  Final diagnoses:   Depression with suicidal ideation   Anxiety     Time reflects when diagnosis was documented in both MDM as applicable and the Disposition within this note     Time User Action Codes Description Comment    7/2/2022  6:14 PM Gadiel Hobson  Adeline Dodson Depression with suicidal ideation     7/2/2022  6:14 PM Gadiel Padilla Add [F41 9] Anxiety       ED Disposition     ED Disposition   Transfer to Behavioral Health Condition   --    Date/Time   Sat Jul 2, 2022  6:14 PM    Comment   Santos Patel should be transferred out to [facility] and has been medically cleared             MD Documentation    [address] [facility] Most Recent Value   Patient Condition The patient has been stabilized such that within reasonable medical probability, no material deterioration of the patient condition or the condition of the unborn child(neel) is likely to result from the transfer   Reason for Transfer Level of Care needed not available at this facility   Benefits of Transfer Continuity of care   Risks of Transfer Potential for delay in receiving treatment   Accepting Physician Dr Jing Aguilar, [facility]    (Name & Tel number) Sonam Masters @ [phone]   Transported by (Company and Unit #) [address]   Sending MD Dr Latesha Ortiz   Provider Certification General risk, such as traffic hazards, adverse weather conditions, rough terrain or turbulence, possible failure of equipment (including vehicle or aircraft), or consequences of actions of persons outside the control of the transport personnel      RN Documentation    Flowsheet Row Most [address] [facility] [address] [address] Name, [address]    (Name & Tel number) Sonam Jain @ [phone]   Transported by [employer] and Unit #) [facility]   Level of Care Other (Comment)  [CTS]   Transfer Date 07/03/22      Follow-up Information    None       Patient's Medications   Discharge Prescriptions    No medications on file     No discharge procedures on file         ED Provider  Electronically Signed by     Jacob Manriquez MD  07/03/22 4998

## 2022-07-03 NOTE — ED NOTES
CW placed call to [facility], spoke sommer/Lindy  CW provided ETA information      TDS, CW

## 2022-07-03 NOTE — ED NOTES
As per policy, vitals not obtained at this time due to pt sleeping  Pt shows no signs of distress, will continue to monitor  Chavo Timmons, [address] [facility]  07/03/22 004

## 2022-07-03 NOTE — ED NOTES
CW received TT from [facility]  ETA is [address]      TDS, CW

## 2022-07-03 NOTE — ED NOTES
CW spoke with Sammie Heart of [facility] [employer] @ [phone], who stated she does not cover [place] cases, only [place], [place], [place], [place] and [place]  Sammie Heart provided the number for the [place] branch of [facility] FEP @ [phone]  CW spoke with eHrb Larson who stated this number is for [facility], the [address] carve-out plan for [facility] FEP  After checking pt's info, Herb Larson noted that pt has been terminated with them as of 6/30/21      CW then spoke with Willie of [facility] who confirmed that pt is covered with them, and he logged call for pre-cert request     Chandra Mcdowell, SUNDAR  07/02/22 21:03

## 2022-07-03 NOTE — ED NOTES
Insurance Authorization for admission:   Phone call placed to [facility]  Phone number: [phone]  Spoke to Luis E Bolus  14 days approved  Level of care: 201  Review on 7/15/22  Authorization # [id number]  CW informed Zaheer Orourke of pt's [employer]  EVS (Eligibility Verification System) called - [phone]  Automated system indicates: [place] eligible  Insurance Authorization for Transportation:      No transport auth needed for [facility]      [facility], [address] [place] [address] Drive  07/02/22 21:32

## 2022-08-25 ENCOUNTER — APPOINTMENT (OUTPATIENT)
Dept: LAB | Facility: CLINIC | Age: 22
End: 2022-08-25
Payer: COMMERCIAL

## 2022-08-25 DIAGNOSIS — F64.0 TRANSSEXUALISM: ICD-10-CM

## 2022-08-25 LAB
ALBUMIN SERPL BCP-MCNC: 3.4 G/DL (ref 3.5–5)
ALP SERPL-CCNC: 52 U/L (ref 46–116)
ALT SERPL W P-5'-P-CCNC: 19 U/L (ref 12–78)
ANION GAP SERPL CALCULATED.3IONS-SCNC: 2 MMOL/L (ref 4–13)
AST SERPL W P-5'-P-CCNC: 14 U/L (ref 5–45)
BASOPHILS # BLD AUTO: 0.03 THOUSANDS/ΜL (ref 0–0.1)
BASOPHILS NFR BLD AUTO: 1 % (ref 0–1)
BILIRUB SERPL-MCNC: 0.26 MG/DL (ref 0.2–1)
BUN SERPL-MCNC: 11 MG/DL (ref 5–25)
CALCIUM ALBUM COR SERPL-MCNC: 9.3 MG/DL (ref 8.3–10.1)
CALCIUM SERPL-MCNC: 8.8 MG/DL (ref 8.3–10.1)
CHLORIDE SERPL-SCNC: 109 MMOL/L (ref 96–108)
CO2 SERPL-SCNC: 27 MMOL/L (ref 21–32)
CREAT SERPL-MCNC: 0.77 MG/DL (ref 0.6–1.3)
EOSINOPHIL # BLD AUTO: 0.13 THOUSAND/ΜL (ref 0–0.61)
EOSINOPHIL NFR BLD AUTO: 2 % (ref 0–6)
ERYTHROCYTE [DISTWIDTH] IN BLOOD BY AUTOMATED COUNT: 12.7 % (ref 11.6–15.1)
ESTRADIOL SERPL-MCNC: 96 PG/ML (ref 11–52.5)
GLUCOSE P FAST SERPL-MCNC: 89 MG/DL (ref 65–99)
HCT VFR BLD AUTO: 35.7 % (ref 36.5–46.1)
HGB BLD-MCNC: 10.9 G/DL (ref 12–15.4)
IMM GRANULOCYTES # BLD AUTO: 0.01 THOUSAND/UL (ref 0–0.2)
IMM GRANULOCYTES NFR BLD AUTO: 0 % (ref 0–2)
LYMPHOCYTES # BLD AUTO: 1.89 THOUSANDS/ΜL (ref 0.6–4.47)
LYMPHOCYTES NFR BLD AUTO: 31 % (ref 14–44)
MCH RBC QN AUTO: 26.7 PG (ref 26.8–34.3)
MCHC RBC AUTO-ENTMCNC: 30.5 G/DL (ref 31.4–37.4)
MCV RBC AUTO: 88 FL (ref 82–98)
MONOCYTES # BLD AUTO: 0.53 THOUSAND/ΜL (ref 0.17–1.22)
MONOCYTES NFR BLD AUTO: 9 % (ref 4–12)
NEUTROPHILS # BLD AUTO: 3.54 THOUSANDS/ΜL (ref 1.85–7.62)
NEUTS SEG NFR BLD AUTO: 57 % (ref 43–75)
NRBC BLD AUTO-RTO: 0 /100 WBCS
PLATELET # BLD AUTO: 271 THOUSANDS/UL (ref 149–390)
PMV BLD AUTO: 11.6 FL (ref 8.9–12.7)
POTASSIUM SERPL-SCNC: 4 MMOL/L (ref 3.5–5.3)
PROT SERPL-MCNC: 8 G/DL (ref 6.4–8.4)
RBC # BLD AUTO: 4.08 MILLION/UL (ref 3.88–5.12)
SODIUM SERPL-SCNC: 138 MMOL/L (ref 135–147)
WBC # BLD AUTO: 6.13 THOUSAND/UL (ref 4.31–10.16)

## 2022-08-25 PROCEDURE — 84403 ASSAY OF TOTAL TESTOSTERONE: CPT

## 2022-08-25 PROCEDURE — 85025 COMPLETE CBC W/AUTO DIFF WBC: CPT

## 2022-08-25 PROCEDURE — 82670 ASSAY OF TOTAL ESTRADIOL: CPT

## 2022-08-25 PROCEDURE — 80053 COMPREHEN METABOLIC PANEL: CPT

## 2022-08-25 PROCEDURE — 36415 COLL VENOUS BLD VENIPUNCTURE: CPT

## 2022-08-25 PROCEDURE — 84402 ASSAY OF FREE TESTOSTERONE: CPT

## 2022-08-26 LAB
TESTOST FREE SERPL-MCNC: 2 PG/ML (ref 0–4.2)
TESTOST SERPL-MCNC: 13 NG/DL (ref 13–71)

## 2022-11-03 ENCOUNTER — OFFICE VISIT (OUTPATIENT)
Dept: FAMILY MEDICINE CLINIC | Facility: CLINIC | Age: 22
End: 2022-11-03

## 2022-11-03 VITALS
OXYGEN SATURATION: 98 % | SYSTOLIC BLOOD PRESSURE: 108 MMHG | HEART RATE: 84 BPM | WEIGHT: 243 LBS | TEMPERATURE: 98.6 F | BODY MASS INDEX: 40.48 KG/M2 | DIASTOLIC BLOOD PRESSURE: 70 MMHG | HEIGHT: 65 IN

## 2022-11-03 DIAGNOSIS — F33.42 RECURRENT MAJOR DEPRESSIVE DISORDER, IN FULL REMISSION (HCC): ICD-10-CM

## 2022-11-03 DIAGNOSIS — H69.82 DYSFUNCTION OF LEFT EUSTACHIAN TUBE: Primary | ICD-10-CM

## 2022-11-03 RX ORDER — LEVONORGESTREL AND ETHINYL ESTRADIOL AND ETHINYL ESTRADIOL 150-30(84)
1 KIT ORAL DAILY
COMMUNITY
Start: 2022-10-25

## 2022-11-03 RX ORDER — FLUTICASONE PROPIONATE 50 MCG
1 SPRAY, SUSPENSION (ML) NASAL DAILY
Qty: 11 ML | Refills: 3 | Status: SHIPPED | OUTPATIENT
Start: 2022-11-03

## 2022-11-03 RX ORDER — CETIRIZINE HYDROCHLORIDE 10 MG/1
10 TABLET ORAL
Qty: 30 TABLET | Refills: 1 | Status: SHIPPED | OUTPATIENT
Start: 2022-11-03

## 2022-11-03 RX ORDER — TESTOSTERONE CYPIONATE 200 MG/ML
INJECTION INTRAMUSCULAR
COMMUNITY
Start: 2022-10-24

## 2022-11-03 RX ORDER — LEVONORGESTREL / ETHINYL ESTRADIOL AND ETHINYL ESTRADIOL 150-30(84)
1 KIT ORAL DAILY
COMMUNITY
Start: 2022-07-24

## 2022-11-03 NOTE — PROGRESS NOTES
BMI Counseling: Body mass index is 40 44 kg/m²  The BMI is above normal  Nutrition recommendations include decreasing portion sizes, encouraging healthy choices of fruits and vegetables, decreasing fast food intake, consuming healthier snacks, limiting drinks that contain sugar, moderation in carbohydrate intake, increasing intake of lean protein, reducing intake of saturated and trans fat and reducing intake of cholesterol  Exercise recommendations include moderate physical activity 150 minutes/week and exercising 3-5 times per week  No pharmacotherapy was ordered  Rationale for BMI follow-up plan is due to patient being overweight or obese  Assessment/Plan:     Chronic Problems:  No problem-specific Assessment & Plan notes found for this encounter  Visit Diagnosis:  Diagnoses and all orders for this visit:    Dysfunction of left eustachian tube  -     fluticasone (FLONASE) 50 mcg/act nasal spray; 1 spray into each nostril daily  -     cetirizine (ZyrTEC) 10 mg tablet; Take 1 tablet (10 mg total) by mouth daily at bedtime    Recurrent major depressive disorder, [facility] )    Other orders  -     Levonorgest-Eth Estrad 91-Day 0 15-0 03 &0 01 MG TABS; Take 1 tablet by mouth daily  -     Ashlyna 0 15-0 03 &0 01 MG TABS; Take 1 tablet by mouth daily  -     testosterone cypionate (DEPO-TESTOSTERONE) 200 mg/mL SOLN; INJECT 1 CC INTRAMUSCULARLY EVERY 14 DAYS          Subjective:    Patient ID: Moses Wilhelm is a 20 y o  adult      Establish   Last pcp   Transitioning in process for the past 8yr   For weeks has been experiencing   Past med     Psych = devin mendoza md [facility]     Soc hx   Single   Hs grad [employer] ,    Non smoker         The following portions of the patient's history were reviewed and updated as appropriate: allergies, current medications, past family history, past medical history, past social history, past surgical history and problem list     Review of Systems      /70   Pulse 84   Temp 98 6 °F (37 °C)   Ht 5' 5" (1 651 m)   Wt 110 kg (243 lb)   SpO2 98%   BMI 40 44 kg/m²   Social History     Socioeconomic History   • Marital status: Single     Spouse name: Not on file   • Number of children: Not on file   • Years of education: Not on file   • Highest education level: Not on file   Occupational History   • Not on file   Tobacco Use   • Smoking status: Never Smoker   • Smokeless tobacco: Never Used   Vaping Use   • Vaping Use: Never used   Substance and Sexual Activity   • Alcohol use: Yes   • Drug use: No   • Sexual activity: Not on file   Other Topics Concern   • Not on file   Social History Narrative   • Not on file     Social Determinants of Health     Financial Resource Strain: Not on file   Food Insecurity: Not on file   Transportation Needs: Not on file   Physical Activity: Not on file   Stress: Not on file   Social Connections: Not on file   Intimate Partner Violence: Not on file   Housing Stability: Not on file     Past Medical History:   Diagnosis Date   • Anxiety    • Depression    • History of psychiatric hospitalization    • Panic attack    • Psychiatric disorder    • Suicidal ideations    • [facility]     History reviewed  No pertinent family history  History reviewed  No pertinent surgical history      Current Outpatient Medications:   •  Ashlyna 0 15-0 03 &0 01 MG TABS, Take 1 tablet by mouth daily, Disp: , Rfl:   •  cetirizine (ZyrTEC) 10 mg tablet, Take 1 tablet (10 mg total) by mouth daily at bedtime, Disp: 30 tablet, Rfl: 1  •  fluticasone (FLONASE) 50 mcg/act nasal spray, 1 spray into each nostril daily, Disp: 11 mL, Rfl: 3  •  Levonorgest-Eth Estrad 91-Day 0 15-0 03 &0 01 MG TABS, Take 1 tablet by mouth daily, Disp: , Rfl:   •  prazosin (MINIPRESS) 2 mg capsule, Take 1 capsule (2 mg total) by mouth daily at bedtime (Patient taking differently: Take 1 mg by mouth daily at bedtime), Disp: 15 capsule, Rfl: 0  •  sertraline (ZOLOFT) 100 mg tablet, Take 1 tablet (100 mg total) by mouth daily for 15 days At 9am (Patient taking differently: Take 100 mg by mouth daily 150mg daily), Disp: 15 tablet, Rfl: 0  •  ARIPiprazole (ABILIFY) 5 mg tablet, Take 1 tablet (5 mg total) by mouth daily for 15 days At 9am, Disp: 15 tablet, Rfl: 0  •  ferrous sulfate 325 (65 Fe) mg tablet, Take 1 tablet (325 mg total) by mouth daily with breakfast (Patient not taking: Reported on 11/3/2022), Disp: 30 tablet, Rfl: 0  •  ondansetron (ZOFRAN-ODT) 4 mg disintegrating tablet, Take 1 tablet (4 mg total) by mouth every 6 (six) hours as needed for nausea or vomiting, Disp: 20 tablet, Rfl: 0  •  testosterone cypionate (DEPO-TESTOSTERONE) 200 mg/mL SOLN, INJECT 1 CC INTRAMUSCULARLY EVERY 14 DAYS, Disp: , Rfl:     Allergies   Allergen Reactions   • Flavoring Agent - Food Allergy Diarrhea and GI Intolerance     Hot dogs   • Lactose - Food Allergy Abdominal Pain, GI Intolerance, Nausea Only and Vomiting   • Orange Oil - Food Allergy Abdominal Pain, Diarrhea and Vomiting     Hot dogs          Lab Review   No visits with results within 2 Month(s) from this visit     Latest known visit with results is:   Appointment on 08/25/2022   Component Date Value   • WBC 08/25/2022 6 13    • RBC 08/25/2022 4 08    • Hemoglobin 08/25/2022 10 9 (A)   • Hematocrit 08/25/2022 35 7 (A)   • MCV 08/25/2022 88    • MCH 08/25/2022 26 7 (A)   • MCHC 08/25/2022 30 5 (A)   • RDW 08/25/2022 12 7    • MPV 08/25/2022 11 6    • Platelets 00/58/4937 271    • nRBC 08/25/2022 0    • Neutrophils Relative 08/25/2022 57    • Immat GRANS % 08/25/2022 0    • Lymphocytes Relative 08/25/2022 31    • Monocytes Relative 08/25/2022 9    • Eosinophils Relative 08/25/2022 2    • Basophils Relative 08/25/2022 1    • Neutrophils Absolute 08/25/2022 3 54    • Immature Grans Absolute 08/25/2022 0 01    • Lymphocytes Absolute 08/25/2022 1 89    • Monocytes Absolute 08/25/2022 0 53    • Eosinophils Absolute 08/25/2022 0 13    • Basophils Absolute 08/25/2022 0 03    • Sodium 08/25/2022 138    • Potassium 08/25/2022 4 0    • Chloride 08/25/2022 109 (A)   • CO2 08/25/2022 27    • ANION GAP 08/25/2022 2 (A)   • BUN 08/25/2022 11    • Creatinine 08/25/2022 0 77    • Glucose, Fasting 08/25/2022 89    • Calcium 08/25/2022 8 8    • Corrected Calcium 08/25/2022 9 3    • AST 08/25/2022 14    • ALT 08/25/2022 19    • Alkaline Phosphatase 08/25/2022 52    • Total Protein 08/25/2022 8 0    • Albumin 08/25/2022 3 4 (A)   • Total Bilirubin 08/25/2022 0 26    • Testosterone, Free 08/25/2022 2 0    • TESTOSTERONE TOTAL 08/25/2022 13    • Estradiol 08/25/2022 96 0 (A)        Imaging: No results found  Objective:     Physical Exam      There are no Patient Instructions on file for this visit  CELIA Farooq    Portions of the record may have been created with voice recognition software  Occasional wrong word or "sound a like" substitutions may have occurred due to the inherent limitations of voice recognition software  Read the chart carefully and recognize, using context, where substitutions have occurred

## 2022-12-09 ENCOUNTER — APPOINTMENT (OUTPATIENT)
Dept: LAB | Facility: CLINIC | Age: 22
End: 2022-12-09

## 2022-12-09 DIAGNOSIS — F64.0 TRANSSEXUALISM: ICD-10-CM

## 2022-12-09 LAB
ALBUMIN SERPL BCP-MCNC: 3.5 G/DL (ref 3.5–5)
ALP SERPL-CCNC: 61 U/L (ref 46–116)
ALT SERPL W P-5'-P-CCNC: 16 U/L (ref 12–78)
ANION GAP SERPL CALCULATED.3IONS-SCNC: 5 MMOL/L (ref 4–13)
AST SERPL W P-5'-P-CCNC: 14 U/L (ref 5–45)
BASOPHILS # BLD AUTO: 0.04 THOUSANDS/ÂΜL (ref 0–0.1)
BASOPHILS NFR BLD AUTO: 1 % (ref 0–1)
BILIRUB SERPL-MCNC: 0.52 MG/DL (ref 0.2–1)
BUN SERPL-MCNC: 11 MG/DL (ref 5–25)
CALCIUM SERPL-MCNC: 9 MG/DL (ref 8.3–10.1)
CHLORIDE SERPL-SCNC: 105 MMOL/L (ref 96–108)
CO2 SERPL-SCNC: 27 MMOL/L (ref 21–32)
CREAT SERPL-MCNC: 0.71 MG/DL (ref 0.6–1.3)
EOSINOPHIL # BLD AUTO: 0.09 THOUSAND/ÂΜL (ref 0–0.61)
EOSINOPHIL NFR BLD AUTO: 1 % (ref 0–6)
ERYTHROCYTE [DISTWIDTH] IN BLOOD BY AUTOMATED COUNT: 13 % (ref 11.6–15.1)
ESTRADIOL SERPL-MCNC: 265 PG/ML (ref 11–52.5)
GLUCOSE P FAST SERPL-MCNC: 82 MG/DL (ref 65–99)
HCT VFR BLD AUTO: 39.5 % (ref 36.5–46.1)
HGB BLD-MCNC: 12.1 G/DL (ref 12–15.4)
IMM GRANULOCYTES # BLD AUTO: 0.02 THOUSAND/UL (ref 0–0.2)
IMM GRANULOCYTES NFR BLD AUTO: 0 % (ref 0–2)
LYMPHOCYTES # BLD AUTO: 1.84 THOUSANDS/ÂΜL (ref 0.6–4.47)
LYMPHOCYTES NFR BLD AUTO: 26 % (ref 14–44)
MCH RBC QN AUTO: 26 PG (ref 26.8–34.3)
MCHC RBC AUTO-ENTMCNC: 30.6 G/DL (ref 31.4–37.4)
MCV RBC AUTO: 85 FL (ref 82–98)
MONOCYTES # BLD AUTO: 0.5 THOUSAND/ÂΜL (ref 0.17–1.22)
MONOCYTES NFR BLD AUTO: 7 % (ref 4–12)
NEUTROPHILS # BLD AUTO: 4.48 THOUSANDS/ÂΜL (ref 1.85–7.62)
NEUTS SEG NFR BLD AUTO: 65 % (ref 43–75)
NRBC BLD AUTO-RTO: 0 /100 WBCS
PLATELET # BLD AUTO: 257 THOUSANDS/UL (ref 149–390)
PMV BLD AUTO: 12.2 FL (ref 8.9–12.7)
POTASSIUM SERPL-SCNC: 4 MMOL/L (ref 3.5–5.3)
PROT SERPL-MCNC: 7.5 G/DL (ref 6.4–8.4)
RBC # BLD AUTO: 4.65 MILLION/UL (ref 3.88–5.12)
SODIUM SERPL-SCNC: 137 MMOL/L (ref 135–147)
TESTOST SERPL-MCNC: 62 NG/DL (ref 113–1065)
WBC # BLD AUTO: 6.97 THOUSAND/UL (ref 4.31–10.16)

## 2023-02-03 ENCOUNTER — HOSPITAL ENCOUNTER (EMERGENCY)
Facility: HOSPITAL | Age: 23
Discharge: HOME | End: 2023-02-03

## 2023-02-03 VITALS
HEIGHT: 65 IN | RESPIRATION RATE: 18 BRPM | BODY MASS INDEX: 34.82 KG/M2 | SYSTOLIC BLOOD PRESSURE: 141 MMHG | WEIGHT: 209 LBS | HEART RATE: 86 BPM | OXYGEN SATURATION: 98 % | TEMPERATURE: 98 F | DIASTOLIC BLOOD PRESSURE: 83 MMHG

## 2023-02-03 DIAGNOSIS — J02.9 VIRAL PHARYNGITIS: Primary | ICD-10-CM

## 2023-02-03 LAB
FLUAV RNA RESP QL NAA+PROBE: NEGATIVE
FLUBV RNA RESP QL NAA+PROBE: NEGATIVE
RSV RNA RESP QL NAA+PROBE: NEGATIVE
SARS-COV-2 RNA RESP QL NAA+PROBE: NEGATIVE

## 2023-02-03 RX ORDER — LIDOCAINE HYDROCHLORIDE 20 MG/ML
15 SOLUTION OROPHARYNGEAL ONCE
Status: COMPLETED | OUTPATIENT
Start: 2023-02-03 | End: 2023-02-03

## 2023-02-03 RX ADMIN — LIDOCAINE HYDROCHLORIDE 15 ML: 20 SOLUTION ORAL; TOPICAL at 07:49

## 2023-02-03 RX ADMIN — DEXAMETHASONE SODIUM PHOSPHATE 8 MG: 10 INJECTION, SOLUTION INTRAMUSCULAR; INTRAVENOUS at 07:49

## 2023-02-03 NOTE — ED NOTES
Patient discharged by provider  Patient ambulated out of department, steady gait, vss        Youlanda Lanes, RN  02/03/23 1565

## 2023-02-03 NOTE — ED PROVIDER NOTES
History  Chief Complaint   Patient presents with   • Flu Symptoms     C/o nasal congestion, cough and vomiting     20 y o  adult with past medical history significant for REESE, currently on testosterone for gender transition, and anxiety presents to ED with chief complaint of congestion, cough, vomiting x1  Onset of symptoms reported as 2 days ago  Location of symptoms reported as throat and nose  Quality is reported as sore throat and nasal congestion  Severity is reported as moderate  Associated symptoms: Belching with vomiting overnight x1  Diarrhea  Modifying factors: Throat lozenges help with sore throat  Context: Patient presents today with cough, congestion, odynophagia with tightness in the throat, and 1 episode of vomiting (no blood) overnight  Denies any sick contacts or recent travel  Denies exposure to any known allergens  Couple reflux episodes after severe coughing fit  The patient was treating at home with cough drops, but did not use any acetaminophen or cough medicine  Patient denies fevers, wheezing or trouble breathing, or chest pain  Has had couple episodes of diarrhea over the past 2 days as well, but denies any blood in the stool and denies abdominal pain  Denies drooling or choking sensation  Flu Symptoms  Presenting symptoms: cough, diarrhea, rhinorrhea, sore throat and vomiting    Presenting symptoms: no fever, no headaches, no myalgias, no nausea and no shortness of breath    Associated symptoms: nasal congestion    Associated symptoms: no chills, no ear pain and no neck stiffness        Prior to Admission Medications   Prescriptions Last Dose Informant Patient Reported? Taking?    ARIPiprazole (ABILIFY) 5 mg tablet   No No   Sig: Take 1 tablet (5 mg total) by mouth daily for 15 days At 9am   Ashlyna 0 15-0 03 &0 01 MG TABS   Yes No   Sig: Take 1 tablet by mouth daily   Levonorgest-Eth Estrad 91-Day 0 15-0 03 &0 01 MG TABS   Yes No   Sig: Take 1 tablet by mouth daily cetirizine (ZyrTEC) 10 mg tablet   No No   Sig: Take 1 tablet (10 mg total) by mouth daily at bedtime   ferrous sulfate 325 (65 Fe) mg tablet   No No   Sig: Take 1 tablet (325 mg total) by mouth daily with breakfast   Patient not taking: Reported on 11/3/2022   fluticasone (FLONASE) 50 mcg/act nasal spray   No No   Si spray into each nostril daily   ondansetron (ZOFRAN-ODT) 4 mg disintegrating tablet   No No   Sig: Take 1 tablet (4 mg total) by mouth every 6 (six) hours as needed for nausea or vomiting   prazosin (MINIPRESS) 2 mg capsule   No No   Sig: Take 1 capsule (2 mg total) by mouth daily at bedtime   Patient taking differently: Take 1 mg by mouth daily at bedtime   sertraline (ZOLOFT) 100 mg tablet   No No   Sig: Take 1 tablet (100 mg total) by mouth daily for 15 days At 9am   Patient taking differently: Take 100 mg by mouth daily 150mg daily   testosterone cypionate (DEPO-TESTOSTERONE) 200 mg/mL SOLN   Yes No   Sig: INJECT 1 CC INTRAMUSCULARLY EVERY 14 DAYS      Facility-Administered Medications: None       Past Medical History:   Diagnosis Date   • Anxiety    • Depression    • History of psychiatric hospitalization    • Panic attack    • Psychiatric disorder    • Suicidal ideations    • [facility]        History reviewed  No pertinent surgical history  History reviewed  No pertinent family history  I have reviewed and agree with the history as documented  E-Cigarette/Vaping   • E-Cigarette Use Never User      E-Cigarette/Vaping Substances   • Nicotine No    • THC No    • CBD No    • Flavoring No    • Other No    • Unknown No      Social History     Tobacco Use   • Smoking status: Never   • Smokeless tobacco: Never   Vaping Use   • Vaping Use: Never used   Substance Use Topics   • Alcohol use: Yes   • Drug use: No       Review of Systems   Constitutional: Negative for chills, diaphoresis and fever     HENT: Positive for congestion, postnasal drip, rhinorrhea, sore throat and voice change (Hoarseness)  Negative for drooling, ear pain, facial swelling, nosebleeds, sinus pressure, sinus pain, sneezing and trouble swallowing  Eyes: Negative for pain, redness and itching  Respiratory: Positive for cough  Negative for choking, chest tightness, shortness of breath, wheezing and stridor  Cardiovascular: Negative for chest pain and palpitations  Gastrointestinal: Positive for diarrhea and vomiting  Negative for abdominal pain, blood in stool, constipation and nausea  Genitourinary: Negative for dysuria  Musculoskeletal: Negative for arthralgias, gait problem, myalgias, neck pain and neck stiffness  Skin: Negative for rash  Neurological: Negative for syncope, facial asymmetry, weakness, light-headedness and headaches  Hematological: Negative for adenopathy  Psychiatric/Behavioral: The patient is nervous/anxious  All other systems reviewed and are negative  Physical Exam  Physical Exam  Vitals and nursing note reviewed  Constitutional:       General: He is awake  He is not in acute distress  Appearance: Normal appearance  He is not toxic-appearing  HENT:      Head: Normocephalic and atraumatic  Jaw: No tenderness or pain on movement  Salivary Glands: Right salivary gland is not diffusely enlarged or tender  Left salivary gland is not diffusely enlarged or tender  Right Ear: Hearing, tympanic membrane, ear canal and external ear normal       Left Ear: Hearing, tympanic membrane, ear canal and external ear normal       Nose: Mucosal edema, congestion and rhinorrhea present  Rhinorrhea is clear  Right Nostril: No epistaxis, septal hematoma or occlusion  Left Nostril: No epistaxis, septal hematoma or occlusion  Right Turbinates: Swollen  Not pale  Left Turbinates: Swollen  Not pale  Right Sinus: No maxillary sinus tenderness or frontal sinus tenderness  Left Sinus: No maxillary sinus tenderness or frontal sinus tenderness  Mouth/Throat:      Lips: Pink  Mouth: Mucous membranes are moist  No oral lesions or angioedema  Dentition: No gingival swelling, dental abscesses or gum lesions  Tongue: No lesions  Tongue does not deviate from midline  Palate: No mass and lesions  Pharynx: Uvula midline  Posterior oropharyngeal erythema present  No pharyngeal swelling, oropharyngeal exudate or uvula swelling  Tonsils: No tonsillar exudate or tonsillar abscesses  Eyes:      General: Lids are normal  Vision grossly intact  No scleral icterus  Right eye: No discharge  Left eye: No discharge  Extraocular Movements: Extraocular movements intact  Conjunctiva/sclera: Conjunctivae normal       Pupils: Pupils are equal, round, and reactive to light  Neck:      Thyroid: No thyroid mass, thyromegaly or thyroid tenderness  Trachea: No tracheal tenderness, abnormal tracheal secretions or tracheal deviation  Cardiovascular:      Rate and Rhythm: Normal rate and regular rhythm  Pulses:           Radial pulses are 2+ on the right side and 2+ on the left side  Heart sounds: Normal heart sounds  Pulmonary:      Effort: Pulmonary effort is normal  No tachypnea, accessory muscle usage, respiratory distress or retractions  Breath sounds: Normal breath sounds and air entry  Abdominal:      General: Bowel sounds are normal  There is no distension  Palpations: Abdomen is soft  Tenderness: There is no abdominal tenderness  Musculoskeletal:         General: No tenderness, deformity or signs of injury  Cervical back: Normal range of motion and neck supple  No edema, erythema or crepitus  No pain with movement  Lymphadenopathy:      Head:      Right side of head: No submental, submandibular, tonsillar, preauricular, posterior auricular or occipital adenopathy  Left side of head: Submandibular adenopathy present   No submental, tonsillar, posterior auricular or occipital adenopathy  Skin:     General: Skin is warm and dry  Capillary Refill: Capillary refill takes less than 2 seconds  Coloration: Skin is not jaundiced  Findings: No erythema or rash  Neurological:      General: No focal deficit present  Mental Status: He is alert and oriented to person, place, and time  Mental status is at baseline  Gait: Gait normal    Psychiatric:         Attention and Perception: Attention normal          Mood and Affect: Mood is not anxious  Affect is tearful  Speech: Speech normal          Behavior: Behavior normal  Behavior is cooperative  Thought Content: Thought content normal          Cognition and Memory: Cognition and memory normal          Judgment: Judgment normal          Vital Signs  ED Triage Vitals [02/03/23 0712]   Temperature Pulse Respirations Blood Pressure SpO2   98 °F (36 7 °C) 86 18 141/83 98 %      Temp Source Heart Rate Source Patient Position - Orthostatic VS BP Location FiO2 (%)   Tympanic Monitor Sitting Left arm --      Pain Score       --           Vitals:    02/03/23 0712   BP: 141/83   Pulse: 86   Patient Position - Orthostatic VS: Sitting         Visual Acuity      ED Medications  Medications   dexamethasone oral liquid 8 mg 0 8 mL (has no administration in time range)   Lidocaine Viscous HCl (XYLOCAINE) 2 % mucosal solution 15 mL (has no administration in time range)       Diagnostic Studies  Results Reviewed     Procedure Component Value Units Date/Time    FLU/RSV/COVID - if FLU/RSV clinically relevant [[id number]] Collected: 02/03/23 0728    Lab Status: In process Specimen: Nares from Nose Updated: 02/03/23 0730                 No orders to display              Procedures  Procedures         ED Course                               SBIRT 22yo+    Flowsheet Row Most Recent Value   SBIRT (23 yo +)    In order to provide better care to our patients, we are screening all of our patients for alcohol and drug use  Would it be okay to ask you these screening questions? Yes Filed at: 02/03/2023 7696   Initial Alcohol Screen: US AUDIT-C     1  How often do you have a drink containing alcohol? 0 Filed at: 02/03/2023 0715   2  How many drinks containing alcohol do you have on a typical day you are drinking? 0 Filed at: 02/03/2023 0715   3a  Male UNDER 65: How often do you have five or more drinks on one occasion? 0 Filed at: 02/03/2023 0715   Audit-C Score 0 Filed at: 02/03/2023 0309   BRENDA: How many times in the past year have you    Used an illegal drug or used a prescription medication for non-medical reasons? Never Filed at: 02/03/2023 0715                    Medical Decision Making  MDM    DDX: COVID, flu, URI, viral pharyngitis    Epiglottitis unlikely to the patient able to maintain secretions, no stridor on exam   Strep throat unlikely as patient has no fever, has cough present, and no tonsillar exudates  Up-to-date on childhood vaccines  ED Plan: Respiratory viral panel, Decadron, viscous lidocaine swish and swallow  ED results:   I have reviewed the patient's vital signs, nursing notes, and other relevant ancillary testing/information  I have had a detailed discussion with the patient regarding the historical points, examination findings, and any diagnostic results    Relevant results: Respiratory viral panel negative  ED coarse/summary: Patient presents with symptoms of viral pharyngitis  Cough is worse at night with postnasal drip  No fevers, no stridor, and able to maintain secretions which would suggest epiglottitis  Patient became tearful during encounter and stated his throat felt tight at that moment  Performed deep breathing and the throat tightness improved  Had 1 episode of vomiting overnight which was mucous in nature  COVID flu RSV swab obtained and sent to lab, which was negative    Patient given a dose of Decadron and viscous lidocaine swish and swallow for the throat irritation, which patient reports some relief  I discussed diagnosis and treatment plan with patient at bedside  Recommended retesting for COVID with a home test in 2 to 3 days  Explained given a dose of steroids here in the ED and it should work over the next couple days to help with her sore throat symptoms  Extended discussion regarding the diagnosis, pathophysiology, expectant coarse and treatment plan  Instructed to follow up with PCP in 3 to 5 days  Reviewed reasons to return to ED  There was verbalized understanding of diagnosis and agreement with discharge plan of care as well as understanding of reasons to return to ED  Viral pharyngitis: acute illness or injury  Risk  Prescription drug management  Disposition  Final diagnoses:   None     ED Disposition     None      Follow-up Information    None         Patient's Medications   Discharge Prescriptions    No medications on file       No discharge procedures on file      PDMP Review     None          ED Provider  Electronically Signed by           Fay Hines PA-C  02/03/23 0825       [address]  TEJ SMITH  02/03/23 4455

## 2023-02-03 NOTE — DISCHARGE INSTRUCTIONS
Your COVID/flu/RSV was negative here  However he may still have COVID so test with a home test in 2 to 3 days  You have been given a dose of steroids here in the ED and that should work over the next 2 to 3 days to help with your sore throat  Use Tylenol or ibuprofen for your sore throat pain  Plenty of fluids and rest, head of the bed elevated to promote drainage at night  Can try some honey to help soothe your throat, and humidification either with humidifier or steamy shower  Return to the ED if you have fever, shortness of breath, wheezing, chest pain, or you feel your symptoms are worsening and need to be seen  Otherwise follow-up with your family doctor in 3 to 5 days

## 2023-02-07 ENCOUNTER — HOSPITAL ENCOUNTER (EMERGENCY)
Facility: HOSPITAL | Age: 23
Discharge: HOME | End: 2023-02-07
Attending: EMERGENCY MEDICINE

## 2023-02-07 VITALS
DIASTOLIC BLOOD PRESSURE: 77 MMHG | TEMPERATURE: 98.2 F | HEART RATE: 66 BPM | OXYGEN SATURATION: 95 % | RESPIRATION RATE: 18 BRPM | SYSTOLIC BLOOD PRESSURE: 129 MMHG

## 2023-02-07 DIAGNOSIS — B34.9 VIRAL SYNDROME: Primary | ICD-10-CM

## 2023-02-07 DIAGNOSIS — R42 DIZZINESS: ICD-10-CM

## 2023-02-07 LAB
ALBUMIN SERPL BCP-MCNC: 3.5 G/DL (ref 3.5–5)
ALP SERPL-CCNC: 58 U/L (ref 46–116)
ALT SERPL W P-5'-P-CCNC: 6 U/L (ref 12–78)
ANION GAP SERPL CALCULATED.3IONS-SCNC: 5 MMOL/L (ref 4–13)
AST SERPL W P-5'-P-CCNC: 12 U/L (ref 5–45)
BASOPHILS # BLD AUTO: 0.04 THOUSANDS/ÂΜL (ref 0–0.1)
BASOPHILS NFR BLD AUTO: 1 % (ref 0–1)
BILIRUB DIRECT SERPL-MCNC: 0.11 MG/DL (ref 0–0.2)
BILIRUB SERPL-MCNC: 0.46 MG/DL (ref 0.2–1)
BUN SERPL-MCNC: 9 MG/DL (ref 5–25)
CALCIUM SERPL-MCNC: 9 MG/DL (ref 8.3–10.1)
CHLORIDE SERPL-SCNC: 102 MMOL/L (ref 96–108)
CO2 SERPL-SCNC: 30 MMOL/L (ref 21–32)
CREAT SERPL-MCNC: 0.75 MG/DL (ref 0.6–1.3)
EOSINOPHIL # BLD AUTO: 0.11 THOUSAND/ÂΜL (ref 0–0.61)
EOSINOPHIL NFR BLD AUTO: 2 % (ref 0–6)
ERYTHROCYTE [DISTWIDTH] IN BLOOD BY AUTOMATED COUNT: 14.2 % (ref 11.6–15.1)
FLUAV RNA RESP QL NAA+PROBE: NEGATIVE
FLUBV RNA RESP QL NAA+PROBE: NEGATIVE
GLUCOSE SERPL-MCNC: 88 MG/DL (ref 65–140)
HCG SERPL QL: NEGATIVE
HCT VFR BLD AUTO: 42.4 % (ref 36.5–46.1)
HGB BLD-MCNC: 13.1 G/DL (ref 12–15.4)
IMM GRANULOCYTES # BLD AUTO: 0.02 THOUSAND/UL (ref 0–0.2)
IMM GRANULOCYTES NFR BLD AUTO: 0 % (ref 0–2)
LYMPHOCYTES # BLD AUTO: 1.93 THOUSANDS/ÂΜL (ref 0.6–4.47)
LYMPHOCYTES NFR BLD AUTO: 27 % (ref 14–44)
MCH RBC QN AUTO: 26.6 PG (ref 26.8–34.3)
MCHC RBC AUTO-ENTMCNC: 30.9 G/DL (ref 31.4–37.4)
MCV RBC AUTO: 86 FL (ref 82–98)
MONOCYTES # BLD AUTO: 0.52 THOUSAND/ÂΜL (ref 0.17–1.22)
MONOCYTES NFR BLD AUTO: 7 % (ref 4–12)
NEUTROPHILS # BLD AUTO: 4.57 THOUSANDS/ÂΜL (ref 1.85–7.62)
NEUTS SEG NFR BLD AUTO: 63 % (ref 43–75)
NRBC BLD AUTO-RTO: 0 /100 WBCS
PLATELET # BLD AUTO: 249 THOUSANDS/UL (ref 149–390)
PMV BLD AUTO: 11 FL (ref 8.9–12.7)
POTASSIUM SERPL-SCNC: 4.1 MMOL/L (ref 3.5–5.3)
PROT SERPL-MCNC: 7.7 G/DL (ref 6.4–8.4)
RBC # BLD AUTO: 4.92 MILLION/UL (ref 3.88–5.12)
RSV RNA RESP QL NAA+PROBE: NEGATIVE
SARS-COV-2 RNA RESP QL NAA+PROBE: NEGATIVE
SODIUM SERPL-SCNC: 137 MMOL/L (ref 135–147)
WBC # BLD AUTO: 7.19 THOUSAND/UL (ref 4.31–10.16)

## 2023-02-07 RX ORDER — MECLIZINE HYDROCHLORIDE 25 MG/1
25 TABLET ORAL 3 TIMES DAILY PRN
Qty: 30 TABLET | Refills: 0 | Status: SHIPPED | OUTPATIENT
Start: 2023-02-07

## 2023-02-07 RX ADMIN — SODIUM CHLORIDE 1000 ML: 0.9 INJECTION, SOLUTION INTRAVENOUS at 15:54

## 2023-02-07 NOTE — DISCHARGE INSTRUCTIONS
Increase liquids  Meclizine every 8 hours if needed for dizziness  Follow-up with the family provider or return worsening symptoms increasing dizziness, headache, vomiting or any problems

## 2023-02-07 NOTE — ED PROVIDER NOTES
History  Chief Complaint   Patient presents with   • Dizziness     Dizziness and nausea x one week  Seen for same 2 days ago  No active vomiting  HPI patient is a 59-year-old with a recent history of nasal congestion cough and vomiting  Patient was seen here on the third, had a COVID test that was negative, treated with dexamethasone  Onset at that time was 2 days  The primary complaint at that point was sore throat  Patient reports difficulty eating over the last 2 days  The patient reports generalized weakness now and some dizziness  Patient denies any focal weakness  Reports dizziness somewhat with moving around and nausea  Was advised to have a repeat COVID test so return to the emergency department today because not feeling any better  Denies any rash  Patient reports some postnasal drip and congestion  Reports significant nasal discharge  Denies any headache  Denies any chest pain  There is no abdominal pain  The patient reports the vomiting has improved  Past medical history of testosterone for gender transition, history of anxiety,  Family history noncontributory  Social history smoker no history of drug abuse    Prior to Admission Medications   Prescriptions Last Dose Informant Patient Reported? Taking?    ARIPiprazole (ABILIFY) 5 mg tablet   No No   Sig: Take 1 tablet (5 mg total) by mouth daily for 15 days At 9am   Ashlyna 0 15-0 03 &0 01 MG TABS   Yes No   Sig: Take 1 tablet by mouth daily   Levonorgest-Eth Estrad -Day 0 15-0 03 &0 01 MG TABS   Yes No   Sig: Take 1 tablet by mouth daily   cetirizine (ZyrTEC) 10 mg tablet   No No   Sig: Take 1 tablet (10 mg total) by mouth daily at bedtime   ferrous sulfate 325 (65 Fe) mg tablet   No No   Sig: Take 1 tablet (325 mg total) by mouth daily with breakfast   Patient not taking: Reported on 11/3/2022   fluticasone (FLONASE) 50 mcg/act nasal spray   No No   Si spray into each nostril daily   ondansetron (ZOFRAN-ODT) 4 mg disintegrating tablet   No No   Sig: Take 1 tablet (4 mg total) by mouth every 6 (six) hours as needed for nausea or vomiting   prazosin (MINIPRESS) 2 mg capsule   No No   Sig: Take 1 capsule (2 mg total) by mouth daily at bedtime   Patient taking differently: Take 1 mg by mouth daily at bedtime   sertraline (ZOLOFT) 100 mg tablet   No No   Sig: Take 1 tablet (100 mg total) by mouth daily for 15 days At 9am   Patient taking differently: Take 100 mg by mouth daily 150mg daily   testosterone cypionate (DEPO-TESTOSTERONE) 200 mg/mL SOLN   Yes No   Sig: INJECT 1 CC INTRAMUSCULARLY EVERY 14 DAYS      Facility-Administered Medications: None       Past Medical History:   Diagnosis Date   • Anxiety    • Depression    • History of psychiatric hospitalization    • Panic attack    • Psychiatric disorder    • Suicidal ideations    • Suicide attempt ([facility] [address] ) 2016       No past surgical history on file  No family history on file  I have reviewed and agree with the history as documented  E-Cigarette/Vaping   • E-Cigarette Use Never User      E-Cigarette/Vaping Substances   • Nicotine No    • THC No    • CBD No    • Flavoring No    • Other No    • Unknown No      Social History     Tobacco Use   • Smoking status: Never   • Smokeless tobacco: Never   Vaping Use   • Vaping Use: Never used   Substance Use Topics   • Alcohol use: Yes   • Drug use: No       Review of Systems   Constitutional: Negative for diaphoresis, fatigue and fever  HENT: Positive for congestion and postnasal drip  Negative for ear pain, nosebleeds and sore throat  Eyes: Negative for photophobia, pain, discharge and visual disturbance  Respiratory: Negative for cough, choking, chest tightness, shortness of breath and wheezing  Cardiovascular: Negative for chest pain and palpitations  Gastrointestinal: Negative for abdominal distention, abdominal pain, diarrhea and vomiting  Genitourinary: Negative for dysuria, flank pain and frequency     Musculoskeletal: Negative for back pain, gait problem and joint swelling  Skin: Negative for color change and rash  Neurological: Positive for dizziness and weakness  Negative for syncope and headaches  Psychiatric/Behavioral: Negative for behavioral problems and confusion  The patient is not nervous/anxious  All other systems reviewed and are negative  Physical Exam  Physical Exam  Vitals and nursing note reviewed  Constitutional:       Appearance: He is well-developed  HENT:      Head: Normocephalic  Right Ear: External ear normal       Left Ear: External ear normal       Nose: Nose normal    Eyes:      General: Lids are normal       Pupils: Pupils are equal, round, and reactive to light  Cardiovascular:      Rate and Rhythm: Normal rate and regular rhythm  Pulses: Normal pulses  Heart sounds: Normal heart sounds  Pulmonary:      Effort: Pulmonary effort is normal  No respiratory distress  Breath sounds: Normal breath sounds  Abdominal:      General: Abdomen is flat  Bowel sounds are normal       Tenderness: There is no abdominal tenderness  Musculoskeletal:         General: No deformity  Normal range of motion  Cervical back: Normal range of motion and neck supple  Skin:     General: Skin is warm and dry  Neurological:      General: No focal deficit present  Mental Status: He is alert and oriented to person, place, and time  GCS: GCS eye subscore is 4  GCS verbal subscore is 5  GCS motor subscore is 6  Cranial Nerves: Cranial nerves 2-12 are intact  Sensory: Sensation is intact  Motor: Motor function is intact  Coordination: Coordination is intact        Comments: No focal weakness,   Psychiatric:         Mood and Affect: Mood normal          Vital Signs  ED Triage Vitals   Temperature Pulse Respirations Blood Pressure SpO2   02/07/23 1458 02/07/23 1459 02/07/23 1459 02/07/23 1459 02/07/23 1459   98 2 °F (36 8 °C) 75 18 125/60 99 %      Temp src Heart Rate Source Patient Position - Orthostatic VS BP Location FiO2 (%)   -- -- 02/07/23 1702 02/07/23 1702 --     Lying Right arm       Pain Score       --                  Vitals:    02/07/23 1459 02/07/23 1702   BP: 125/60 129/77   Pulse: 75 66   Patient Position - Orthostatic VS:  Lying         Visual Acuity      ED Medications  Medications   sodium chloride 0 9 % bolus 1,000 mL (0 mL Intravenous Stopped 2/7/23 1654)       Diagnostic Studies  Results Reviewed     Procedure Component Value Units Date/Time    Hepatic function panel [[id number]]  (Abnormal) Collected: 02/07/23 1549    Lab Status: Final result Specimen: Blood from Arm, Left Updated: 02/07/23 1643     Total Bilirubin 0 46 mg/dL      Bilirubin, Direct 0 11 mg/dL      Alkaline Phosphatase 58 U/L      AST 12 U/L      ALT 6 U/L      Total Protein 7 7 g/dL      Albumin 3 5 g/dL     FLU/RSV/COVID - if FLU/RSV clinically relevant [[id number]]  (Normal) Collected: 02/07/23 1549    Lab Status: Final result Specimen: Nares from Nose Updated: 02/07/23 1643     SARS-CoV-2 Negative     INFLUENZA A PCR Negative     INFLUENZA B PCR Negative     RSV PCR Negative    Narrative:      FOR PEDIATRIC PATIENTS - copy/paste COVID Guidelines URL to browser: [email or web address]  [employer]    SARS-CoV-2 assay is a Nucleic Acid Amplification assay intended for the  qualitative detection of nucleic acid from SARS-CoV-2 in nasopharyngeal  swabs  Results are for the presumptive identification of SARS-CoV-2 RNA  Positive results are indicative of infection with SARS-CoV-2, the virus  causing COVID-19, but do not rule out bacterial infection or co-infection  with other viruses  Laboratories within the [place] and its  territories are required to report all positive results to the appropriate  public health authorities   Negative results do not preclude SARS-CoV-2  infection and should not be used as the sole basis for treatment or other  patient management decisions  Negative results must be combined with  clinical observations, patient history, and epidemiological information  This test has not been FDA cleared or approved  This test has been authorized by FDA under an Emergency Use Authorization  (EUA)  This test is only authorized for the duration of time the  declaration that circumstances exist justifying the authorization of the  emergency use of an in vitro diagnostic tests for detection of SARS-CoV-2  virus and/or diagnosis of COVID-19 infection under section 564(b)(1) of  the Act, 21 U  S C  255NCC-0(E)(2), unless the authorization is terminated  or revoked sooner  The test has been validated but independent review by FDA  and CLIA is pending  Test performed using [employer] GeneXpert: This RT-PCR assay targets N2,  a region unique to SARS-CoV-2  A conserved region in the E-gene was chosen  for pan-Sarbecovirus detection which includes SARS-CoV-2  According to CMS-2020-01-R, this platform meets the definition of high-throughput technology  hCG, qualitative pregnancy [[id number]]  (Normal) Collected: 02/07/23 1549    Lab Status: Final result Specimen: Blood from Arm, Left Updated: 02/07/23 1643     Preg, Serum Negative    Basic metabolic panel [[id number]] Collected: 02/07/23 1549    Lab Status: Final result Specimen: Blood from Arm, Left Updated: 02/07/23 1627     Sodium 137 mmol/L      Potassium 4 1 mmol/L      Chloride 102 mmol/L      CO2 30 mmol/L      ANION GAP 5 mmol/L      BUN 9 mg/dL      Creatinine 0 75 mg/dL      Glucose 88 mg/dL      Calcium 9 0 mg/dL      eGFR --    Narrative:      Notes:     1  eGFR calculation is only valid for adults 18 years and older  2  EGFR calculation cannot be performed for patients who are transgender, non-binary, or whose legal sex, sex at birth, and gender identity differ      CBC and differential [[id number]]  (Abnormal) Collected: 02/07/23 1549    Lab Status: Final result Specimen: Blood from Arm, Left Updated: 02/07/23 1559     WBC 7 19 Thousand/uL      RBC 4 92 Million/uL      Hemoglobin 13 1 g/dL      Hematocrit 42 4 %      MCV 86 fL      MCH 26 6 pg      MCHC 30 9 g/dL      RDW 14 2 %      MPV 11 0 fL      Platelets 365 Thousands/uL      nRBC 0 /100 WBCs      Neutrophils Relative 63 %      Immat GRANS % 0 %      Lymphocytes Relative 27 %      Monocytes Relative 7 %      Eosinophils Relative 2 %      Basophils Relative 1 %      Neutrophils Absolute 4 57 Thousands/µL      Immature Grans Absolute 0 02 Thousand/uL      Lymphocytes Absolute 1 93 Thousands/µL      Monocytes Absolute 0 52 Thousand/µL      Eosinophils Absolute 0 11 Thousand/µL      Basophils Absolute 0 04 Thousands/µL                  No orders to display              Procedures  Procedures         ED Course       Diagnostic testing liver functions were normal   Recent test was negative  Electrolytes were within normal limits  White count was normal at 7000 no sign of inflammation hemoglobin is normal 13 no sign of anemia  COVID testing influenza testing RSV testing were negative  Stroke Assessment     Row Name 02/07/23 1730             NIH Stroke Scale    Interval --      Level of Consciousness (1a ) 0      LOC Questions (1b ) 0      LOC Commands (1c ) 0      Best Gaze (2 ) 0      Visual (3 ) 0      Facial Palsy (4 ) 0      Motor Arm, Left (5a ) 0      Motor Arm, Right (5b ) 0      Motor Leg, Left (6a ) 0      Motor Leg, Right (6b ) 0      Limb Ataxia (7 ) 0      Sensory (8 ) 0      Best Language (9 ) 0      Dysarthria (10 ) 0      Extinction and Inattention (11 ) (Formerly Neglect) 0      Total 0              Flowsheet Row Most Recent Value   Thrombolytic Decision Options    Thrombolytic Decision Patient not a candidate                                    Medical Decision Making  Medical decision making 20year-old patient presents emergency department with recent visit for your throat and diffuse body aches now with dizziness seems to be worse with movement and with standing  Patient received IV fluids with some improvement possibly some dehydration  Laboratory testing was within normal limits  Viral testing was negative  Discussed with patient probably a virus other than the ones that we tested for  There was no focal weakness  Patient could stand and ambulate  Patient is 20years old low risk for TIA or stroke  NIH score would be 0  Discussed with patient most consistent with peripheral vertigo  Discussed outpatient treatment and follow-up  Dizziness: acute illness or injury  Viral syndrome: acute illness or injury  Amount and/or Complexity of Data Reviewed  Labs: ordered  Disposition  Final diagnoses:   Viral syndrome   Dizziness     Time reflects when diagnosis was documented in both MDM as applicable and the Disposition within this note     Time User Action Codes Description Comment    2/7/2023  4:48 PM Aleyda Araya Add [B34 9] Viral syndrome     2/7/2023  4:48 PM Aleyda Araya Add [R42] Dizziness       ED Disposition     ED Disposition   Discharge    Condition   Stable    Date/Time   Tue Feb 7, 2023  4:47 PM    [address] discharge to home/self care  Follow-up Information     Follow up With Specialties Details Why Contact Info    Socrates Felipe, [address] Snow, Nurse Practitioner   Limon Ozzy hPilip [address] [place] [address]  [phone]            Discharge Medication List as of 2/7/2023  4:49 PM      START taking these medications    Details   meclizine (ANTIVERT) 25 mg tablet Take 1 tablet (25 mg total) by mouth 3 (three) times a day as needed for dizziness, Starting Tue 2/7/2023, Normal         CONTINUE these medications which have NOT CHANGED    Details   ARIPiprazole (ABILIFY) 5 mg tablet Take 1 tablet (5 mg total) by mouth daily for 15 days At 9am, Starting Tue 7/13/2021, Until Wed 7/28/2021, Print      !!  Ashlyna 0 15-0 03 &0 01 MG TABS Take 1 tablet by mouth daily, Starting Tue 10/25/2022, Historical Med      cetirizine (ZyrTEC) 10 mg tablet Take 1 tablet (10 mg total) by mouth daily at bedtime, Starting Thu 11/3/2022, Normal      ferrous sulfate 325 (65 Fe) mg tablet Take 1 tablet (325 mg total) by mouth daily with breakfast, Starting Fri 1/15/2021, Print      fluticasone (FLONASE) 50 mcg/act nasal spray 1 spray into each nostril daily, Starting Thu 11/3/2022, Normal      !! Levonorgest-Eth Estrad 91-Day 0 15-0 03 &0 01 MG TABS Take 1 tablet by mouth daily, Starting Sun 7/24/2022, Historical Med      ondansetron (ZOFRAN-ODT) 4 mg disintegrating tablet Take 1 tablet (4 mg total) by mouth every 6 (six) hours as needed for nausea or vomiting, Starting Thu 6/3/2021, Print      prazosin (MINIPRESS) 2 mg capsule Take 1 capsule (2 mg total) by mouth daily at bedtime, Starting Tue 7/13/2021, Print      sertraline (ZOLOFT) 100 mg tablet Take 1 tablet (100 mg total) by mouth daily for 15 days At 9am, Starting Tue 7/13/2021, Until Thu 11/3/2022, Print      testosterone cypionate (DEPO-TESTOSTERONE) 200 mg/mL SOLN INJECT 1 CC INTRAMUSCULARLY EVERY 14 DAYS, Historical Med       !! - Potential duplicate medications found  Please discuss with provider  No discharge procedures on file      PDMP Review     None          ED Provider  Electronically Signed by           Melany Ramsey MD  02/07/23 8594

## 2023-02-24 ENCOUNTER — APPOINTMENT (OUTPATIENT)
Dept: LAB | Facility: CLINIC | Age: 23
End: 2023-02-24

## 2023-02-24 DIAGNOSIS — F64.0 TRANSSEXUALISM: ICD-10-CM

## 2023-02-24 LAB
BASOPHILS # BLD AUTO: 0.03 THOUSANDS/ÂΜL (ref 0–0.1)
BASOPHILS NFR BLD AUTO: 1 % (ref 0–1)
EOSINOPHIL # BLD AUTO: 0.14 THOUSAND/ÂΜL (ref 0–0.61)
EOSINOPHIL NFR BLD AUTO: 2 % (ref 0–6)
ERYTHROCYTE [DISTWIDTH] IN BLOOD BY AUTOMATED COUNT: 14 % (ref 11.6–15.1)
HCT VFR BLD AUTO: 42.5 % (ref 36.5–46.1)
HGB BLD-MCNC: 12.5 G/DL (ref 12–15.4)
IMM GRANULOCYTES # BLD AUTO: 0.02 THOUSAND/UL (ref 0–0.2)
IMM GRANULOCYTES NFR BLD AUTO: 0 % (ref 0–2)
LYMPHOCYTES # BLD AUTO: 1.97 THOUSANDS/ÂΜL (ref 0.6–4.47)
LYMPHOCYTES NFR BLD AUTO: 31 % (ref 14–44)
MCH RBC QN AUTO: 25.9 PG (ref 26.8–34.3)
MCHC RBC AUTO-ENTMCNC: 29.4 G/DL (ref 31.4–37.4)
MCV RBC AUTO: 88 FL (ref 82–98)
MONOCYTES # BLD AUTO: 0.59 THOUSAND/ÂΜL (ref 0.17–1.22)
MONOCYTES NFR BLD AUTO: 9 % (ref 4–12)
NEUTROPHILS # BLD AUTO: 3.59 THOUSANDS/ÂΜL (ref 1.85–7.62)
NEUTS SEG NFR BLD AUTO: 57 % (ref 43–75)
NRBC BLD AUTO-RTO: 0 /100 WBCS
PLATELET # BLD AUTO: 264 THOUSANDS/UL (ref 149–390)
PMV BLD AUTO: 12 FL (ref 8.9–12.7)
RBC # BLD AUTO: 4.83 MILLION/UL (ref 3.88–5.12)
WBC # BLD AUTO: 6.34 THOUSAND/UL (ref 4.31–10.16)

## 2023-02-25 LAB
ALBUMIN SERPL BCP-MCNC: 3.6 G/DL (ref 3.5–5)
ALP SERPL-CCNC: 62 U/L (ref 46–116)
ALT SERPL W P-5'-P-CCNC: 15 U/L (ref 12–78)
ANION GAP SERPL CALCULATED.3IONS-SCNC: 6 MMOL/L (ref 4–13)
AST SERPL W P-5'-P-CCNC: 11 U/L (ref 5–45)
BILIRUB SERPL-MCNC: 0.26 MG/DL (ref 0.2–1)
BUN SERPL-MCNC: 8 MG/DL (ref 5–25)
CALCIUM SERPL-MCNC: 9.2 MG/DL (ref 8.3–10.1)
CHLORIDE SERPL-SCNC: 108 MMOL/L (ref 96–108)
CO2 SERPL-SCNC: 25 MMOL/L (ref 21–32)
CREAT SERPL-MCNC: 0.73 MG/DL (ref 0.6–1.3)
ESTRADIOL SERPL-MCNC: 37 PG/ML (ref 11–52.5)
GLUCOSE P FAST SERPL-MCNC: 96 MG/DL (ref 65–99)
POTASSIUM SERPL-SCNC: 4 MMOL/L (ref 3.5–5.3)
PROT SERPL-MCNC: 7.4 G/DL (ref 6.4–8.4)
SODIUM SERPL-SCNC: 139 MMOL/L (ref 135–147)
TESTOST FREE SERPL-MCNC: 23.8 PG/ML (ref 0–4.2)
TESTOST SERPL-MCNC: 413 NG/DL (ref 13–71)

## 2023-03-16 ENCOUNTER — OFFICE VISIT (OUTPATIENT)
Dept: FAMILY MEDICINE CLINIC | Facility: CLINIC | Age: 23
End: 2023-03-16

## 2023-03-16 VITALS
WEIGHT: 240 LBS | OXYGEN SATURATION: 99 % | BODY MASS INDEX: 39.99 KG/M2 | SYSTOLIC BLOOD PRESSURE: 132 MMHG | HEART RATE: 90 BPM | DIASTOLIC BLOOD PRESSURE: 78 MMHG | HEIGHT: 65 IN | TEMPERATURE: 97.5 F

## 2023-03-16 DIAGNOSIS — F32.A DEPRESSION: ICD-10-CM

## 2023-03-16 DIAGNOSIS — F33.42 RECURRENT MAJOR DEPRESSIVE DISORDER, IN FULL REMISSION (HCC): ICD-10-CM

## 2023-03-16 DIAGNOSIS — F41.0 GENERALIZED ANXIETY DISORDER WITH PANIC ATTACKS: ICD-10-CM

## 2023-03-16 DIAGNOSIS — F41.1 GENERALIZED ANXIETY DISORDER WITH PANIC ATTACKS: ICD-10-CM

## 2023-03-16 DIAGNOSIS — Z00.00 ANNUAL PHYSICAL EXAM: Primary | ICD-10-CM

## 2023-03-16 PROBLEM — R45.851 SUICIDAL IDEATION: Status: RESOLVED | Noted: 2020-09-11 | Resolved: 2023-03-16

## 2023-03-16 PROBLEM — Z00.8 MEDICAL CLEARANCE FOR PSYCHIATRIC ADMISSION: Status: RESOLVED | Noted: 2020-09-11 | Resolved: 2023-03-16

## 2023-03-16 RX ORDER — NEEDLES, DISPOSABLE 25GX5/8"
NEEDLE, DISPOSABLE MISCELLANEOUS
COMMUNITY
Start: 2023-02-09

## 2023-03-16 RX ORDER — SYRINGE WITH NEEDLE, 1 ML 25GX5/8"
SYRINGE, EMPTY DISPOSABLE MISCELLANEOUS
COMMUNITY
Start: 2023-02-28

## 2023-03-16 RX ORDER — LEVONORGESTREL / ETHINYL ESTRADIOL AND ETHINYL ESTRADIOL 150-30(84)
1 KIT ORAL DAILY
COMMUNITY
Start: 2022-10-25 | End: 2023-03-16 | Stop reason: ALTCHOICE

## 2023-03-16 RX ORDER — PRAZOSIN HYDROCHLORIDE 1 MG/1
CAPSULE ORAL
COMMUNITY
Start: 2023-03-08

## 2023-03-16 RX ORDER — LEVONORGESTREL / ETHINYL ESTRADIOL AND ETHINYL ESTRADIOL 150-30(84)
1 KIT ORAL DAILY
Qty: 91 TABLET
Start: 2023-03-16

## 2023-03-16 RX ORDER — SERTRALINE HYDROCHLORIDE 100 MG/1
TABLET, FILM COATED ORAL
Start: 2023-03-16

## 2023-03-16 NOTE — PROGRESS NOTES
[facility] [address] [facility]    NAME: Aurelio Ortiz  AGE: 20 y o  SEX: adult  : 2000     DATE: 3/16/2023     Assessment and Plan:     Problem List Items Addressed This Visit        Other    Depression    Relevant Medications    sertraline (ZOLOFT) 100 mg tablet    Recurrent major depressive disorder (HCC)     Stable  To continue care with psychiatry  Relevant Medications    sertraline (ZOLOFT) 100 mg tablet    Generalized anxiety disorder with panic attacks    Relevant Medications    sertraline (ZOLOFT) 100 mg tablet   Other Visit Diagnoses     Annual physical exam    -  Primary    Relevant Medications    Levonorgest-Eth Estrad 91-Day 0 15-0 03 &0 01 MG TABS          Immunizations and preventive care screenings were discussed with patient today  Appropriate education was printed on patient's after visit summary  Counseling:  Alcohol/drug use: discussed moderation in alcohol intake, the recommendations for healthy alcohol use, and avoidance of illicit drug use  Dental Health: discussed importance of regular tooth brushing, flossing, and dental visits  Injury prevention: discussed safety/seat belts, safety helmets, smoke detectors, carbon dioxide detectors, and smoking near bedding or upholstery  Sexual health: discussed sexually transmitted diseases, partner selection, use of condoms, avoidance of unintended pregnancy, and contraceptive alternatives  · Exercise: the importance of regular exercise/physical activity was discussed  Recommend exercise 3-5 times per week for at least 30 minutes  No follow-ups on file  Chief Complaint:     Chief Complaint   Patient presents with   • Annual Exam      History of Present Illness:     Adult Annual Physical   Patient here for a comprehensive physical exam  The patient reports no problems      Diet and Physical Activity  · Diet/Nutrition: well balanced diet    · Exercise: no formal exercise  Depression Screening  PHQ-2/9 Depression Screening    Little interest or pleasure in doing things: 1 - several days  Feeling down, depressed, or hopeless: 1 - several days  Trouble falling or staying asleep, or sleeping too much: 1 - several days  Feeling tired or having little energy: 0 - not at all  Poor appetite or overeatin - not at all  Feeling bad about yourself - or that you are a failure or have let yourself or your family down: 1 - several days  Trouble concentrating on things, such as reading the newspaper or watching television: 3 - nearly every day  Moving or speaking so slowly that other people could have noticed  Or the opposite - being so fidgety or restless that you have been moving around a lot more than usual: 0 - not at all  Thoughts that you would be better off dead, or of hurting yourself in some way: 0 - not at all  PHQ-9 Score: 7   PHQ-9 Interpretation: Mild depression        General Health  · Sleep: gets about 6 hours of sleep per night   · Hearing: normal - bilateral   · Vision: most recent eye exam <1 year ago and wears glasses  · Dental: regular dental visits  /GYN Health  · Last menstrual period: 2022  · Contraceptive method: OCPs  · History of STDs?: No  · Dr Martha Noble prescribes testosterone injections, to be seen on 3/27/23     Review of Systems:     Review of Systems   Constitutional: Negative  HENT: Negative  Eyes: Negative  Respiratory: Negative  Cardiovascular: Negative  Gastrointestinal: Negative  Endocrine: Negative  Genitourinary: Negative  Musculoskeletal: Negative  Skin: Negative  Allergic/Immunologic: Negative  Neurological: Negative  Hematological: Negative  Psychiatric/Behavioral: Negative         Past Medical History:     Past Medical History:   Diagnosis Date   • Anxiety    • Depression    • History of psychiatric hospitalization    • Panic attack    • Psychiatric disorder • Suicidal ideations    • Suicide attempt [facility]) 2016      Past Surgical History:     No past surgical history on file  Social History:     Social History     Socioeconomic History   • Marital status: Single     Spouse name: None   • Number of children: None   • Years of education: None   • Highest education level: None   Occupational History   • None   Tobacco Use   • Smoking status: Never   • Smokeless tobacco: Never   Vaping Use   • Vaping Use: Never used   Substance and Sexual Activity   • Alcohol use: Yes   • Drug use: No   • Sexual activity: None   Other Topics Concern   • None   Social History Narrative   • None     Social Determinants of Health     Financial Resource Strain: Medium Risk   • Difficulty of Paying Living Expenses: Somewhat hard   Food Insecurity: Not on file   Transportation Needs: Unmet Transportation Needs   • Lack of Transportation (Medical): No   • Lack of Transportation (Non-Medical): Yes   Physical Activity: Not on file   Stress: Not on file   Social Connections: Not on file   Intimate Partner Violence: Not on file   Housing Stability: Not on file      Family History:     No family history on file     Current Medications:     Current Outpatient Medications   Medication Sig Dispense Refill   • B-D 3CC LUER-RAIN SYR 36PM5-6/2 18G X 1-1/2" 3 ML MISC USE TO DRAW UP THE TESTOSTERONE CYPIONATE EVERY 14 DAYS     • BD PrecisionGlide Needle 23G X 1-1/2" MISC USE TO ADMINISTER THE TESTOSTERONE CYPIONATE EVERY 14 DAYS     • cetirizine (ZyrTEC) 10 mg tablet Take 1 tablet (10 mg total) by mouth daily at bedtime 30 tablet 1   • ferrous sulfate 325 (65 Fe) mg tablet Take 1 tablet (325 mg total) by mouth daily with breakfast 30 tablet 0   • fluticasone (FLONASE) 50 mcg/act nasal spray 1 spray into each nostril daily 11 mL 3   • Levonorgest-Eth Estrad 91-Day 0 15-0 03 &0 01 MG TABS Take 1 tablet by mouth daily 91 tablet    • sertraline (ZOLOFT) 100 mg tablet 150 mg daily     • testosterone cypionate (DEPO-TESTOSTERONE) 200 mg/mL SOLN INJECT 1 CC INTRAMUSCULARLY EVERY 14 DAYS     • Testosterone Cypionate 200 MG/ML KIT      • ARIPiprazole (ABILIFY) 5 mg tablet Take 1 tablet (5 mg total) by mouth daily for 15 days At 9am 15 tablet 0   • prazosin (MINIPRESS) 1 mg capsule TAKE 1 CAPSULE BY MOUTH AT NIGHT FOR 30 DAYS       No current facility-administered medications for this visit  Allergies: Allergies   Allergen Reactions   • Flavoring Agent - Food Allergy Diarrhea and GI Intolerance     Hot dogs   • Lactose - Food Allergy Abdominal Pain, GI Intolerance, Nausea Only and Vomiting   • Orange Oil - Food Allergy Abdominal Pain, Diarrhea and Vomiting     Hot dogs      Physical Exam:     /78   Pulse 90   Temp 97 5 °F (36 4 °C)   Ht 5' 5" (1 651 m)   Wt 109 kg (240 lb)   SpO2 99%   BMI 39 94 kg/m²     Physical Exam  Vitals and nursing note reviewed  Constitutional:       General: He is not in acute distress  Appearance: Normal appearance  He is well-developed  He is obese  He is not ill-appearing, toxic-appearing or diaphoretic  HENT:      Head: Normocephalic and atraumatic  Right Ear: Tympanic membrane and external ear normal  There is impacted cerumen  Left Ear: Tympanic membrane, ear canal and external ear normal       Nose: Nose normal       Mouth/Throat:      Mouth: Mucous membranes are moist       Pharynx: Oropharynx is clear  No oropharyngeal exudate  Eyes:      Conjunctiva/sclera: Conjunctivae normal       Pupils: Pupils are equal, round, and reactive to light  Neck:      Thyroid: No thyromegaly  Cardiovascular:      Rate and Rhythm: Normal rate and regular rhythm  Heart sounds: Normal heart sounds  No murmur heard  Pulmonary:      Effort: Pulmonary effort is normal  No respiratory distress  Breath sounds: Normal breath sounds  No stridor  No wheezing or rales  Chest:      Chest wall: No tenderness     Abdominal:      General: Bowel sounds are normal  There is no distension  Palpations: Abdomen is soft  There is no mass  Tenderness: There is no abdominal tenderness  There is no guarding or rebound  Hernia: No hernia is present  Musculoskeletal:         General: Normal range of motion  Cervical back: Normal range of motion and neck supple  Lymphadenopathy:      Cervical: No cervical adenopathy  Skin:     Capillary Refill: Capillary refill takes less than 2 seconds  Neurological:      General: No focal deficit present  Mental Status: He is alert and oriented to person, place, and time  Cranial Nerves: No cranial nerve deficit  Psychiatric:         Mood and Affect: Mood normal          Behavior: Behavior normal          Thought Content:  Thought content normal          Judgment: Judgment normal           Slava Bell, 1960 [facility] [address] [facility]

## 2023-03-16 NOTE — PROGRESS NOTES
Answers for HPI/ROS submitted by the patient on 3/16/2023  How often during the last year have you found that you were not able to stop drinking once you had started?: 0 - never  How often during the last year have you failed to do what was normally expected from you because of drinking?: 0 - never  How often during the last year have you needed a first drink in the morning to get yourself going after a heavy drinking session?: 0 - never  How often during the last year have you had a feeling of guilt or remorse after drinking?: 1 - less than monthly  How often during the last year have you been unable to remember what happened the night before because you had been drinking?: 0 - never  Have you or someone else been injured as a result of your drinking?: 0 - no  Has a relative or friend or a doctor or another health worker been concerned about your drinking or suggested you cut down?: 0 - no  How would you rate your overall health?: fair  Compared to last year, how is your physical health?: slightly better  In general, how satisfied are you with your life?: dissatisfied  Compared to last year, how is your eyesight?: same  Compared to last year, how is your hearing?: slightly worse  Compared to last year, how is your emotional/mental health?: much better  How often is anger a problem for you?: never, rarely  How often do you feel unusually tired/fatigued?: sometimes  In the past 7 days, how much pain have you experienced?: some  If you answered "some" or "a lot", please rate the severity of your pain on a scale of 1 to 10 (1 being the least severe pain and 10 being the most intense pain)  : 4/10  In the past 6 months, have you lost or gained 10 pounds without trying?: No  One or more falls in the last year: No  In the past 6 months, have you accidentally leaked urine?: No  Do you have trouble with the stairs inside or outside your home?: No  Does your home have working smoke alarms?: Yes  Does your home have a carbon monoxide monitor?: No  Which safety hazards (if any) have you experienced in your home? Please select all that apply : loose rugs on the floor, household clutter, not having non-slip bath and/or shower mats  How would you describe your current diet?  Please select all that apply : Regular, Other (please comment)  Additional Comments: Kosher  In addition to prescription medications, are you taking any over-the-counter supplements?: Yes  If yes, what supplements are you taking?: Iron  Can you manage your medications?: Yes  Are you currently taking any opioid medications?: No  Can you walk and transfer into and out of your bed and chair?: Yes  Can you dress and groom yourself?: Yes  Can you bathe or shower yourself?: Yes  Can you feed yourself?: Yes  Can you do your laundry/ housekeeping?: Yes  Can you manage your money, pay your bills, and track your expenses?: Yes  Can you make your own meals?: Yes  Can you do your own shopping?: Yes  Additional Comments: Job hours are inconsistent  Within the last 12 months, have you had any hospitalizations or Emergency Department visits?: Yes  If yes, how many times have you been hospitalized within the past year?: 1-2  Additional Comments: Viral syndrome  Do you have a living will?: No  Do you have a Durable POA (Power of ) for healthcare decisions?: No  Do you have an Advanced Directive for end of life decisions?: No  How often have you used an illegal drug (including marijuana) or a prescription medication for non-medical reasons in the past year?: less than monthly  Have you used drugs other than those required for medical reasons?: Yes  Do you abuse more than one drug at a time?: No  Are you always able to stop using drugs when you want to?: Yes  Have you had "blackouts" or "flashbacks" as a result of drug use?: No  Do you ever feel bad or guilty about your drug use?: No  Does your spouse (or parents) ever complain about your involvement with drugs?: No  Have you neglected your family because of your use of drugs?: No  Have you engaged in illegal activities in order to obtain drugs?: No  Have you ever experienced withdrawal symptoms (felt sick) when you stopped taking drugs?: No  Have you had medical problems as a result of your drug use (e g , memory loss, hepatitis, convulsions, bleeding, etc )?: No  What is the typical number of drinks you consume in a day?: 0  What is the typical number of drinks you consume in a week?: 2  How often did you have a drink containing alcohol in the past year?: 2 to 4 times a month, 2 to 4 times a month  How often did you have 6 or more drinks on one occasion in the past year?: less than monthly, less than monthly    Conflicting answers have been found for some questions  Please document the patient's answers manually  ***     Assessment and Plan:     Problem List Items Addressed This Visit    None       Preventive health issues were discussed with patient, and age appropriate screening tests were ordered as noted in patient's After Visit Summary  Personalized health advice and appropriate referrals for health education or preventive services given if needed, as noted in patient's After Visit Summary       History of Present Illness:     Patient presents for a Medicare Wellness Visit    HPI   Patient Care Team:  CELIA Vaughn as PCP - General (Family Medicine)  Charbel Bal as PCP - PCP-[employer] ([facility])     Review of Systems:     Review of Systems     Problem List:     Patient Active Problem List   Diagnosis   • Depression   • Suicidal ideation   • Medical clearance for psychiatric admission   • Anemia   • Recurrent major depressive disorder ([facility] [address] )   • Generalized anxiety disorder with panic attacks   • PTSD (post-traumatic stress disorder)   • Iron deficiency anemia      Past Medical and Surgical History:     Past Medical History:   Diagnosis Date   • Anxiety    • Depression    • History of psychiatric hospitalization    • Panic attack    • Psychiatric disorder    • Suicidal ideations    • Suicide attempt ([facility] [address] ) 2016     No past surgical history on file  Family History:     No family history on file  Social History:     Social History     Socioeconomic History   • Marital status: Single     Spouse name: Not on file   • Number of children: Not on file   • Years of education: Not on file   • Highest education level: Not on file   Occupational History   • Not on file   Tobacco Use   • Smoking status: Never   • Smokeless tobacco: Never   Vaping Use   • Vaping Use: Never used   Substance and Sexual Activity   • Alcohol use: Yes   • Drug use: No   • Sexual activity: Not on file   Other Topics Concern   • Not on file   Social History Narrative   • Not on file     Social Determinants of Health     Financial Resource Strain: Medium Risk   • Difficulty of Paying Living Expenses: Somewhat hard   Food Insecurity: Not on file   Transportation Needs: Unmet Transportation Needs   • Lack of Transportation (Medical):  No   • Lack of Transportation (Non-Medical): Yes   Physical Activity: Not on file   Stress: Not on file   Social Connections: Not on file   Intimate Partner Violence: Not on file   Housing Stability: Not on file      Medications and Allergies:     Current Outpatient Medications   Medication Sig Dispense Refill   • B-D 3CC LUER-RAIN SYR 85TT5-4/2 18G X 1-1/2" 3 ML MISC USE TO DRAW UP THE TESTOSTERONE CYPIONATE EVERY 14 DAYS     • BD PrecisionGlide Needle 23G X 1-1/2" MISC USE TO ADMINISTER THE TESTOSTERONE CYPIONATE EVERY 14 DAYS     • cetirizine (ZyrTEC) 10 mg tablet Take 1 tablet (10 mg total) by mouth daily at bedtime 30 tablet 1   • ferrous sulfate 325 (65 Fe) mg tablet Take 1 tablet (325 mg total) by mouth daily with breakfast 30 tablet 0   • fluticasone (FLONASE) 50 mcg/act nasal spray 1 spray into each nostril daily 11 mL 3   • Levonorgest-Eth Estrad 91-Day 0 15-0 03 &0 01 MG TABS Take 1 tablet by mouth daily     • meclizine (ANTIVERT) 25 mg tablet Take 1 tablet (25 mg total) by mouth 3 (three) times a day as needed for dizziness 30 tablet 0   • prazosin (MINIPRESS) 2 mg capsule Take 1 capsule (2 mg total) by mouth daily at bedtime (Patient taking differently: Take 1 mg by mouth daily at bedtime) 15 capsule 0   • testosterone cypionate (DEPO-TESTOSTERONE) 200 mg/mL SOLN INJECT 1 CC INTRAMUSCULARLY EVERY 14 DAYS     • Testosterone Cypionate 200 MG/ML KIT      • ARIPiprazole (ABILIFY) 5 mg tablet Take 1 tablet (5 mg total) by mouth daily for 15 days At 9am 15 tablet 0   • Ashlyna 0 15-0 03 &0 01 MG TABS Take 1 tablet by mouth daily     • ondansetron (ZOFRAN-ODT) 4 mg disintegrating tablet Take 1 tablet (4 mg total) by mouth every 6 (six) hours as needed for nausea or vomiting 20 tablet 0   • sertraline (ZOLOFT) 100 mg tablet Take 1 tablet (100 mg total) by mouth daily for 15 days At 9am (Patient taking differently: Take 100 mg by mouth daily 150mg daily) 15 tablet 0     No current facility-administered medications for this visit  Allergies   Allergen Reactions   • Flavoring Agent - Food Allergy Diarrhea and GI Intolerance     Hot dogs   • Lactose - Food Allergy Abdominal Pain, GI Intolerance, Nausea Only and Vomiting   • Orange Oil - Food Allergy Abdominal Pain, Diarrhea and Vomiting     Hot dogs      Immunizations: There is no immunization history on file for this patient  Health Maintenance:         Topic Date Due   • Hepatitis C Screening  Never done   • HIV Screening  Never done   • Cervical Cancer Screening  Never done         Topic Date Due   • HPV Vaccine (1 - 2-dose series) Never done   • COVID-19 Vaccine (3 - Booster for Moderna series) 07/23/2021   • Influenza Vaccine (1) 09/01/2022      Medicare Screening Tests and Risk Assessments:         Health Risk Assessment:   Patient rates overall health as fair  Patient feels that their physical health rating is slightly better  Patient is dissatisfied with their life  Eyesight was rated as same  Hearing was rated as slightly worse  Patient feels that their emotional and mental health rating is much better  Patients states they are never, rarely angry  Patient states they are sometimes unusually tired/fatigued  Pain experienced in the last 7 days has been some  Patient's pain rating has been 4/10  Patient states that he has experienced no weight loss or gain in last 6 months  Fall Risk Screening: In the past year, patient has experienced: no history of falling in past year      Urinary Incontinence Screening:   Patient has not leaked urine accidently in the last six months  Home Safety:  Patient does not have trouble with stairs inside or outside of their home  Patient has working smoke alarms and has no working carbon monoxide detector  Home safety hazards include: loose rugs on the floor, household clutter and not having non-slip bath and/or shower mats  Nutrition:   Current diet is Regular and Other (please comment)  Kosher    Medications:   Patient is currently taking over-the-counter supplements  OTC medications include: Iron  Patient is able to manage medications  Activities of Daily Living (ADLs)/Instrumental Activities of Daily Living (IADLs):   Walk and transfer into and out of bed and chair?: Yes  Dress and groom yourself?: Yes    Bathe or shower yourself?: Yes    Feed yourself?  Yes  Do your laundry/housekeeping?: Yes  Manage your money, pay your bills and track your expenses?: Yes  Make your own meals?: Yes    Do your own shopping?: Yes    ADL comments: Job hours are inconsistent    Previous Hospitalizations:   Any hospitalizations or ED visits within the last 12 months?: Yes    How many hospitalizations have you had in the last year?: 1-2    Hospitalization Comments: Viral syndrome    Advance Care Planning:   Living will: No    Durable POA for healthcare: No    Advanced directive: No      Cognitive Screening:   Provider or family/friend/caregiver concerned regarding cognition?: No    PREVENTIVE SCREENINGS      Cardiovascular Screening:    General: Screening Current      Diabetes Screening:     General: Screening Current      Colorectal Cancer Screening:     General: Screening Not Indicated      Breast Cancer Screening:     General: Screening Not Indicated      Lung Cancer Screening:     General: Screening Not Indicated    Screening, Brief Intervention, and Referral to Treatment (SBIRT)    Screening  Typical number of drinks in a day: 0  Typical number of drinks in a week: 2  Interpretation: Low risk drinking behavior  AUDIT-C Screenin) How often did you have a drink containing alcohol in the past year? 2 to 4 times a month  3) How often did you have 6 or more drinks on one occasion in the past year? less than monthly    Single Item Drug Screening:  How often have you used an illegal drug (including marijuana) or a prescription medication for non-medical reasons in the past year? less than monthly    Single Item Drug Screen Score: 1  Interpretation: POSITIVE screen for possible drug use disorder    Drug Abuse Screening Test (DAST-10):  1) Have you used drugs other than those required for medical reasons? Yes  2) Do you abuse more than one drug at a time? No  3) Are you always able to stop using drugs when you want to? Yes  4) Have you had "blackouts" or "flashbacks" as a result of drug use? No  5) Do you ever feel bad or guilty about your drug use? No  6) Does your spouse (or parents) ever complain about your involvement with drugs? No  7) Have you neglected your family because of your use of drugs? No  8) Have you engaged in illegal activities in order to obtain drugs? No  9) Have you ever experienced withdrawal symptoms (felt sick) when you stopped taking drugs? No  10) Have you had medical problems as a result of your drug use (e g , memory loss, hepatitis, convulsions, bleeding, etc )?  No    DAST-10 Score: 1  Interpretation: Low level problems related to drug abuse    Other Counseling Topics:   Car/seat belt/driving safety, skin self-exam, sunscreen and calcium and vitamin D intake and regular weightbearing exercise  No results found       Physical Exam:     Pulse 90   Temp 97 5 °F (36 4 °C)   Ht 5' 5" (1 651 m)   Wt 109 kg (240 lb)   SpO2 99%   BMI 39 94 kg/m²     Physical Exam     CELIA Quinn

## 2023-05-03 ENCOUNTER — OFFICE VISIT (OUTPATIENT)
Dept: FAMILY MEDICINE CLINIC | Facility: CLINIC | Age: 23
End: 2023-05-03

## 2023-05-03 VITALS
DIASTOLIC BLOOD PRESSURE: 62 MMHG | SYSTOLIC BLOOD PRESSURE: 118 MMHG | WEIGHT: 248 LBS | OXYGEN SATURATION: 97 % | BODY MASS INDEX: 41.32 KG/M2 | HEART RATE: 83 BPM | HEIGHT: 65 IN

## 2023-05-03 DIAGNOSIS — F41.0 GENERALIZED ANXIETY DISORDER WITH PANIC ATTACKS: ICD-10-CM

## 2023-05-03 DIAGNOSIS — F33.42 RECURRENT MAJOR DEPRESSIVE DISORDER, IN FULL REMISSION (HCC): Primary | ICD-10-CM

## 2023-05-03 DIAGNOSIS — F41.1 GENERALIZED ANXIETY DISORDER WITH PANIC ATTACKS: ICD-10-CM

## 2023-05-03 NOTE — ASSESSMENT & PLAN NOTE
Stable, doing quite well on current medications, encourage continued follow-up with psychiatry/therapist

## 2023-05-03 NOTE — PROGRESS NOTES
Assessment/Plan:     Chronic Problems:  Recurrent major depressive disorder (HCC)  Stable, doing quite well on current medications, encourage continued follow-up with psychiatry/therapist    Generalized anxiety disorder with panic attacks  Stable, with no concerns continued follow-up with therapy      Visit Diagnosis:  Diagnoses and all orders for this visit:    Recurrent major depressive disorder, in full remission ([facility] [address] )    Generalized anxiety disorder with panic attacks          Subjective:    Patient ID: Anitha Rachel is a 20 y o  adult  F/u   Generally ding well   No further issue to address   Blue mtn / redco   In process of transition , female to male testosterone therapy   Future plans writer         The following portions of the patient's history were reviewed and updated as appropriate: allergies, current medications, past family history, past medical history, past social history, past surgical history and problem list     Review of Systems   Constitutional: Negative for appetite change, chills, fever and unexpected weight change  HENT: Negative for congestion, dental problem, ear pain, hearing loss, postnasal drip, rhinorrhea, sinus pressure, sinus pain, sneezing, sore throat, tinnitus and voice change  Eyes: Negative for visual disturbance  Respiratory: Negative for apnea, cough, chest tightness and shortness of breath  Cardiovascular: Negative for chest pain, palpitations and leg swelling  Gastrointestinal: Negative for abdominal pain, blood in stool, constipation, diarrhea, nausea and vomiting  Endocrine: Negative for cold intolerance, heat intolerance, polydipsia, polyphagia and polyuria  Genitourinary: Negative for decreased urine volume, difficulty urinating, dysuria, frequency and hematuria  Musculoskeletal: Negative for arthralgias, back pain, gait problem, joint swelling and myalgias  Skin: Negative for color change, rash and wound     Allergic/Immunologic: Negative for "environmental allergies and food allergies  Neurological: Negative for dizziness, syncope, weakness, light-headedness, numbness and headaches  Hematological: Negative for adenopathy  Does not bruise/bleed easily  Psychiatric/Behavioral: Negative for sleep disturbance and suicidal ideas  The patient is not nervous/anxious  /62   Pulse 83   Ht 5' 5\" (1 651 m)   Wt 112 kg (248 lb)   SpO2 97%   BMI 41 27 kg/m²   Social History     Socioeconomic History    Marital status: Single     Spouse name: Not on file    Number of children: Not on file    Years of education: Not on file    Highest education level: Not on file   Occupational History    Not on file   Tobacco Use    Smoking status: Never    Smokeless tobacco: Never   Vaping Use    Vaping Use: Never used   Substance and Sexual Activity    Alcohol use: Yes    Drug use: No    Sexual activity: Not on file   Other Topics Concern    Not on file   Social History Narrative    Not on file     Social Determinants of Health     Financial Resource Strain: Medium Risk    Difficulty of Paying Living Expenses: Somewhat hard   Food Insecurity: Not on file   Transportation Needs: Unmet Transportation Needs    Lack of Transportation (Medical): No    Lack of Transportation (Non-Medical): Yes   Physical Activity: Not on file   Stress: Not on file   Social Connections: Not on file   Intimate Partner Violence: Not on file   Housing Stability: Not on file     Past Medical History:   Diagnosis Date    Anxiety     Depression     History of psychiatric hospitalization     Panic attack     Psychiatric disorder     Suicidal ideations     [facility]     History reviewed  No pertinent family history  History reviewed  No pertinent surgical history      Current Outpatient Medications:     ARIPiprazole (ABILIFY) 5 mg tablet, Take 1 tablet (5 mg total) by mouth daily for 15 days At 9am, Disp: 15 tablet, Rfl: 0    cetirizine (ZyrTEC) " "10 mg tablet, Take 1 tablet (10 mg total) by mouth daily at bedtime, Disp: 30 tablet, Rfl: 1    ferrous sulfate 325 (65 Fe) mg tablet, Take 1 tablet (325 mg total) by mouth daily with breakfast, Disp: 30 tablet, Rfl: 0    fluticasone (FLONASE) 50 mcg/act nasal spray, 1 spray into each nostril daily, Disp: 11 mL, Rfl: 3    Levonorgest-Eth Estrad 91-Day 0 15-0 03 &0 01 MG TABS, Take 1 tablet by mouth daily, Disp: 91 tablet, Rfl:     prazosin (MINIPRESS) 1 mg capsule, TAKE 1 CAPSULE BY MOUTH AT NIGHT FOR 30 DAYS, Disp: , Rfl:     sertraline (ZOLOFT) 100 mg tablet, 150 mg daily, Disp: , Rfl:     testosterone cypionate (DEPO-TESTOSTERONE) 200 mg/mL SOLN, INJECT 1 CC INTRAMUSCULARLY EVERY 14 DAYS, Disp: , Rfl:     B-D 3CC LUER-RAIN SYR 23RI7-0/2 18G X 1-1/2\" 3 ML MISC, USE TO DRAW UP THE TESTOSTERONE CYPIONATE EVERY 14 DAYS, Disp: , Rfl:     BD PrecisionGlide Needle 23G X 1-1/2\" MISC, USE TO ADMINISTER THE TESTOSTERONE CYPIONATE EVERY 14 DAYS, Disp: , Rfl:     Allergies   Allergen Reactions    Flavoring Agent - Food Allergy Diarrhea and GI Intolerance     Hot dogs    Lactose - Food Allergy Abdominal Pain, GI Intolerance, Nausea Only and Vomiting    Orange Oil - Food Allergy Abdominal Pain, Diarrhea and Vomiting     Hot dogs          Lab Review   No visits with results within 2 Month(s) from this visit     Latest known visit with results is:   Appointment on 02/24/2023   Component Date Value    WBC 02/24/2023 6 34     RBC 02/24/2023 4 83     Hemoglobin 02/24/2023 12 5     Hematocrit 02/24/2023 42 5     MCV 02/24/2023 88     MCH 02/24/2023 25 9 (L)     MCHC 02/24/2023 29 4 (L)     RDW 02/24/2023 14 0     MPV 02/24/2023 12 0     Platelets 51/71/7896 264     nRBC 02/24/2023 0     Neutrophils Relative 02/24/2023 57     Immat GRANS % 02/24/2023 0     Lymphocytes Relative 02/24/2023 31     Monocytes Relative 02/24/2023 9     Eosinophils Relative 02/24/2023 2     Basophils Relative 02/24/2023 1     " "Neutrophils Absolute 02/24/2023 3 59     Immature Grans Absolute 02/24/2023 0 02     Lymphocytes Absolute 02/24/2023 1 97     Monocytes Absolute 02/24/2023 0 59     Eosinophils Absolute 02/24/2023 0 14     Basophils Absolute 02/24/2023 0 03     Sodium 02/24/2023 139     Potassium 02/24/2023 4 0     Chloride 02/24/2023 108     CO2 02/24/2023 25     ANION GAP 02/24/2023 6     BUN 02/24/2023 8     Creatinine 02/24/2023 0 73     Glucose, Fasting 02/24/2023 96     Calcium 02/24/2023 9 2     AST 02/24/2023 11     ALT 02/24/2023 15     Alkaline Phosphatase 02/24/2023 62     Total Protein 02/24/2023 7 4     Albumin 02/24/2023 3 6     Total Bilirubin 02/24/2023 0 26     Testosterone, Free 02/24/2023 23 8 (H)     TESTOSTERONE TOTAL 02/24/2023 413 (H)     Estradiol 02/24/2023 37 0         Imaging: No results found  Objective:     Physical Exam  Constitutional:       General: He is not in acute distress  Appearance: He is well-developed  He is not ill-appearing  HENT:      Head: Normocephalic and atraumatic  Cardiovascular:      Rate and Rhythm: Normal rate and regular rhythm  Heart sounds: Normal heart sounds  Pulmonary:      Effort: Pulmonary effort is normal       Breath sounds: Normal breath sounds  Musculoskeletal:         General: Normal range of motion  Cervical back: Normal range of motion and neck supple  Skin:     General: Skin is warm and dry  Neurological:      Mental Status: He is alert and oriented to person, place, and time  Deep Tendon Reflexes: Reflexes are normal and symmetric  Psychiatric:         Mood and Affect: Mood normal          Behavior: Behavior normal          Thought Content: Thought content normal          Judgment: Judgment normal            There are no Patient Instructions on file for this visit  CELIA Jimenez    Portions of the record may have been created with voice recognition software    Occasional wrong word or \"sound a " "like\" substitutions may have occurred due to the inherent limitations of voice recognition software  Read the chart carefully and recognize, using context, where substitutions have occurred    "

## 2023-06-19 ENCOUNTER — APPOINTMENT (OUTPATIENT)
Age: 23
End: 2023-06-19
Payer: COMMERCIAL

## 2023-06-19 DIAGNOSIS — F64.0 TRANSSEXUALISM: ICD-10-CM

## 2023-06-19 LAB
ALBUMIN SERPL BCP-MCNC: 3.7 G/DL (ref 3.5–5)
ALP SERPL-CCNC: 65 U/L (ref 46–116)
ALT SERPL W P-5'-P-CCNC: 22 U/L (ref 12–78)
ANION GAP SERPL CALCULATED.3IONS-SCNC: 3 MMOL/L (ref 4–13)
AST SERPL W P-5'-P-CCNC: 20 U/L (ref 5–45)
BILIRUB SERPL-MCNC: 0.32 MG/DL (ref 0.2–1)
BUN SERPL-MCNC: 10 MG/DL (ref 5–25)
CALCIUM SERPL-MCNC: 9.1 MG/DL (ref 8.3–10.1)
CHLORIDE SERPL-SCNC: 108 MMOL/L (ref 96–108)
CO2 SERPL-SCNC: 29 MMOL/L (ref 21–32)
CREAT SERPL-MCNC: 0.87 MG/DL (ref 0.6–1.3)
GLUCOSE P FAST SERPL-MCNC: 76 MG/DL (ref 65–99)
POTASSIUM SERPL-SCNC: 3.9 MMOL/L (ref 3.5–5.3)
PROT SERPL-MCNC: 7.7 G/DL (ref 6.4–8.4)
SODIUM SERPL-SCNC: 140 MMOL/L (ref 135–147)

## 2023-06-19 PROCEDURE — 82679 ASSAY OF ESTRONE: CPT

## 2023-06-19 PROCEDURE — 82670 ASSAY OF TOTAL ESTRADIOL: CPT

## 2023-06-19 PROCEDURE — 80053 COMPREHEN METABOLIC PANEL: CPT

## 2023-06-19 PROCEDURE — 36415 COLL VENOUS BLD VENIPUNCTURE: CPT

## 2023-06-20 LAB — ESTRADIOL SERPL-MCNC: 71.7 PG/ML

## 2023-06-22 LAB — ESTRONE SERPL-MCNC: 77 PG/ML (ref 27–231)

## 2023-09-20 ENCOUNTER — APPOINTMENT (OUTPATIENT)
Dept: LAB | Facility: HOSPITAL | Age: 23
End: 2023-09-20
Payer: COMMERCIAL

## 2023-09-21 LAB
TESTOST FREE SERPL-MCNC: 33.9 PG/ML (ref 0–4.2)
TESTOST SERPL-MCNC: 944 NG/DL (ref 13–71)

## 2024-03-22 ENCOUNTER — APPOINTMENT (OUTPATIENT)
Dept: LAB | Facility: HOSPITAL | Age: 24
End: 2024-03-22
Payer: COMMERCIAL

## 2024-03-22 DIAGNOSIS — F64.0 TRANSSEXUALISM: ICD-10-CM

## 2024-03-22 LAB
ALBUMIN SERPL BCP-MCNC: 4.4 G/DL (ref 3.5–5)
ALP SERPL-CCNC: 52 U/L (ref 34–104)
ALT SERPL W P-5'-P-CCNC: 13 U/L (ref 7–52)
ANION GAP SERPL CALCULATED.3IONS-SCNC: 3 MMOL/L (ref 4–13)
AST SERPL W P-5'-P-CCNC: 16 U/L (ref 5–45)
BILIRUB SERPL-MCNC: 0.46 MG/DL (ref 0.2–1)
BUN SERPL-MCNC: 13 MG/DL (ref 5–25)
CALCIUM SERPL-MCNC: 9.7 MG/DL (ref 8.4–10.2)
CHLORIDE SERPL-SCNC: 103 MMOL/L (ref 96–108)
CO2 SERPL-SCNC: 33 MMOL/L (ref 21–32)
CREAT SERPL-MCNC: 0.85 MG/DL (ref 0.6–1.3)
ERYTHROCYTE [DISTWIDTH] IN BLOOD BY AUTOMATED COUNT: 12.8 % (ref 11.6–15.1)
ESTRADIOL SERPL-MCNC: 39.5 PG/ML
GLUCOSE P FAST SERPL-MCNC: 88 MG/DL (ref 65–99)
HCT VFR BLD AUTO: 46.7 % (ref 36.5–46.1)
HGB BLD-MCNC: 14.7 G/DL (ref 12–15.4)
MCH RBC QN AUTO: 26.8 PG (ref 26.8–34.3)
MCHC RBC AUTO-ENTMCNC: 31.5 G/DL (ref 31.4–37.4)
MCV RBC AUTO: 85 FL (ref 82–98)
PLATELET # BLD AUTO: 221 THOUSANDS/UL (ref 149–390)
PMV BLD AUTO: 11.9 FL (ref 8.9–12.7)
POTASSIUM SERPL-SCNC: 4.5 MMOL/L (ref 3.5–5.3)
PROT SERPL-MCNC: 7.8 G/DL (ref 6.4–8.4)
RBC # BLD AUTO: 5.48 MILLION/UL (ref 3.88–5.12)
SODIUM SERPL-SCNC: 139 MMOL/L (ref 135–147)
WBC # BLD AUTO: 6.82 THOUSAND/UL (ref 4.31–10.16)

## 2024-03-22 PROCEDURE — 36415 COLL VENOUS BLD VENIPUNCTURE: CPT

## 2024-03-22 PROCEDURE — 84403 ASSAY OF TOTAL TESTOSTERONE: CPT

## 2024-03-22 PROCEDURE — 84402 ASSAY OF FREE TESTOSTERONE: CPT

## 2024-03-22 PROCEDURE — 85027 COMPLETE CBC AUTOMATED: CPT

## 2024-03-22 PROCEDURE — 82670 ASSAY OF TOTAL ESTRADIOL: CPT

## 2024-03-22 PROCEDURE — 80053 COMPREHEN METABOLIC PANEL: CPT

## 2024-03-24 LAB
TESTOST FREE SERPL-MCNC: 4.9 PG/ML (ref 0–4.2)
TESTOST SERPL-MCNC: 102 NG/DL (ref 13–71)

## 2024-09-07 ENCOUNTER — HOSPITAL ENCOUNTER (EMERGENCY)
Facility: HOSPITAL | Age: 24
Discharge: HOME | End: 2024-09-07
Attending: EMERGENCY MEDICINE
Payer: COMMERCIAL

## 2024-09-07 VITALS
HEART RATE: 78 BPM | OXYGEN SATURATION: 98 % | DIASTOLIC BLOOD PRESSURE: 86 MMHG | TEMPERATURE: 98.5 F | SYSTOLIC BLOOD PRESSURE: 132 MMHG | BODY MASS INDEX: 40.98 KG/M2 | WEIGHT: 246 LBS | HEIGHT: 65 IN | RESPIRATION RATE: 20 BRPM

## 2024-09-07 DIAGNOSIS — F41.9 ANXIETY: Primary | ICD-10-CM

## 2024-09-07 DIAGNOSIS — F41.0 PANIC ATTACK: ICD-10-CM

## 2024-09-07 PROCEDURE — 93005 ELECTROCARDIOGRAM TRACING: CPT

## 2024-09-07 PROCEDURE — 96372 THER/PROPH/DIAG INJ SC/IM: CPT

## 2024-09-07 PROCEDURE — 99284 EMERGENCY DEPT VISIT MOD MDM: CPT | Performed by: EMERGENCY MEDICINE

## 2024-09-07 PROCEDURE — 99285 EMERGENCY DEPT VISIT HI MDM: CPT

## 2024-09-07 RX ORDER — LORAZEPAM 2 MG/ML
1 INJECTION INTRAMUSCULAR ONCE
Status: COMPLETED | OUTPATIENT
Start: 2024-09-07 | End: 2024-09-07

## 2024-09-07 RX ADMIN — LORAZEPAM 1 MG: 2 INJECTION INTRAMUSCULAR; INTRAVENOUS at 17:42

## 2024-09-07 NOTE — Clinical Note
Ronda Morillo was seen and treated in our emergency department on 9/7/2024.                Diagnosis:     Ronda  may return to work on return date.    He may return on this date: 09/08/2024         If you have any questions or concerns, please don't hesitate to call.      Rajesh Steve RN    ______________________________           _______________          _______________  Hospital Representative                              Date                                Time

## 2024-09-07 NOTE — ED PROVIDER NOTES
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 These feelings started before even going to work, states he was sitting in a quiet room.      23 yo with palpitations and anxiety/panic attack. Requesting crisis eval. No SI or HI. No physical concerns.         Prior to Admission Medications   Prescriptions Last Dose Informant Patient Reported? Taking?   ARIPiprazole (ABILIFY) 5 mg tablet   No No   Sig: Take 1 tablet (5 mg total) by mouth daily for 15 days At 9am   B-D 3CC LUER-RAIN SYR 40YV4-0/2 18G X 1-1/2\" 3 ML MISC   Yes No   Sig: USE TO DRAW UP THE TESTOSTERONE CYPIONATE EVERY 14 DAYS   BD PrecisionGlide Needle 23G X 1-/2\" MISC   Yes No   Sig: USE TO ADMINISTER THE TESTOSTERONE CYPIONATE EVERY 14 DAYS   Levonorgest-Eth Estrad -Day 0.15-0.03 &0.01 MG TABS   No No   Sig: Take 1 tablet by mouth daily   cetirizine (ZyrTEC) 10 mg tablet   No No   Sig: Take 1 tablet (10 mg total) by mouth daily at bedtime   ferrous sulfate 325 (65 Fe) mg tablet  Self No No   Sig: Take 1 tablet (325 mg total) by mouth daily with breakfast   fluticasone (FLONASE) 50 mcg/act nasal spray   No No   Si spray into each nostril daily   prazosin (MINIPRESS) 1 mg capsule   Yes No   Sig: TAKE 1 CAPSULE BY MOUTH AT NIGHT FOR 30 DAYS   sertraline (ZOLOFT) 100 mg tablet   No No   Si mg daily   testosterone cypionate (DEPO-TESTOSTERONE) 200 mg/mL SOLN   Yes No   Sig: INJECT 1 CC INTRAMUSCULARLY EVERY 14 DAYS      Facility-Administered Medications: None       Past Medical History:   Diagnosis Date    Anxiety     Depression     History of psychiatric hospitalization     Panic attack     Psychiatric disorder     Suicidal ideations     Suicide attempt (HCC) 2016       History reviewed. No pertinent surgical history.    History reviewed. No pertinent family history.  I have reviewed and agree with the history as " documented.    E-Cigarette/Vaping    E-Cigarette Use Never User      E-Cigarette/Vaping Substances    Nicotine No     THC No     CBD No     Flavoring No     Other No     Unknown No      Social History     Tobacco Use    Smoking status: Never    Smokeless tobacco: Never   Vaping Use    Vaping status: Never Used   Substance Use Topics    Alcohol use: Yes    Drug use: No       Review of Systems   Respiratory:  Negative for chest tightness and shortness of breath.    Neurological:  Negative for syncope and weakness.   Psychiatric/Behavioral:  Negative for agitation, self-injury and suicidal ideas.        Physical Exam  Physical Exam  Vitals and nursing note reviewed.   Constitutional:       General: He is not in acute distress.     Appearance: Normal appearance. He is well-developed. He is not ill-appearing, toxic-appearing or diaphoretic.   HENT:      Head: Normocephalic and atraumatic.      Mouth/Throat:      Mouth: Mucous membranes are moist.      Pharynx: Oropharynx is clear.   Eyes:      Conjunctiva/sclera: Conjunctivae normal.      Pupils: Pupils are equal, round, and reactive to light.   Neck:      Vascular: No JVD.   Cardiovascular:      Rate and Rhythm: Normal rate and regular rhythm.      Pulses: Normal pulses.      Heart sounds: Normal heart sounds.   Pulmonary:      Effort: Pulmonary effort is normal. No respiratory distress.      Breath sounds: Normal breath sounds. No stridor.   Abdominal:      General: There is no distension.      Palpations: Abdomen is soft.      Tenderness: There is no abdominal tenderness. There is no guarding or rebound.   Musculoskeletal:         General: No tenderness or deformity. Normal range of motion.      Cervical back: Normal range of motion and neck supple. No rigidity.   Skin:     General: Skin is warm and dry.      Capillary Refill: Capillary refill takes less than 2 seconds.      Coloration: Skin is not jaundiced or pale.      Findings: No bruising, erythema or rash.    Neurological:      General: No focal deficit present.      Mental Status: He is alert and oriented to person, place, and time.      Cranial Nerves: No cranial nerve deficit.      Sensory: No sensory deficit.      Motor: No weakness or abnormal muscle tone.      Coordination: Coordination normal.      Gait: Gait normal.   Psychiatric:         Mood and Affect: Mood normal.         Behavior: Behavior normal.         Thought Content: Thought content normal.         Judgment: Judgment normal.         Vital Signs  ED Triage Vitals [09/07/24 1704]   Temperature Pulse Respirations Blood Pressure SpO2   98.5 °F (36.9 °C) 80 18 130/79 98 %      Temp Source Heart Rate Source Patient Position - Orthostatic VS BP Location FiO2 (%)   Oral Monitor Sitting Right arm --      Pain Score       2           Vitals:    09/07/24 1704 09/07/24 1930   BP: 130/79 132/86   Pulse: 80 78   Patient Position - Orthostatic VS: Sitting Sitting         Visual Acuity      ED Medications  Medications   LORazepam (ATIVAN) injection 1 mg (1 mg Intramuscular Given 9/7/24 1742)       Diagnostic Studies  Results Reviewed       None                   No orders to display              Procedures  Procedures         ED Course  ED Course as of 09/09/24 1429   Sat Sep 07, 2024   1925 Spoke to crisis. Does not wish to sign in. No SI. Requests d/c home.                                  SBIRT 20yo+      Flowsheet Row Most Recent Value   Initial Alcohol Screen: US AUDIT-C     1. How often do you have a drink containing alcohol? 0 Filed at: 09/07/2024 1704   2. How many drinks containing alcohol do you have on a typical day you are drinking?  0 Filed at: 09/07/2024 1704   3a. Male UNDER 65: How often do you have five or more drinks on one occasion? 0 Filed at: 09/07/2024 1704   Audit-C Score 0 Filed at: 09/07/2024 1704   BRENDA: How many times in the past year have you...    Used an illegal drug or used a prescription medication for non-medical reasons? Never Filed  "at: 09/07/2024 1704                      Medical Decision Making  Problems Addressed:  Panic attack:     Details: Resolved without intervention. No si or hi. Evaluated by crisis who agrees no indication for 302 and pt does not wish to sign self in. Ok for d/c home.     Risk  Prescription drug management.                 Disposition  Final diagnoses:   Anxiety   Panic attack     Time reflects when diagnosis was documented in both MDM as applicable and the Disposition within this note       Time User Action Codes Description Comment    9/7/2024  7:26 PM Antione Hlul Add [F41.9] Anxiety     9/7/2024  7:26 PM Hull Antione Add [F41.0] Panic attack           ED Disposition       ED Disposition   Discharge    Condition   Stable    Date/Time   Sat Sep 7, 2024 1926    Comment   Ronda Morillo discharge to home/self care.                   MD Documentation      Flowsheet Row Most Recent Value   Sending MD Dr MIK Hull          Follow-up Information       Follow up With Specialties Details Why Contact Info Additional Information    [facility] Emergency Medicine  If symptoms worsen [address] [facility] [id number] [phone] [facility], [address] [place], [place], [address]            Discharge Medication List as of 9/7/2024  7:26 PM        CONTINUE these medications which have NOT CHANGED    Details   ARIPiprazole (ABILIFY) 5 mg tablet Take 1 tablet (5 mg total) by mouth daily for 15 days At 9am, Starting Tue 7/13/2021, Until Wed 5/3/2023, Print      B-D 3CC LUER-RAIN SYR 54FO6-1/2 18G X 1-1/2\" 3 ML MISC USE TO DRAW UP THE TESTOSTERONE CYPIONATE EVERY 14 DAYS, Historical Med      BD PrecisionGlide Needle 23G X 1-1/2\" MISC USE TO ADMINISTER THE TESTOSTERONE CYPIONATE EVERY 14 DAYS, Historical Med      cetirizine (ZyrTEC) 10 mg tablet Take 1 tablet (10 mg total) by mouth daily at bedtime, Starting Thu 11/3/2022, Normal "      ferrous sulfate 325 (65 Fe) mg tablet Take 1 tablet (325 mg total) by mouth daily with breakfast, Starting Fri 1/15/2021, Print      fluticasone (FLONASE) 50 mcg/act nasal spray 1 spray into each nostril daily, Starting Thu 11/3/2022, Normal      Levonorgest-Eth Estrad 91-Day 0.15-0.03 &0.01 MG TABS Take 1 tablet by mouth daily, Starting Thu 3/16/2023, No Print      prazosin (MINIPRESS) 1 mg capsule TAKE 1 CAPSULE BY MOUTH AT NIGHT FOR 30 DAYS, Historical Med      sertraline (ZOLOFT) 100 mg tablet 150 mg daily, No Print      testosterone cypionate (DEPO-TESTOSTERONE) 200 mg/mL SOLN INJECT 1 CC INTRAMUSCULARLY EVERY 14 DAYS, Historical Med             No discharge procedures on file.    PDMP Review       None            ED Provider  Electronically Signed by             Antione Hull MD  09/09/24 4837

## 2024-09-08 LAB
ATRIAL RATE: 78 BPM
P AXIS: 71 DEGREES
PR INTERVAL: 128 MS
QRS AXIS: 74 DEGREES
QRSD INTERVAL: 80 MS
QT INTERVAL: 360 MS
QTC INTERVAL: 410 MS
T WAVE AXIS: -1 DEGREES
VENTRICULAR RATE: 78 BPM

## 2024-09-08 PROCEDURE — 93010 ELECTROCARDIOGRAM REPORT: CPT | Performed by: INTERNAL MEDICINE
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
## 2024-09-13 ENCOUNTER — VBI (OUTPATIENT)
Dept: ADMINISTRATIVE | Facility: OTHER | Age: 24
End: 2024-09-13

## 2024-09-13 NOTE — TELEPHONE ENCOUNTER
09/13/24 3:00 PM     Chart reviewed for Pap Smear (HPV) aka Cervical Cancer Screening was/were not submitted to the patient's insurance.     Zarina Riggs MA   [facility]

## 2025-02-17 ENCOUNTER — HOSPITAL ENCOUNTER (EMERGENCY)
Facility: HOSPITAL | Age: 25
Discharge: HOME | End: 2025-02-17
Attending: EMERGENCY MEDICINE
Payer: COMMERCIAL

## 2025-02-17 VITALS
HEART RATE: 69 BPM | OXYGEN SATURATION: 95 % | DIASTOLIC BLOOD PRESSURE: 64 MMHG | SYSTOLIC BLOOD PRESSURE: 119 MMHG | RESPIRATION RATE: 18 BRPM | TEMPERATURE: 97.5 F

## 2025-02-17 DIAGNOSIS — M79.10 MYALGIA: ICD-10-CM

## 2025-02-17 DIAGNOSIS — B34.9 VIRAL SYNDROME: ICD-10-CM

## 2025-02-17 DIAGNOSIS — J02.9 SORE THROAT: ICD-10-CM

## 2025-02-17 DIAGNOSIS — R05.9 COUGH: ICD-10-CM

## 2025-02-17 DIAGNOSIS — R51.9 HEADACHE: Primary | ICD-10-CM

## 2025-02-17 DIAGNOSIS — R11.0 NAUSEA: ICD-10-CM

## 2025-02-17 LAB
ALBUMIN SERPL BCG-MCNC: 4.1 G/DL (ref 3.5–5)
ALP SERPL-CCNC: 47 U/L (ref 34–104)
ALT SERPL W P-5'-P-CCNC: 12 U/L (ref 7–52)
ANION GAP SERPL CALCULATED.3IONS-SCNC: 3 MMOL/L (ref 4–13)
AST SERPL W P-5'-P-CCNC: 15 U/L (ref 5–45)
BASOPHILS # BLD AUTO: 0.02 THOUSANDS/ΜL (ref 0–0.1)
BASOPHILS NFR BLD AUTO: 0 % (ref 0–1)
BILIRUB SERPL-MCNC: 0.49 MG/DL (ref 0.2–1)
BUN SERPL-MCNC: 10 MG/DL (ref 5–25)
CALCIUM SERPL-MCNC: 8.6 MG/DL (ref 8.4–10.2)
CHLORIDE SERPL-SCNC: 104 MMOL/L (ref 96–108)
CO2 SERPL-SCNC: 30 MMOL/L (ref 21–32)
CREAT SERPL-MCNC: 0.84 MG/DL (ref 0.6–1.3)
EOSINOPHIL # BLD AUTO: 0.24 THOUSAND/ΜL (ref 0–0.61)
EOSINOPHIL NFR BLD AUTO: 5 % (ref 0–6)
ERYTHROCYTE [DISTWIDTH] IN BLOOD BY AUTOMATED COUNT: 13 % (ref 11.6–15.1)
FLUAV AG UPPER RESP QL IA.RAPID: NEGATIVE
FLUBV AG UPPER RESP QL IA.RAPID: NEGATIVE
GLUCOSE SERPL-MCNC: 88 MG/DL (ref 65–140)
HCT VFR BLD AUTO: 41.6 % (ref 36.5–46.1)
HGB BLD-MCNC: 13.2 G/DL (ref 12–15.4)
IMM GRANULOCYTES # BLD AUTO: 0.02 THOUSAND/UL (ref 0–0.2)
IMM GRANULOCYTES NFR BLD AUTO: 0 % (ref 0–2)
LYMPHOCYTES # BLD AUTO: 1.03 THOUSANDS/ΜL (ref 0.6–4.47)
LYMPHOCYTES NFR BLD AUTO: 20 % (ref 14–44)
MCH RBC QN AUTO: 27.2 PG (ref 26.8–34.3)
MCHC RBC AUTO-ENTMCNC: 31.7 G/DL (ref 31.4–37.4)
MCV RBC AUTO: 86 FL (ref 82–98)
MONOCYTES # BLD AUTO: 0.63 THOUSAND/ΜL (ref 0.17–1.22)
MONOCYTES NFR BLD AUTO: 13 % (ref 4–12)
NEUTROPHILS # BLD AUTO: 3.1 THOUSANDS/ΜL (ref 1.85–7.62)
NEUTS SEG NFR BLD AUTO: 62 % (ref 43–75)
NRBC BLD AUTO-RTO: 0 /100 WBCS
PLATELET # BLD AUTO: 192 THOUSANDS/UL (ref 149–390)
PMV BLD AUTO: 11.5 FL (ref 8.9–12.7)
POTASSIUM SERPL-SCNC: 4 MMOL/L (ref 3.5–5.3)
PROT SERPL-MCNC: 7.4 G/DL (ref 6.4–8.4)
RBC # BLD AUTO: 4.85 MILLION/UL (ref 3.88–5.12)
SARS-COV+SARS-COV-2 AG RESP QL IA.RAPID: NEGATIVE
SODIUM SERPL-SCNC: 137 MMOL/L (ref 135–147)
WBC # BLD AUTO: 5.04 THOUSAND/UL (ref 4.31–10.16)

## 2025-02-17 PROCEDURE — 85025 COMPLETE CBC W/AUTO DIFF WBC: CPT

## 2025-02-17 PROCEDURE — 36415 COLL VENOUS BLD VENIPUNCTURE: CPT

## 2025-02-17 PROCEDURE — 99284 EMERGENCY DEPT VISIT MOD MDM: CPT

## 2025-02-17 PROCEDURE — 96360 HYDRATION IV INFUSION INIT: CPT

## 2025-02-17 PROCEDURE — 80053 COMPREHEN METABOLIC PANEL: CPT

## 2025-02-17 PROCEDURE — 96361 HYDRATE IV INFUSION ADD-ON: CPT

## 2025-02-17 PROCEDURE — 87811 SARS-COV-2 COVID19 W/OPTIC: CPT

## 2025-02-17 PROCEDURE — 87804 INFLUENZA ASSAY W/OPTIC: CPT

## 2025-02-17 RX ORDER — ACETAMINOPHEN 500 MG
1000 TABLET ORAL EVERY 8 HOURS
Qty: 40 TABLET | Refills: 0 | Status: SHIPPED | OUTPATIENT
Start: 2025-02-17

## 2025-02-17 RX ORDER — IBUPROFEN 600 MG/1
600 TABLET, FILM COATED ORAL ONCE
Status: COMPLETED | OUTPATIENT
Start: 2025-02-17 | End: 2025-02-17

## 2025-02-17 RX ORDER — ACETAMINOPHEN 325 MG/1
975 TABLET ORAL ONCE
Status: COMPLETED | OUTPATIENT
Start: 2025-02-17 | End: 2025-02-17

## 2025-02-17 RX ORDER — ONDANSETRON 4 MG/1
4 TABLET, FILM COATED ORAL EVERY 6 HOURS
Qty: 15 TABLET | Refills: 0 | Status: SHIPPED | OUTPATIENT
Start: 2025-02-17

## 2025-02-17 RX ORDER — ONDANSETRON 4 MG/1
4 TABLET, ORALLY DISINTEGRATING ORAL ONCE
Status: COMPLETED | OUTPATIENT
Start: 2025-02-17 | End: 2025-02-17

## 2025-02-17 RX ORDER — IBUPROFEN 600 MG/1
600 TABLET, FILM COATED ORAL EVERY 6 HOURS PRN
Qty: 30 TABLET | Refills: 0 | Status: SHIPPED | OUTPATIENT
Start: 2025-02-17

## 2025-02-17 RX ADMIN — SODIUM CHLORIDE 1000 ML: 0.9 INJECTION, SOLUTION INTRAVENOUS at 13:05

## 2025-02-17 RX ADMIN — ACETAMINOPHEN 975 MG: 325 TABLET, FILM COATED ORAL at 12:49

## 2025-02-17 RX ADMIN — ONDANSETRON 4 MG: 4 TABLET, ORALLY DISINTEGRATING ORAL at 12:49

## 2025-02-17 RX ADMIN — IBUPROFEN 600 MG: 600 TABLET, FILM COATED ORAL at 12:49

## 2025-02-17 NOTE — DISCHARGE INSTRUCTIONS
Today I provided prescription for tylenol, ibuprofen, zofran. The tylenol and ibuprofen is for headache, sore throat, bodyaches. The zofran is for nausea, light headedness.   Make sure to stay hydrated in the coming days.   Follow up with your pcp as needed.   Return to ED for new or worsening symptoms.

## 2025-02-17 NOTE — ED PROVIDER NOTES
Time reflects when diagnosis was documented in both MDM as applicable and the Disposition within this note       Time User Action Codes Description Comment    2/17/2025  2:10 PM Thai Santamaria Add [R51.9] Headache     2/17/2025  2:11 PM Thai Santamaria Add [J02.9] Sore throat     2/17/2025  2:11 PM Thai Santamaria Add [R05.9] Cough     2/17/2025  2:11 PM Thai Santamaria Add [M79.10] Myalgia     2/17/2025  2:11 PM Thai Santamaria Add [B34.9] Viral syndrome     2/17/2025  2:12 PM Thai Santamaria Add [R11.0] Nausea           ED Disposition       ED Disposition   Discharge    Condition   Stable    Date/Time   Mon Feb 17, 2025  2:10 PM    Comment   Ronda Morillo discharge to home/self care.                   Assessment & Plan       Medical Decision Making  24-year-old patient presents to ED for evaluation of headache, sore throat, cough, myalgia, nausea, nasal congestion as seen in HPI.  On physical examination patient vital signs stable.  Normotensive.  Afebrile.  Nontachycardic.  Nonhypoxic.  No neurodeficits.  Pupils equal and reactive.  No facial droop.  No slurred speech.  Moving all 4 extremities.  Ambulating in the ED.  No murmur.  Normal breath sounds.  Abdomen soft, nontender.  Suspect viral URI.  Did obtain basic labs such as CBC, CMP as well as flu and COVID swab.  CMP without concerning values.  Appears similar to previous.  CBC without concerning values.  Negative flu/COVID swab.  Provided patient with supportive care consisting of Zofran, ibuprofen, Tylenol, IV fluids.  Patient states that they feel much better after supportive care.  Plan to discharge with prescription for Tylenol, ibuprofen, Zofran.  Work note provided.  Advised to follow-up with PCP as needed.  Return precautions discussed.  Patient agreeable to plan.  Patient discharged.     Prior to discharge, discharge instructions were discussed with patient at bedside. Patient was provided both verbal and written instructions. Patient is understanding of  "the discharge instructions and is agreeable to plan of care. Return precautions were discussed with patient bedside, patient verbalized understanding of signs and symptoms that would necessitate return to the ED. All questions were answered. Patient was comfortable with the plan of care and discharged to home.    Portions of this chart may have been written with voice recognition software.  Occasional grammatical errors, wrong word or \"sound a like\" substitutions may have occurred due to software limitations.  Please read carefully and use context to recognize where substitutions have occurred.     Amount and/or Complexity of Data Reviewed  Labs: ordered.    Risk  OTC drugs.  Prescription drug management.             Medications   ondansetron (ZOFRAN-ODT) dispersible tablet 4 mg (4 mg Oral Given 2/17/25 1249)   acetaminophen (TYLENOL) tablet 975 mg (975 mg Oral Given 2/17/25 1249)   ibuprofen (MOTRIN) tablet 600 mg (600 mg Oral Given 2/17/25 1249)   sodium chloride 0.9 % bolus 1,000 mL (0 mL Intravenous Stopped 2/17/25 1452)       ED Risk Strat Scores                            SBIRT 20yo+      Flowsheet Row Most Recent Value   Initial Alcohol Screen: US AUDIT-C     1. How often do you have a drink containing alcohol? 0 Filed at: 02/17/2025 1153   3a. Male UNDER 65: How often do you have five or more drinks on one occasion? 3 Filed at: 02/17/2025 1158   3b. FEMALE Any Age, or MALE 65+: How often do you have 4 or more drinks on one occassion? 3 Filed at: 02/17/2025 1155   Audit-C Score 6 Filed at: 02/17/2025 1154   BRENDA: How many times in the past year have you...    Used an illegal drug or used a prescription medication for non-medical reasons? Never Filed at: 02/17/2025 1157                            History of Present Illness       Chief Complaint   Patient presents with    Dizziness     BIBA Pt stated he started to feel foggy and light headed yesterday, became more severe today when pt got to work. EMS stated " new ADHD medication. Pt said no new medication since last year. Pt said been congested.       Past Medical History:   Diagnosis Date    Anxiety     Depression     History of psychiatric hospitalization     Panic attack     Psychiatric disorder     Suicidal ideations     Suicide attempt (HCC) 2016      History reviewed. No pertinent surgical history.   History reviewed. No pertinent family history.   Social History     Tobacco Use    Smoking status: Never    Smokeless tobacco: Never   Vaping Use    Vaping status: Never Used   Substance Use Topics    Alcohol use: Yes     Alcohol/week: 2.0 standard drinks of alcohol     Types: 2 Cans of beer per week    Drug use: Yes     Types: Marijuana      E-Cigarette/Vaping    E-Cigarette Use Never User       E-Cigarette/Vaping Substances    Nicotine No     THC No     CBD No     Flavoring No     Other No     Unknown No       I have reviewed and agree with the history as documented.     24-year-old patient presents to ED for evaluation of headache, nasal congestion, sore throat, cough, myalgia, nausea.  Patient states that symptoms started yesterday.  This morning they woke up and the symptoms had increased.  No known sick contacts.  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  No supportive medications prior to arrival.  Patient states that they were unable to breathe well due to the nasal congestion.        Review of Systems   Constitutional:  Positive for fatigue.   HENT:  Positive for congestion and sore throat.    Respiratory:  Positive for cough.    Neurological:  Positive for light-headedness and headaches.   All other systems reviewed and are negative.          Objective       ED Triage Vitals [02/17/25 1155]   Temperature Pulse Blood Pressure Respirations SpO2 Patient Position - Orthostatic VS   97.5 °F (36.4 °C) 69 119/64 18 95 % --      Temp src Heart Rate Source BP Location FiO2 (%) Pain Score    -- -- -- -- --      Vitals      Date and Time Temp  Pulse SpO2 Resp BP Pain Score FACES Pain Rating User   02/17/25 1155 97.5 °F (36.4 °C) 69 95 % 18 119/64 -- -- DO            Physical Exam  Vitals and nursing note reviewed.   Constitutional:       General: He is not in acute distress.     Appearance: Normal appearance. He is well-developed. He is not toxic-appearing or diaphoretic.   HENT:      Head: Normocephalic and atraumatic.      Jaw: No trismus.      Nose: Congestion present.      Mouth/Throat:      Pharynx: Uvula midline. Posterior oropharyngeal erythema present. No oropharyngeal exudate or uvula swelling.      Tonsils: No tonsillar exudate or tonsillar abscesses. 0 on the right. 0 on the left.   Eyes:      General: No scleral icterus.        Right eye: No discharge.         Left eye: No discharge.      Extraocular Movements: Extraocular movements intact.      Conjunctiva/sclera: Conjunctivae normal.      Pupils: Pupils are equal, round, and reactive to light.   Cardiovascular:      Rate and Rhythm: Normal rate and regular rhythm.      Pulses: Normal pulses.      Heart sounds: Normal heart sounds. No murmur heard.     No friction rub. No gallop.   Pulmonary:      Effort: Pulmonary effort is normal. No respiratory distress.      Breath sounds: Normal breath sounds. No stridor. No wheezing, rhonchi or rales.   Abdominal:      General: There is no distension.      Palpations: Abdomen is soft.      Tenderness: There is no abdominal tenderness. There is no guarding or rebound.      Hernia: No hernia is present.   Musculoskeletal:      Cervical back: Normal range of motion and neck supple. No rigidity.   Skin:     General: Skin is warm and dry.   Neurological:      General: No focal deficit present.      Mental Status: He is alert and oriented to person, place, and time. Mental status is at baseline.      GCS: GCS eye subscore is 4. GCS verbal subscore is 5. GCS motor subscore is 6.      Cranial Nerves: Cranial nerves 2-12 are intact.      Sensory: Sensation is  intact.      Motor: Motor function is intact.      Coordination: Coordination is intact.      Gait: Gait is intact.         Results Reviewed       Procedure Component Value Units Date/Time    FLU/COVID Rapid Antigen (30 min. TAT) - Preferred screening test in ED [[id number]]  (Normal) Collected: 02/17/25 1258    Lab Status: Final result Specimen: Nares from Nose Updated: 02/17/25 1325     SARS COV Rapid Antigen Negative     Influenza A Rapid Antigen Negative     Influenza B Rapid Antigen Negative    Narrative:      This test has been performed using the [employer] 2 FLU+SARS Antigen test under the Emergency Use Authorization (EUA). This test has been validated by the  and verified by the performing laboratory. The Constance uses lateral flow immunofluorescent sandwich assay to detect SARS-COV, Influenza A and Influenza B Antigen.     The [employer] [facility] 2 SARS Antigen test does not differentiate between SARS-CoV and SARS-CoV-2.     Negative results are presumptive and may be confirmed with a molecular assay, if necessary, for patient management. Negative results do not rule out SARS-CoV-2 or influenza infection and should not be used as the sole basis for treatment or patient management decisions. A negative test result may occur if the level of antigen in a sample is below the limit of detection of this test.     Positive results are indicative of the presence of viral antigens, but do not rule out bacterial infection or co-infection with other viruses.     All test results should be used as an adjunct to clinical observations and other information available to the provider.    FOR PEDIATRIC PATIENTS - copy/paste COVID Guidelines URL to browser: [email or web address]    Comprehensive metabolic panel [[id number]]  (Abnormal) Collected: 02/17/25 1258    Lab Status: Final result Specimen: Blood from Arm, Left Updated: 02/17/25 1324     Sodium 137 mmol/L       "Potassium 4.0 mmol/L      Chloride 104 mmol/L      CO2 30 mmol/L      ANION GAP 3 mmol/L      BUN 10 mg/dL      Creatinine 0.84 mg/dL      Glucose 88 mg/dL      Calcium 8.6 mg/dL      AST 15 U/L      ALT 12 U/L      Alkaline Phosphatase 47 U/L      Total Protein 7.4 g/dL      Albumin 4.1 g/dL      Total Bilirubin 0.49 mg/dL      eGFR --    Narrative:      Notes:     1. eGFR calculation is only valid for adults 18 years and older.  2. EGFR calculation cannot be performed for patients who are transgender, non-binary, or whose legal sex, sex at birth, and gender identity differ.    CBC and differential [[id number]]  (Abnormal) Collected: 02/17/25 1258    Lab Status: Final result Specimen: Blood from Arm, Left Updated: 02/17/25 1307     WBC 5.04 Thousand/uL      RBC 4.85 Million/uL      Hemoglobin 13.2 g/dL      Hematocrit 41.6 %      MCV 86 fL      MCH 27.2 pg      MCHC 31.7 g/dL      RDW 13.0 %      MPV 11.5 fL      Platelets 192 Thousands/uL      nRBC 0 /100 WBCs      Segmented % 62 %      Immature Grans % 0 %      Lymphocytes % 20 %      Monocytes % 13 %      Eosinophils Relative 5 %      Basophils Relative 0 %      Absolute Neutrophils 3.10 Thousands/µL      Absolute Immature Grans 0.02 Thousand/uL      Absolute Lymphocytes 1.03 Thousands/µL      Absolute Monocytes 0.63 Thousand/µL      Eosinophils Absolute 0.24 Thousand/µL      Basophils Absolute 0.02 Thousands/µL             No orders to display       Procedures    ED Medication and Procedure Management   Prior to Admission Medications   Prescriptions Last Dose Informant Patient Reported? Taking?   ARIPiprazole (ABILIFY) 5 mg tablet   No No   Sig: Take 1 tablet (5 mg total) by mouth daily for 15 days At 9am   B-D 3CC LUER-RAIN SYR 05SO0-2/2 18G X 1-1/2\" 3 ML MISC   Yes No   Sig: USE TO DRAW UP THE TESTOSTERONE CYPIONATE EVERY 14 DAYS   BD PrecisionGlide Needle 23G X 1-1/2\" MISC   Yes No   Sig: USE TO ADMINISTER THE TESTOSTERONE CYPIONATE EVERY 14 DAYS " "  Levonorgest-Eth Estrad -Day 0.15-0.03 &0.01 MG TABS   No No   Sig: Take 1 tablet by mouth daily   cetirizine (ZyrTEC) 10 mg tablet   No No   Sig: Take 1 tablet (10 mg total) by mouth daily at bedtime   ferrous sulfate 325 (65 Fe) mg tablet  Self No No   Sig: Take 1 tablet (325 mg total) by mouth daily with breakfast   fluticasone (FLONASE) 50 mcg/act nasal spray   No No   Si spray into each nostril daily   prazosin (MINIPRESS) 1 mg capsule   Yes No   Sig: TAKE 1 CAPSULE BY MOUTH AT NIGHT FOR 30 DAYS   sertraline (ZOLOFT) 100 mg tablet   No No   Si mg daily   testosterone cypionate (DEPO-TESTOSTERONE) 200 mg/mL SOLN   Yes No   Sig: INJECT 1 CC INTRAMUSCULARLY EVERY 14 DAYS      Facility-Administered Medications: None     Discharge Medication List as of 2025  2:13 PM        START taking these medications    Details   acetaminophen (TYLENOL) 500 mg tablet Take 2 tablets (1,000 mg total) by mouth every 8 (eight) hours, Starting 2025, Print      ibuprofen (MOTRIN) 600 mg tablet Take 1 tablet (600 mg total) by mouth every 6 (six) hours as needed for mild pain, Starting 2025, Print      ondansetron (ZOFRAN) 4 mg tablet Take 1 tablet (4 mg total) by mouth every 6 (six) hours, Starting 2025, Print           CONTINUE these medications which have NOT CHANGED    Details   ARIPiprazole (ABILIFY) 5 mg tablet Take 1 tablet (5 mg total) by mouth daily for 15 days At 9am, Starting 2021, Until Wed 5/3/2023, Print      B-D 3CC LUER-RAIN SYR 62EV8-7/2 18G X 1-\" 3 ML MISC USE TO DRAW UP THE TESTOSTERONE CYPIONATE EVERY 14 DAYS, Historical Med      BD PrecisionGlide Needle 23G X 1-1/2\" MISC USE TO ADMINISTER THE TESTOSTERONE CYPIONATE EVERY 14 DAYS, Historical Med      cetirizine (ZyrTEC) 10 mg tablet Take 1 tablet (10 mg total) by mouth daily at bedtime, Starting u 11/3/2022, Normal      ferrous sulfate 325 (65 Fe) mg tablet Take 1 tablet (325 mg total) by mouth daily with " breakfast, Starting Fri 1/15/2021, Print      fluticasone (FLONASE) 50 mcg/act nasal spray 1 spray into each nostril daily, Starting Thu 11/3/2022, Normal      Levonorgest-Eth Estrad 91-Day 0.15-0.03 &0.01 MG TABS Take 1 tablet by mouth daily, Starting Thu 3/16/2023, No Print      prazosin (MINIPRESS) 1 mg capsule TAKE 1 CAPSULE BY MOUTH AT NIGHT FOR 30 DAYS, Historical Med      sertraline (ZOLOFT) 100 mg tablet 150 mg daily, No Print      testosterone cypionate (DEPO-TESTOSTERONE) 200 mg/mL SOLN INJECT 1 CC INTRAMUSCULARLY EVERY 14 DAYS, Historical Med           No discharge procedures on file.  ED SEPSIS DOCUMENTATION   Time reflects when diagnosis was documented in both MDM as applicable and the Disposition within this note       Time User Action Codes Description Comment    2/17/2025  2:10 PM Thai Santamaria [R51.9] Headache     2/17/2025  2:11 PM Thai Santamaria [J02.9] Sore throat     2/17/2025  2:11 PM Thai Santamaria [R05.9] Cough     2/17/2025  2:11 PM Thai Santamaria [M79.10] Myalgia     2/17/2025  2:11 PM Thai Santamaria [B34.9] Viral syndrome     2/17/2025  2:12 PM Thai Santamaria [R11.0] Nausea                  Thai Santamaria PA-C  02/17/25 1838

## 2025-02-17 NOTE — Clinical Note
Ronda Morillo was seen and treated in our emergency department on 2/17/2025.                Diagnosis:     Ronda  .    He may return on this date: 02/21/2025         If you have any questions or concerns, please don't hesitate to call.      Thai Santamaria PA-C    ______________________________           _______________          _______________  Hospital Representative                              Date                                Time